# Patient Record
Sex: FEMALE | Race: WHITE | Employment: OTHER | URBAN - METROPOLITAN AREA
[De-identification: names, ages, dates, MRNs, and addresses within clinical notes are randomized per-mention and may not be internally consistent; named-entity substitution may affect disease eponyms.]

---

## 2017-12-04 ENCOUNTER — HOSPITAL ENCOUNTER (OUTPATIENT)
Dept: LAB | Facility: CLINIC | Age: 82
Discharge: HOME | End: 2017-12-04
Attending: ORTHOPAEDIC SURGERY | Admitting: ORTHOPAEDIC SURGERY
Payer: MEDICARE

## 2017-12-04 DIAGNOSIS — Z01.812 PRE-OPERATIVE LABORATORY EXAMINATION: ICD-10-CM

## 2017-12-04 LAB
MRSA DNA SPEC QL NAA+PROBE: NEGATIVE
SPECIMEN SOURCE: NORMAL

## 2017-12-04 PROCEDURE — 87641 MR-STAPH DNA AMP PROBE: CPT | Performed by: ORTHOPAEDIC SURGERY

## 2017-12-04 PROCEDURE — 87640 STAPH A DNA AMP PROBE: CPT | Performed by: ORTHOPAEDIC SURGERY

## 2017-12-04 PROCEDURE — 40000829 ZZHCL STATISTIC STAPH AUREUS SUSCEPT SCREEN PCR: Performed by: ORTHOPAEDIC SURGERY

## 2017-12-04 PROCEDURE — 40000830 ZZHCL STATISTIC STAPH AUREUS METH RESIST SCREEN PCR: Performed by: ORTHOPAEDIC SURGERY

## 2017-12-07 NOTE — PROGRESS NOTES
"D: Patient identified through [facility] Score of \"No help @ Home\" and \"Predict SNF-Predict Home assuming Help@ Home.\" Call placed to Patient.  I: Patient states she has fallen several times and had to go to a Care Center. She has no one to stay with her if she were to go home after surgery-family works or is out of state.  A/P: Pleasant lady that has been to a Care Facility in [place] before and prefers to go there again after discharging from the Hospital.  "

## 2017-12-11 ENCOUNTER — HOSPITAL ENCOUNTER (INPATIENT)
Facility: CLINIC | Age: 82
LOS: 3 days | Discharge: NURSING, REHAB OR LONG-TERM CARE | DRG: 470 | End: 2017-12-14
Attending: ORTHOPAEDIC SURGERY | Admitting: ORTHOPAEDIC SURGERY
Payer: MEDICARE

## 2017-12-11 ENCOUNTER — APPOINTMENT (OUTPATIENT)
Dept: PHYSICAL THERAPY | Facility: CLINIC | Age: 82
DRG: 470 | End: 2017-12-11
Attending: ORTHOPAEDIC SURGERY
Payer: MEDICARE

## 2017-12-11 ENCOUNTER — ANESTHESIA (OUTPATIENT)
Dept: SURGERY | Facility: CLINIC | Age: 82
DRG: 470 | End: 2017-12-11
Payer: MEDICARE

## 2017-12-11 ENCOUNTER — APPOINTMENT (OUTPATIENT)
Dept: GENERAL RADIOLOGY | Facility: CLINIC | Age: 82
DRG: 470 | End: 2017-12-11
Attending: ORTHOPAEDIC SURGERY
Payer: MEDICARE

## 2017-12-11 ENCOUNTER — ANESTHESIA EVENT (OUTPATIENT)
Dept: SURGERY | Facility: CLINIC | Age: 82
DRG: 470 | End: 2017-12-11
Payer: MEDICARE

## 2017-12-11 DIAGNOSIS — Z96.642 STATUS POST LEFT HIP REPLACEMENT: Primary | ICD-10-CM

## 2017-12-11 PROBLEM — Z96.649 HIP JOINT REPLACEMENT STATUS: Status: ACTIVE | Noted: 2017-12-11

## 2017-12-11 LAB
ANION GAP SERPL CALCULATED.3IONS-SCNC: 6 MMOL/L (ref 3–14)
BUN SERPL-MCNC: 20 MG/DL (ref 7–30)
CALCIUM SERPL-MCNC: 8.8 MG/DL (ref 8.5–10.1)
CHLORIDE SERPL-SCNC: 106 MMOL/L (ref 94–109)
CO2 SERPL-SCNC: 27 MMOL/L (ref 20–32)
CREAT SERPL-MCNC: 0.93 MG/DL (ref 0.52–1.04)
GFR SERPL CREATININE-BSD FRML MDRD: 57 ML/MIN/1.7M2
GLUCOSE SERPL-MCNC: 92 MG/DL (ref 70–99)
HGB BLD-MCNC: 9.5 G/DL (ref 11.7–15.7)
PLATELET # BLD AUTO: 218 10E9/L (ref 150–450)
POTASSIUM SERPL-SCNC: 3.8 MMOL/L (ref 3.4–5.3)
SODIUM SERPL-SCNC: 139 MMOL/L (ref 133–144)

## 2017-12-11 PROCEDURE — C1713 ANCHOR/SCREW BN/BN,TIS/BN: HCPCS | Performed by: ORTHOPAEDIC SURGERY

## 2017-12-11 PROCEDURE — 25000128 H RX IP 250 OP 636: Performed by: ANESTHESIOLOGY

## 2017-12-11 PROCEDURE — 40000986 XR PELVIS AND HIP PORTABLE LEFT 1 VIEW

## 2017-12-11 PROCEDURE — 25000128 H RX IP 250 OP 636: Performed by: ORTHOPAEDIC SURGERY

## 2017-12-11 PROCEDURE — 86900 BLOOD TYPING SEROLOGIC ABO: CPT | Performed by: ANESTHESIOLOGY

## 2017-12-11 PROCEDURE — 86901 BLOOD TYPING SEROLOGIC RH(D): CPT | Performed by: ANESTHESIOLOGY

## 2017-12-11 PROCEDURE — 40000170 ZZH STATISTIC PRE-PROCEDURE ASSESSMENT II: Performed by: ORTHOPAEDIC SURGERY

## 2017-12-11 PROCEDURE — 71000012 ZZH RECOVERY PHASE 1 LEVEL 1 FIRST HR: Performed by: ORTHOPAEDIC SURGERY

## 2017-12-11 PROCEDURE — 86923 COMPATIBILITY TEST ELECTRIC: CPT | Performed by: ANESTHESIOLOGY

## 2017-12-11 PROCEDURE — 97161 PT EVAL LOW COMPLEX 20 MIN: CPT | Mod: GP

## 2017-12-11 PROCEDURE — 86850 RBC ANTIBODY SCREEN: CPT | Performed by: ANESTHESIOLOGY

## 2017-12-11 PROCEDURE — 36415 COLL VENOUS BLD VENIPUNCTURE: CPT | Performed by: ANESTHESIOLOGY

## 2017-12-11 PROCEDURE — 97110 THERAPEUTIC EXERCISES: CPT | Mod: GP

## 2017-12-11 PROCEDURE — 25000128 H RX IP 250 OP 636: Performed by: NURSE ANESTHETIST, CERTIFIED REGISTERED

## 2017-12-11 PROCEDURE — 12000007 ZZH R&B INTERMEDIATE

## 2017-12-11 PROCEDURE — 25000132 ZZH RX MED GY IP 250 OP 250 PS 637: Mod: GY | Performed by: ORTHOPAEDIC SURGERY

## 2017-12-11 PROCEDURE — 37000008 ZZH ANESTHESIA TECHNICAL FEE, 1ST 30 MIN: Performed by: ORTHOPAEDIC SURGERY

## 2017-12-11 PROCEDURE — C1776 JOINT DEVICE (IMPLANTABLE): HCPCS | Performed by: ORTHOPAEDIC SURGERY

## 2017-12-11 PROCEDURE — 40000193 ZZH STATISTIC PT WARD VISIT

## 2017-12-11 PROCEDURE — 25000566 ZZH SEVOFLURANE, EA 15 MIN: Performed by: ORTHOPAEDIC SURGERY

## 2017-12-11 PROCEDURE — 25000125 ZZHC RX 250: Performed by: ORTHOPAEDIC SURGERY

## 2017-12-11 PROCEDURE — 0SRB0JA REPLACEMENT OF LEFT HIP JOINT WITH SYNTHETIC SUBSTITUTE, UNCEMENTED, OPEN APPROACH: ICD-10-PCS | Performed by: ORTHOPAEDIC SURGERY

## 2017-12-11 PROCEDURE — 27210995 ZZH RX 272: Performed by: ORTHOPAEDIC SURGERY

## 2017-12-11 PROCEDURE — 27210794 ZZH OR GENERAL SUPPLY STERILE: Performed by: ORTHOPAEDIC SURGERY

## 2017-12-11 PROCEDURE — A9270 NON-COVERED ITEM OR SERVICE: HCPCS | Mod: GY | Performed by: ORTHOPAEDIC SURGERY

## 2017-12-11 PROCEDURE — 25800025 ZZH RX 258: Performed by: ORTHOPAEDIC SURGERY

## 2017-12-11 PROCEDURE — P9041 ALBUMIN (HUMAN),5%, 50ML: HCPCS | Performed by: NURSE ANESTHETIST, CERTIFIED REGISTERED

## 2017-12-11 PROCEDURE — 71000013 ZZH RECOVERY PHASE 1 LEVEL 1 EA ADDTL HR: Performed by: ORTHOPAEDIC SURGERY

## 2017-12-11 PROCEDURE — 36415 COLL VENOUS BLD VENIPUNCTURE: CPT | Performed by: ORTHOPAEDIC SURGERY

## 2017-12-11 PROCEDURE — 97530 THERAPEUTIC ACTIVITIES: CPT | Mod: GP

## 2017-12-11 PROCEDURE — 36000093 ZZH SURGERY LEVEL 4 1ST 30 MIN: Performed by: ORTHOPAEDIC SURGERY

## 2017-12-11 PROCEDURE — 80048 BASIC METABOLIC PNL TOTAL CA: CPT | Performed by: ANESTHESIOLOGY

## 2017-12-11 PROCEDURE — 36000063 ZZH SURGERY LEVEL 4 EA 15 ADDTL MIN: Performed by: ORTHOPAEDIC SURGERY

## 2017-12-11 PROCEDURE — 25000125 ZZHC RX 250: Performed by: NURSE ANESTHETIST, CERTIFIED REGISTERED

## 2017-12-11 PROCEDURE — 85018 HEMOGLOBIN: CPT | Performed by: ORTHOPAEDIC SURGERY

## 2017-12-11 PROCEDURE — 37000009 ZZH ANESTHESIA TECHNICAL FEE, EACH ADDTL 15 MIN: Performed by: ORTHOPAEDIC SURGERY

## 2017-12-11 PROCEDURE — 0QP704Z REMOVAL OF INTERNAL FIXATION DEVICE FROM LEFT UPPER FEMUR, OPEN APPROACH: ICD-10-PCS | Performed by: ORTHOPAEDIC SURGERY

## 2017-12-11 PROCEDURE — 85049 AUTOMATED PLATELET COUNT: CPT | Performed by: ORTHOPAEDIC SURGERY

## 2017-12-11 DEVICE — IMP HEAD FEMORAL BIOM MOD 36MM -6MM 11-363660: Type: IMPLANTABLE DEVICE | Site: HIP | Status: FUNCTIONAL

## 2017-12-11 DEVICE — IMPLANTABLE DEVICE: Type: IMPLANTABLE DEVICE | Site: HIP | Status: FUNCTIONAL

## 2017-12-11 DEVICE — IMP SCR BIOM 6.5X30MM LOW PROF TI 103533: Type: IMPLANTABLE DEVICE | Site: HIP | Status: FUNCTIONAL

## 2017-12-11 RX ORDER — ATORVASTATIN CALCIUM 40 MG/1
40 TABLET, FILM COATED ORAL
Status: DISCONTINUED | OUTPATIENT
Start: 2017-12-11 | End: 2017-12-14 | Stop reason: HOSPADM

## 2017-12-11 RX ORDER — GLYCOPYRROLATE 0.2 MG/ML
INJECTION, SOLUTION INTRAMUSCULAR; INTRAVENOUS PRN
Status: DISCONTINUED | OUTPATIENT
Start: 2017-12-11 | End: 2017-12-11

## 2017-12-11 RX ORDER — ALENDRONATE SODIUM 70 MG/1
70 TABLET ORAL
Status: DISCONTINUED | OUTPATIENT
Start: 2017-12-11 | End: 2017-12-11

## 2017-12-11 RX ORDER — EPHEDRINE SULFATE 50 MG/ML
INJECTION, SOLUTION INTRAMUSCULAR; INTRAVENOUS; SUBCUTANEOUS PRN
Status: DISCONTINUED | OUTPATIENT
Start: 2017-12-11 | End: 2017-12-11

## 2017-12-11 RX ORDER — OXYCODONE HYDROCHLORIDE 5 MG/1
5 TABLET ORAL
Status: DISCONTINUED | OUTPATIENT
Start: 2017-12-11 | End: 2017-12-12

## 2017-12-11 RX ORDER — AMOXICILLIN 250 MG
1-2 CAPSULE ORAL 2 TIMES DAILY
Status: DISCONTINUED | OUTPATIENT
Start: 2017-12-11 | End: 2017-12-14 | Stop reason: HOSPADM

## 2017-12-11 RX ORDER — DIPHENHYDRAMINE HCL 12.5MG/5ML
12.5 LIQUID (ML) ORAL EVERY 6 HOURS PRN
Status: DISCONTINUED | OUTPATIENT
Start: 2017-12-11 | End: 2017-12-14 | Stop reason: HOSPADM

## 2017-12-11 RX ORDER — ASPIRIN 81 MG/1
81 TABLET ORAL EVERY MORNING
Status: DISCONTINUED | OUTPATIENT
Start: 2017-12-12 | End: 2017-12-14 | Stop reason: HOSPADM

## 2017-12-11 RX ORDER — LIDOCAINE 40 MG/G
CREAM TOPICAL
Status: DISCONTINUED | OUTPATIENT
Start: 2017-12-11 | End: 2017-12-14 | Stop reason: HOSPADM

## 2017-12-11 RX ORDER — NALOXONE HYDROCHLORIDE 0.4 MG/ML
.1-.4 INJECTION, SOLUTION INTRAMUSCULAR; INTRAVENOUS; SUBCUTANEOUS
Status: ACTIVE | OUTPATIENT
Start: 2017-12-11 | End: 2017-12-12

## 2017-12-11 RX ORDER — FENTANYL CITRATE 50 UG/ML
INJECTION, SOLUTION INTRAMUSCULAR; INTRAVENOUS PRN
Status: DISCONTINUED | OUTPATIENT
Start: 2017-12-11 | End: 2017-12-11

## 2017-12-11 RX ORDER — HYDROMORPHONE HYDROCHLORIDE 1 MG/ML
.3-.5 INJECTION, SOLUTION INTRAMUSCULAR; INTRAVENOUS; SUBCUTANEOUS EVERY 5 MIN PRN
Status: DISCONTINUED | OUTPATIENT
Start: 2017-12-11 | End: 2017-12-11 | Stop reason: HOSPADM

## 2017-12-11 RX ORDER — ONDANSETRON 2 MG/ML
INJECTION INTRAMUSCULAR; INTRAVENOUS PRN
Status: DISCONTINUED | OUTPATIENT
Start: 2017-12-11 | End: 2017-12-11

## 2017-12-11 RX ORDER — FENTANYL CITRATE 50 UG/ML
25-50 INJECTION, SOLUTION INTRAMUSCULAR; INTRAVENOUS EVERY 5 MIN PRN
Status: DISCONTINUED | OUTPATIENT
Start: 2017-12-11 | End: 2017-12-11 | Stop reason: HOSPADM

## 2017-12-11 RX ORDER — FENTANYL CITRATE 50 UG/ML
25-50 INJECTION, SOLUTION INTRAMUSCULAR; INTRAVENOUS
Status: DISCONTINUED | OUTPATIENT
Start: 2017-12-11 | End: 2017-12-11 | Stop reason: HOSPADM

## 2017-12-11 RX ORDER — AMOXICILLIN 250 MG
1-2 CAPSULE ORAL DAILY PRN
Status: ON HOLD | COMMUNITY
End: 2017-12-13

## 2017-12-11 RX ORDER — ONDANSETRON 4 MG/1
4 TABLET, ORALLY DISINTEGRATING ORAL EVERY 30 MIN PRN
Status: DISCONTINUED | OUTPATIENT
Start: 2017-12-11 | End: 2017-12-11 | Stop reason: HOSPADM

## 2017-12-11 RX ORDER — BUPROPION HYDROCHLORIDE 150 MG/1
150 TABLET, EXTENDED RELEASE ORAL EVERY MORNING
Status: DISCONTINUED | OUTPATIENT
Start: 2017-12-12 | End: 2017-12-14 | Stop reason: HOSPADM

## 2017-12-11 RX ORDER — ACETAMINOPHEN 500 MG
1000 TABLET ORAL ONCE
Status: COMPLETED | OUTPATIENT
Start: 2017-12-11 | End: 2017-12-11

## 2017-12-11 RX ORDER — AMLODIPINE BESYLATE 5 MG/1
5 TABLET ORAL EVERY MORNING
Status: DISCONTINUED | OUTPATIENT
Start: 2017-12-12 | End: 2017-12-14 | Stop reason: HOSPADM

## 2017-12-11 RX ORDER — DEXTROSE MONOHYDRATE, SODIUM CHLORIDE, AND POTASSIUM CHLORIDE 50; 1.49; 4.5 G/1000ML; G/1000ML; G/1000ML
INJECTION, SOLUTION INTRAVENOUS CONTINUOUS
Status: DISCONTINUED | OUTPATIENT
Start: 2017-12-11 | End: 2017-12-14 | Stop reason: HOSPADM

## 2017-12-11 RX ORDER — CITALOPRAM HYDROBROMIDE 20 MG/1
40 TABLET ORAL EVERY MORNING
Status: DISCONTINUED | OUTPATIENT
Start: 2017-12-12 | End: 2017-12-14 | Stop reason: HOSPADM

## 2017-12-11 RX ORDER — LIDOCAINE HYDROCHLORIDE 20 MG/ML
INJECTION, SOLUTION INFILTRATION; PERINEURAL PRN
Status: DISCONTINUED | OUTPATIENT
Start: 2017-12-11 | End: 2017-12-11

## 2017-12-11 RX ORDER — ALBUMIN, HUMAN INJ 5% 5 %
SOLUTION INTRAVENOUS CONTINUOUS PRN
Status: DISCONTINUED | OUTPATIENT
Start: 2017-12-11 | End: 2017-12-11

## 2017-12-11 RX ORDER — NALOXONE HYDROCHLORIDE 0.4 MG/ML
.1-.4 INJECTION, SOLUTION INTRAMUSCULAR; INTRAVENOUS; SUBCUTANEOUS
Status: DISCONTINUED | OUTPATIENT
Start: 2017-12-11 | End: 2017-12-14 | Stop reason: HOSPADM

## 2017-12-11 RX ORDER — DIPHENHYDRAMINE HYDROCHLORIDE 50 MG/ML
12.5 INJECTION INTRAMUSCULAR; INTRAVENOUS EVERY 6 HOURS PRN
Status: DISCONTINUED | OUTPATIENT
Start: 2017-12-11 | End: 2017-12-14 | Stop reason: HOSPADM

## 2017-12-11 RX ORDER — SODIUM CHLORIDE, SODIUM LACTATE, POTASSIUM CHLORIDE, CALCIUM CHLORIDE 600; 310; 30; 20 MG/100ML; MG/100ML; MG/100ML; MG/100ML
INJECTION, SOLUTION INTRAVENOUS CONTINUOUS
Status: DISCONTINUED | OUTPATIENT
Start: 2017-12-11 | End: 2017-12-11 | Stop reason: HOSPADM

## 2017-12-11 RX ORDER — HYDROMORPHONE HYDROCHLORIDE 1 MG/ML
0.2 INJECTION, SOLUTION INTRAMUSCULAR; INTRAVENOUS; SUBCUTANEOUS
Status: DISCONTINUED | OUTPATIENT
Start: 2017-12-11 | End: 2017-12-12

## 2017-12-11 RX ORDER — NEOSTIGMINE METHYLSULFATE 1 MG/ML
VIAL (ML) INJECTION PRN
Status: DISCONTINUED | OUTPATIENT
Start: 2017-12-11 | End: 2017-12-11

## 2017-12-11 RX ORDER — DEXAMETHASONE SODIUM PHOSPHATE 4 MG/ML
INJECTION, SOLUTION INTRA-ARTICULAR; INTRALESIONAL; INTRAMUSCULAR; INTRAVENOUS; SOFT TISSUE PRN
Status: DISCONTINUED | OUTPATIENT
Start: 2017-12-11 | End: 2017-12-11

## 2017-12-11 RX ORDER — ONDANSETRON 2 MG/ML
4 INJECTION INTRAMUSCULAR; INTRAVENOUS EVERY 6 HOURS PRN
Status: DISCONTINUED | OUTPATIENT
Start: 2017-12-11 | End: 2017-12-14 | Stop reason: HOSPADM

## 2017-12-11 RX ORDER — ALBUTEROL SULFATE 0.83 MG/ML
2.5 SOLUTION RESPIRATORY (INHALATION) EVERY 4 HOURS PRN
Status: DISCONTINUED | OUTPATIENT
Start: 2017-12-11 | End: 2017-12-11 | Stop reason: HOSPADM

## 2017-12-11 RX ORDER — ETOMIDATE 2 MG/ML
INJECTION INTRAVENOUS PRN
Status: DISCONTINUED | OUTPATIENT
Start: 2017-12-11 | End: 2017-12-11

## 2017-12-11 RX ORDER — ONDANSETRON 4 MG/1
4 TABLET, ORALLY DISINTEGRATING ORAL EVERY 6 HOURS PRN
Status: DISCONTINUED | OUTPATIENT
Start: 2017-12-11 | End: 2017-12-14 | Stop reason: HOSPADM

## 2017-12-11 RX ORDER — OXYBUTYNIN CHLORIDE 5 MG/1
5 TABLET ORAL 2 TIMES DAILY
Status: DISCONTINUED | OUTPATIENT
Start: 2017-12-11 | End: 2017-12-14 | Stop reason: HOSPADM

## 2017-12-11 RX ORDER — ACETAMINOPHEN 325 MG/1
975 TABLET ORAL EVERY 8 HOURS
Status: COMPLETED | OUTPATIENT
Start: 2017-12-11 | End: 2017-12-14

## 2017-12-11 RX ORDER — ACETAMINOPHEN 325 MG/1
650 TABLET ORAL EVERY 4 HOURS PRN
Status: DISCONTINUED | OUTPATIENT
Start: 2017-12-14 | End: 2017-12-14 | Stop reason: HOSPADM

## 2017-12-11 RX ORDER — CEFAZOLIN SODIUM 2 G/100ML
2 INJECTION, SOLUTION INTRAVENOUS
Status: COMPLETED | OUTPATIENT
Start: 2017-12-11 | End: 2017-12-11

## 2017-12-11 RX ORDER — PROCHLORPERAZINE MALEATE 5 MG
5 TABLET ORAL EVERY 6 HOURS PRN
Status: DISCONTINUED | OUTPATIENT
Start: 2017-12-11 | End: 2017-12-14 | Stop reason: HOSPADM

## 2017-12-11 RX ORDER — HYDRALAZINE HYDROCHLORIDE 20 MG/ML
2.5-5 INJECTION INTRAMUSCULAR; INTRAVENOUS EVERY 10 MIN PRN
Status: DISCONTINUED | OUTPATIENT
Start: 2017-12-11 | End: 2017-12-11 | Stop reason: HOSPADM

## 2017-12-11 RX ORDER — ONDANSETRON 2 MG/ML
4 INJECTION INTRAMUSCULAR; INTRAVENOUS EVERY 30 MIN PRN
Status: DISCONTINUED | OUTPATIENT
Start: 2017-12-11 | End: 2017-12-11 | Stop reason: HOSPADM

## 2017-12-11 RX ADMIN — ROCURONIUM BROMIDE 5 MG: 10 INJECTION INTRAVENOUS at 11:56

## 2017-12-11 RX ADMIN — ROCURONIUM BROMIDE 35 MG: 10 INJECTION INTRAVENOUS at 10:06

## 2017-12-11 RX ADMIN — SENNOSIDES AND DOCUSATE SODIUM 1 TABLET: 8.6; 5 TABLET ORAL at 20:20

## 2017-12-11 RX ADMIN — OXYCODONE HYDROCHLORIDE 5 MG: 5 TABLET ORAL at 20:20

## 2017-12-11 RX ADMIN — ETOMIDATE 10 MG: 2 INJECTION, SOLUTION INTRAVENOUS at 10:05

## 2017-12-11 RX ADMIN — ALBUMIN (HUMAN): 12.5 SOLUTION INTRAVENOUS at 11:38

## 2017-12-11 RX ADMIN — FENTANYL CITRATE 50 MCG: 50 INJECTION INTRAMUSCULAR; INTRAVENOUS at 13:58

## 2017-12-11 RX ADMIN — DEXMEDETOMIDINE HYDROCHLORIDE 4 MCG: 100 INJECTION, SOLUTION INTRAVENOUS at 12:40

## 2017-12-11 RX ADMIN — ONDANSETRON 4 MG: 2 INJECTION INTRAMUSCULAR; INTRAVENOUS at 12:07

## 2017-12-11 RX ADMIN — CEFAZOLIN SODIUM 1 G: 1 SOLUTION INTRAVENOUS at 20:17

## 2017-12-11 RX ADMIN — Medication 5 MG: at 11:38

## 2017-12-11 RX ADMIN — Medication 5 MG: at 11:24

## 2017-12-11 RX ADMIN — POTASSIUM CHLORIDE, DEXTROSE MONOHYDRATE AND SODIUM CHLORIDE: 150; 5; 450 INJECTION, SOLUTION INTRAVENOUS at 14:48

## 2017-12-11 RX ADMIN — NEOSTIGMINE METHYLSULFATE 3 MG: 1 INJECTION INTRAMUSCULAR; INTRAVENOUS; SUBCUTANEOUS at 12:18

## 2017-12-11 RX ADMIN — CEFAZOLIN SODIUM 2 G: 2 INJECTION, SOLUTION INTRAVENOUS at 10:12

## 2017-12-11 RX ADMIN — POTASSIUM CHLORIDE, DEXTROSE MONOHYDRATE AND SODIUM CHLORIDE: 150; 5; 450 INJECTION, SOLUTION INTRAVENOUS at 22:58

## 2017-12-11 RX ADMIN — PHENYLEPHRINE HYDROCHLORIDE 50 MCG: 10 INJECTION INTRAVENOUS at 12:26

## 2017-12-11 RX ADMIN — Medication 0.2 MG: at 16:40

## 2017-12-11 RX ADMIN — GLYCOPYRROLATE 0.4 MG: 0.2 INJECTION, SOLUTION INTRAMUSCULAR; INTRAVENOUS at 12:18

## 2017-12-11 RX ADMIN — Medication 0.5 MG: at 13:55

## 2017-12-11 RX ADMIN — ACETAMINOPHEN 975 MG: 325 TABLET, FILM COATED ORAL at 17:46

## 2017-12-11 RX ADMIN — FENTANYL CITRATE 50 MCG: 50 INJECTION, SOLUTION INTRAMUSCULAR; INTRAVENOUS at 10:07

## 2017-12-11 RX ADMIN — Medication 5 MG: at 11:36

## 2017-12-11 RX ADMIN — DEXMEDETOMIDINE HYDROCHLORIDE 8 MCG: 100 INJECTION, SOLUTION INTRAVENOUS at 10:04

## 2017-12-11 RX ADMIN — CEFAZOLIN SODIUM 1 G: 2 INJECTION, SOLUTION INTRAVENOUS at 12:13

## 2017-12-11 RX ADMIN — FENTANYL CITRATE 50 MCG: 50 INJECTION, SOLUTION INTRAMUSCULAR; INTRAVENOUS at 10:02

## 2017-12-11 RX ADMIN — FENTANYL CITRATE 25 MCG: 50 INJECTION, SOLUTION INTRAMUSCULAR; INTRAVENOUS at 11:45

## 2017-12-11 RX ADMIN — PHENYLEPHRINE HYDROCHLORIDE 50 MCG: 10 INJECTION INTRAVENOUS at 12:08

## 2017-12-11 RX ADMIN — FENTANYL CITRATE 50 MCG: 50 INJECTION, SOLUTION INTRAMUSCULAR; INTRAVENOUS at 10:44

## 2017-12-11 RX ADMIN — TRANEXAMIC ACID 1 G: 100 INJECTION, SOLUTION INTRAVENOUS at 12:14

## 2017-12-11 RX ADMIN — ATORVASTATIN CALCIUM 40 MG: 40 TABLET, FILM COATED ORAL at 17:49

## 2017-12-11 RX ADMIN — SODIUM CHLORIDE, POTASSIUM CHLORIDE, SODIUM LACTATE AND CALCIUM CHLORIDE: 600; 310; 30; 20 INJECTION, SOLUTION INTRAVENOUS at 09:39

## 2017-12-11 RX ADMIN — HYDROMORPHONE HYDROCHLORIDE 0.25 MG: 1 INJECTION, SOLUTION INTRAMUSCULAR; INTRAVENOUS; SUBCUTANEOUS at 12:28

## 2017-12-11 RX ADMIN — ACETAMINOPHEN 1000 MG: 500 TABLET, FILM COATED ORAL at 09:39

## 2017-12-11 RX ADMIN — TRANEXAMIC ACID 1 G: 100 INJECTION, SOLUTION INTRAVENOUS at 10:12

## 2017-12-11 RX ADMIN — SODIUM CHLORIDE, POTASSIUM CHLORIDE, SODIUM LACTATE AND CALCIUM CHLORIDE: 600; 310; 30; 20 INJECTION, SOLUTION INTRAVENOUS at 12:09

## 2017-12-11 RX ADMIN — Medication 10 MG: at 10:29

## 2017-12-11 RX ADMIN — HYDROMORPHONE HYDROCHLORIDE 0.25 MG: 1 INJECTION, SOLUTION INTRAMUSCULAR; INTRAVENOUS; SUBCUTANEOUS at 12:14

## 2017-12-11 RX ADMIN — OXYBUTYNIN CHLORIDE 5 MG: 5 TABLET ORAL at 20:20

## 2017-12-11 RX ADMIN — DEXAMETHASONE SODIUM PHOSPHATE 4 MG: 4 INJECTION, SOLUTION INTRA-ARTICULAR; INTRALESIONAL; INTRAMUSCULAR; INTRAVENOUS; SOFT TISSUE at 10:59

## 2017-12-11 RX ADMIN — GLYCOPYRROLATE 0.2 MG: 0.2 INJECTION, SOLUTION INTRAMUSCULAR; INTRAVENOUS at 10:26

## 2017-12-11 RX ADMIN — LIDOCAINE HYDROCHLORIDE 60 MG: 20 INJECTION, SOLUTION INFILTRATION; PERINEURAL at 10:05

## 2017-12-11 RX ADMIN — DEXMEDETOMIDINE HYDROCHLORIDE 8 MCG: 100 INJECTION, SOLUTION INTRAVENOUS at 10:02

## 2017-12-11 RX ADMIN — FENTANYL CITRATE 25 MCG: 50 INJECTION, SOLUTION INTRAMUSCULAR; INTRAVENOUS at 11:10

## 2017-12-11 RX ADMIN — PHENYLEPHRINE HYDROCHLORIDE 100 MCG: 10 INJECTION INTRAVENOUS at 11:41

## 2017-12-11 ASSESSMENT — LIFESTYLE VARIABLES: TOBACCO_USE: 0

## 2017-12-11 ASSESSMENT — ENCOUNTER SYMPTOMS
SEIZURES: 0
DYSRHYTHMIAS: 0

## 2017-12-11 ASSESSMENT — COPD QUESTIONNAIRES: COPD: 0

## 2017-12-11 NOTE — ANESTHESIA PREPROCEDURE EVALUATION
"Procedure: Procedure(s):  REMOVE HARDWARE HIP NAILING  ARTHROPLASTY HIP  Preop diagnosis: LEFT HIP DJD     Allergies   Allergen Reactions     Propofol      \"I got stiff as a board\"     Past Medical History:   Diagnosis Date     Arthritis      Compression fracture of spine (H)      Depression      Dysphagia      Gastro-oesophageal reflux disease      Hiatal hernia      Hyperlipidemia      Hypertension      Osteoporosis      Past Surgical History:   Procedure Laterality Date     ARTHROPLASTY HIP  11/10/2011    Procedure:ARTHROPLASTY HIP; RIGHT TOTAL HIP ARTHROPLASTY (BIOMET)^; Surgeon:CHIRAG IBRAHIM; Location: OR     GI SURGERY      EGD     GYN SURGERY      hyst     OPEN REDUCTION INTERNAL FIXATION HIP NAILING Left 11/21/2016    Procedure: OPEN REDUCTION INTERNAL FIXATION HIP NAILING;  Surgeon: Maldonado Koo MD;  Location:  OR     ORTHOPEDIC SURGERY      TKA     Prior to Admission medications    Medication Sig Start Date End Date Taking? Authorizing Provider   Acetaminophen (TYLENOL PO) Take 500-1,000 mg by mouth every 8 hours as needed for mild pain or fever   Yes Reported, Patient   ASPIRIN EC PO Take 81 mg by mouth every morning   Yes Reported, Patient   senna-docusate (SENOKOT-S;PERICOLACE) 8.6-50 MG per tablet Take 1-2 tablets by mouth daily as needed for constipation   Yes Reported, Patient   VITAMIN D, CHOLECALCIFEROL, PO Take 2,000 Units by mouth every morning   Yes Reported, Patient   mupirocin (BACTROBAN) 2 % nasal ointment Apply 1 each into each nare 2 times daily 12/5/17 12/11/17 Yes Reported, Patient   BuPROPion HCl (WELLBUTRIN SR PO) Take 150 mg by mouth every morning    Yes Unknown, Entered By History   AMLODIPINE BESYLATE PO Take 5 mg by mouth every morning    Yes Unknown, Entered By History   ATORVASTATIN CALCIUM PO Take 40 mg by mouth daily (with dinner)    Yes Unknown, Entered By History   calcium-vitamin D (CALTRATE) 600-400 MG-UNIT per tablet Take 1 tablet by mouth daily " (with dinner)    Yes Unknown, Entered By History   Multiple Vitamin (MULTI-VITAMIN PO) Take 1 tablet by mouth daily (with dinner)    Yes Reported, Patient   citalopram (CELEXA) 40 MG tablet Take 40 mg by mouth every morning    Yes Reported, Patient   oxybutynin (DITROPAN) 5 MG tablet Take 5 mg by mouth 2 times daily.   Yes Reported, Patient   omeprazole (PRILOSEC) 40 MG capsule Take 40 mg by mouth every morning    Yes Reported, Patient   ALENdronate (FOSAMAX) 70 MG tablet Take 70 mg by mouth every 7 days (Sunday)   Yes Reported, Patient     No current Epic-ordered facility-administered medications on file.      No current [facility]-ordered outpatient prescriptions on file.     Wt Readings from Last 1 Encounters:   11/20/16 54 kg (119 lb)     Temp Readings from Last 1 Encounters:   11/24/16 36.7  C (98  F)     BP Readings from Last 6 Encounters:   11/24/16 121/63   05/31/12 145/80   11/13/11 114/78     Pulse Readings from Last 4 Encounters:   11/23/16 77   11/11/11 67     Resp Readings from Last 1 Encounters:   11/24/16 16     SpO2 Readings from Last 1 Encounters:   11/24/16 95%     RECENT LABS: plt 232, hgb 11.0  ECG: NSR, no st or twave abnormalities.       Anesthesia Evaluation     .             ROS/MED HX    ENT/Pulmonary:      (-) tobacco use, asthma, COPD and sleep apnea   Neurologic:      (-) seizures, CVA and migraines   Cardiovascular:     (+) Dyslipidemia, hypertension----. : . . . :. .      (-) MEYERS, arrhythmias and valvular problems/murmurs   METS/Exercise Tolerance:     Hematologic:        (-) history of blood clots, anemia and other hematologic disorder   Musculoskeletal:   (+) , , other musculoskeletal- osteoporosis      GI/Hepatic:     (+) GERD hiatal hernia, Other GI/Hepatic dysphagia       Renal/Genitourinary:      (-) renal disease and nephrolithiasis   Endo:      (-) Type I DM, Type II DM, thyroid disease and obesity   Psychiatric:     (+) psychiatric history depression      Infectious Disease:         (-) Recent Fever   Malignancy:         Other:                     Physical Exam  Normal systems: cardiovascular and pulmonary    Airway   Mallampati: II  TM distance: >3 FB  Neck ROM: full    Dental   (+) lower dentures and upper dentures    Cardiovascular   Rhythm and rate: regular and normal  (-) no murmur    Pulmonary                     Anesthesia Plan      History & Physical Review      ASA Status:  3 .    NPO Status:  > 8 hours    Plan for General and ETT with Etomidate induction. Maintenance will be Balanced.    PONV prophylaxis:  Ondansetron (or other 5HT-3) and Dexamethasone or Solumedrol  Precedex infusion      Postoperative Care  Postoperative pain management:  Multi-modal analgesia.      Consents  Anesthetic plan, risks, benefits and alternatives discussed with:  Patient..                          .

## 2017-12-11 NOTE — ANESTHESIA CARE TRANSFER NOTE
Patient: Parvin Wen    Procedure(s):  LEFT REMOVAL OF HIP HARDWARE  AND LEFT TOTAL HIP ARTHROPLASTY  - Wound Class: I-Clean   - Wound Class: I-Clean    Diagnosis: LEFT HIP DJD   Diagnosis Additional Information: No value filed.    Anesthesia Type:   General, ETT     Note:  Airway :Face Mask  Patient transferred to:PACU  Comments: Transferred to PACU, spontaneous respirations, 10L oxygen via facemask.  All monitors and alarms on and functioning, VSS.  Patient awake, comfortable.  Report to PACU RN.Handoff Report: Identifed the Patient, Identified the Reponsible Provider, Reviewed the pertinent medical history, Discussed the surgical course, Reviewed Intra-OP anesthesia mangement and issues during anesthesia, Set expectations for post-procedure period and Allowed opportunity for questions and acknowledgement of understanding      Vitals: (Last set prior to Anesthesia Care Transfer)    CRNA VITALS  12/11/2017 1212 - 12/11/2017 1251      12/11/2017             Resp Rate (set): 10                Electronically Signed By: RONALD Long CRNA  December 11, 2017  12:51 PM

## 2017-12-11 NOTE — PLAN OF CARE
Problem: Patient Care Overview  Goal: Plan of Care/Patient Progress Review  PT: Orders received, evaluation completed and treatment initiated. 87 year old female s/p removal of L hip hardware (from previous L hip fx repair) and L MONA. Patient reports mod I for mobility using a 4WW. Pt lives in a [facility] with 2 stairs (2 grabbars) to enter. Lives alone and manages her own medications. Does have cleaning lady assist 1x/week.     Discharge Planner PT   Patient plan for discharge: TCU  Current status: Trialed supine to sit with min A x 2. Limited by pain (8/10). Returned to supine with mod A x 2. Dependent to boost to HOB. Tolerates L MONA exercises with assist.   Barriers to return to prior living situation: Stairs, level of A  Recommendations for discharge: TBD POD 2  Rationale for recommendations: TBD       Entered by: Danita Feng 12/11/2017 4:59 PM

## 2017-12-11 NOTE — IP AVS SNAPSHOT
MRN:[id number]                      After Visit Summary   12/11/2017    Parvin Wen    MRN: [id number]           Thank you!     Thank you for choosing [facility] for your care. Our goal is always to provide you with excellent care. Hearing back from our patients is one way we can continue to improve our services. Please take a few minutes to complete the written survey that you may receive in the mail after you visit with us. Thank you!        Patient Information     Date Of Birth          9/26/1930        Designated Caregiver       Most Recent Value    Caregiver    Will someone help with your care after discharge? no      About your hospital stay     You were admitted on:  December 11, 2017 You last received care in the:  [facility] [address] [facility]    You were discharged on:  December 14, 2017        Reason for your hospital stay       S/p left total hip arthroplasty  Antibiotics  Therapy  Pain control                  Who to Call     For medical emergencies, please call 911.  For non-urgent questions about your medical care, please call your primary care provider or clinic, [phone]  For questions related to your surgery, please call your surgery clinic        Attending Provider     Provider Specialty    Rj Murphy MD Orthopedics       Primary Care Provider Office Phone # Fax #    Jac Roldan [phone]      After Care Instructions     Activity - Up with nursing assistance           Advance Diet as Tolerated       Follow this diet upon discharge: Orders Placed This Encounter      Room Service      Advance Diet as Tolerated: Regular Diet Adult            Bladder scan       X 2 for post void residual as needed            Fall precautions           General info for SNF       Length of Stay Estimate: Short Term Care: Estimated # of Days <30  Condition at Discharge: Improving  Level of care:skilled   Rehabilitation Potential: Good  Admission  "H&P remains valid and up-to-date: Yes  Recent Chemotherapy: N/A  Use Nursing Home Standing Orders: Yes            Mantoux instructions       Give two-step Mantoux (PPD) Per Facility Policy Yes            Weight bearing status       WBAT            Wound care (specify)       Site:   Left hip  Instructions:  Daily dressing changes/as needed. Keep incision clean, dry, intact                  Follow-up Appointments     Follow Up and recommended labs and tests       Follow up with Dr. Murphy or Soraida Chaudhary PA-C in 2 weeks.  No follow up labs or test are needed.                  Additional Services     Occupational Therapy Adult Consult       Evaluate and treat as clinically indicated.    Reason:  S/p left total hip arthroplasty            Physical Therapy Adult Consult       Evaluate and treat as clinically indicated. Gait training and strengthening    Reason:  S/p left total hip arthroplasty                  Future tests that were ordered for you     AntiEmbolism Stockings       Bilateral below knee length.On in the morning, off at night                  Pending Results     No orders found from 12/9/2017 to 12/12/2017.            Statement of Approval     Ordered          12/13/17 0840  I have reviewed and agree with all the recommendations and orders detailed in this document.  EFFECTIVE NOW     Approved and electronically signed by:  Soraida Chaudhary PA-C             Admission Information     Date & Time Provider Department Dept. Phone    12/11/2017 Rj Murphy MD [place] [facility] [address] [facility] [phone]      Your Vitals Were     Blood Pressure Pulse Temperature Respirations Height Weight    112/56 (BP Location: Left arm) 71 98.1  F (36.7  C) (Oral) 18 1.499 m (4' 11\") 54.4 kg (120 lb)    Pulse Oximetry BMI (Body Mass Index)                92% 24.24 kg/m2          MyChart Information     [facility] lets you send messages to your doctor, view your test results, renew your " "prescriptions, schedule appointments and more. To sign up, go to [email or web address] . Click on \"Log in\" on the left side of the screen, which will take you to the Welcome page. Then click on \"Sign up Now\" on the right side of the page.     You will be asked to enter the access code listed below, as well as some personal information. Please follow the directions to create your username and password.     Your access code is: [email or web address]  Expires: 3/14/2018 11:08 AM     Your access code will  in 90 days. If you need help or a new code, please call your [facility] clinic or [phone].        Care EveryWhere ID     This is your Care EveryWhere ID. This could be used by other organizations to access your [facility] medical records  [id number]        Equal Access to Services     ROCCO COX : Lorri Madison, neelima de la cruz, lizeth ren, amaya echols . [facility] [phone].    ATENCIÓN: Si habla español, tiene a you disposición servicios gratuitos de asistencia lingüística. Llliseth al [phone].    We comply with applicable federal civil rights laws and [place] laws. We do not discriminate on the basis of race, color, national origin, age, disability, sex, sexual orientation, or gender identity.               Review of your medicines      UNREVIEWED medicines. Ask your doctor about these medicines        Dose / Directions    mupirocin 2 % nasal ointment   Commonly known as:  BACTROBAN   Ask about: Should I take this medication?        Dose:  1 each   Apply 1 each into each nare 2 times daily   Refills:  0         START taking        Dose / Directions    enoxaparin 40 MG/0.4ML injection   Commonly known as:  LOVENOX        Dose:  40 mg   Inject 0.4 mLs (40 mg) Subcutaneous every 24 hours for 10 days   Refills:  0       ondansetron 4 MG ODT tab   Commonly known as:  ZOFRAN-ODT        Dose:  4 mg   Take 1 tablet (4 mg) by mouth every 6 hours as needed " for nausea or vomiting   Quantity:  120 tablet   Refills:  0       oxyCODONE IR 5 MG tablet   Commonly known as:  ROXICODONE        Dose:  5-10 mg   Take 1-2 tablets (5-10 mg) by mouth every 3 hours as needed for moderate to severe pain   Quantity:  60 tablet   Refills:  0         CONTINUE these medicines which may have CHANGED, or have new prescriptions. If we are uncertain of the size of tablets/capsules you have at home, strength may be listed as something that might have changed.        Dose / Directions    senna-docusate 8.6-50 MG per tablet   Commonly known as:  SENOKOT-S;PERICOLACE   This may have changed:    - when to take this  - reasons to take this        Dose:  1-2 tablet   Take 1-2 tablets by mouth 2 times daily   Quantity:  100 tablet   Refills:  0         CONTINUE these medicines which have NOT CHANGED        Dose / Directions    alendronate 70 MG tablet   Commonly known as:  FOSAMAX        Dose:  70 mg   Take 70 mg by mouth every 7 days (Sunday)   Refills:  0       AMLODIPINE BESYLATE PO        Dose:  5 mg   Take 5 mg by mouth every morning   Refills:  0       ASPIRIN EC PO        Dose:  81 mg   Take 81 mg by mouth every morning   Refills:  0       ATORVASTATIN CALCIUM PO        Dose:  40 mg   Take 40 mg by mouth daily (with dinner)   Refills:  0       calcium-vitamin D 600-400 MG-UNIT per tablet   Commonly known as:  CALTRATE        Dose:  1 tablet   Take 1 tablet by mouth daily (with dinner)   Refills:  0       citalopram 40 MG tablet   Commonly known as:  celeXA        Dose:  40 mg   Take 40 mg by mouth every morning   Refills:  0       MULTI-VITAMIN PO        Dose:  1 tablet   Take 1 tablet by mouth daily (with dinner)   Refills:  0       omeprazole 40 MG capsule   Commonly known as:  priLOSEC        Dose:  40 mg   Take 40 mg by mouth every morning   Refills:  0       oxybutynin 5 MG tablet   Commonly known as:  DITROPAN        Dose:  5 mg   Take 5 mg by mouth 2 times daily.   Refills:  0        TYLENOL PO        Dose:  500-1000 mg   Take 500-1,000 mg by mouth every 8 hours as needed for mild pain or fever   Refills:  0       VITAMIN D (CHOLECALCIFEROL) PO        Dose:  2000 Units   Take 2,000 Units by mouth every morning   Refills:  0       WELLBUTRIN SR PO        Dose:  150 mg   Take 150 mg by mouth every morning   Refills:  0            Where to get your medicines      Some of these will need a paper prescription and others can be bought over the counter. Ask your nurse if you have questions.     You don't need a prescription for these medications     enoxaparin 40 MG/0.4ML injection    ondansetron 4 MG ODT tab    senna-docusate 8.6-50 MG per tablet         Information about where to get these medications is not yet available     ! Ask your nurse or doctor about these medications     oxyCODONE IR 5 MG tablet                Protect others around you: Learn how to safely use, store and throw away your medicines at www.disposemymeds.org.             Medication List: This is a list of all your medications and when to take them. Check marks below indicate your daily home schedule. Keep this list as a reference.      Medications           Morning Afternoon Evening Bedtime As Needed    alendronate 70 MG tablet   Commonly known as:  FOSAMAX   Take 70 mg by mouth every 7 days (Sunday)                                AMLODIPINE BESYLATE PO   Take 5 mg by mouth every morning   Last time this was given:  5 mg on 12/14/2017  8:05 AM                                ASPIRIN EC PO   Take 81 mg by mouth every morning   Last time this was given:  81 mg on 12/14/2017  8:07 AM                                ATORVASTATIN CALCIUM PO   Take 40 mg by mouth daily (with dinner)   Last time this was given:  40 mg on 12/14/2017  4:04 PM                                calcium-vitamin D 600-400 MG-UNIT per tablet   Commonly known as:  CALTRATE   Take 1 tablet by mouth daily (with dinner)   Last time this was given:  1 tablet on  12/14/2017  4:04 PM                                citalopram 40 MG tablet   Commonly known as:  celeXA   Take 40 mg by mouth every morning   Last time this was given:  40 mg on 12/14/2017  8:06 AM                                enoxaparin 40 MG/0.4ML injection   Commonly known as:  LOVENOX   Inject 0.4 mLs (40 mg) Subcutaneous every 24 hours for 10 days   Last time this was given:  40 mg on 12/14/2017 10:25 AM                                MULTI-VITAMIN PO   Take 1 tablet by mouth daily (with dinner)                                omeprazole 40 MG capsule   Commonly known as:  priLOSEC   Take 40 mg by mouth every morning   Last time this was given:  40 mg on 12/14/2017  8:06 AM                                ondansetron 4 MG ODT tab   Commonly known as:  ZOFRAN-ODT   Take 1 tablet (4 mg) by mouth every 6 hours as needed for nausea or vomiting   Last time this was given:  4 mg on 12/13/2017  5:10 AM                                oxybutynin 5 MG tablet   Commonly known as:  DITROPAN   Take 5 mg by mouth 2 times daily.   Last time this was given:  5 mg on 12/14/2017  8:06 AM                                oxyCODONE IR 5 MG tablet   Commonly known as:  ROXICODONE   Take 1-2 tablets (5-10 mg) by mouth every 3 hours as needed for moderate to severe pain   Last time this was given:  5 mg on 12/14/2017  2:54 PM                                senna-docusate 8.6-50 MG per tablet   Commonly known as:  SENOKOT-S;PERICOLACE   Take 1-2 tablets by mouth 2 times daily   Last time this was given:  2 tablets on 12/14/2017  8:06 AM                                TYLENOL PO   Take 500-1,000 mg by mouth every 8 hours as needed for mild pain or fever   Last time this was given:  650 mg on 12/14/2017  4:04 PM                                VITAMIN D (CHOLECALCIFEROL) PO   Take 2,000 Units by mouth every morning   Last time this was given:  2,000 Units on 12/14/2017  8:06 AM                                WELLBUTRIN SR PO   Take 150 mg  by mouth every morning   Last time this was given:  150 mg on 12/14/2017  8:06 AM                                  ASK your doctor about these medications           Morning Afternoon Evening Bedtime As Needed    mupirocin 2 % nasal ointment   Commonly known as:  BACTROBAN   Apply 1 each into each nare 2 times daily   Ask about: Should I take this medication?

## 2017-12-11 NOTE — OR NURSING
Telephone report given to [facility] RN.  Patient will be transported to Room. 19 via cart accompanied by RN and NA.  Belongings: 1 hospital bag and dentures.

## 2017-12-11 NOTE — IP AVS SNAPSHOT
[place] [address] [facility]: [phone]            Medication Administration Report for Parvin Wen as of 12/14/17 1618   Legend:    Given Hold Not Given Due Canceled Entry Other Actions    Time Time (Time) Time  Time-Action       Inactive    Active    Linked        Medications 12/08/17 12/09/17 12/10/17 12/11/17 12/12/17 12/13/17 12/14/17    acetaminophen (TYLENOL) tablet 650 mg  Dose: 650 mg Freq: EVERY 4 HOURS PRN Route: PO  PRN Reason: other  PRN Comment: surgical pain  Start: 12/14/17 0000   Admin Instructions: May give first dose 4 hours after last scheduled dose of acetaminophen.  Maximum acetaminophen dose from all sources = 75 mg/kg/day not to exceed 4 grams/day.           1604 (650 mg)-Given           amLODIPine (NORVASC) tablet 5 mg  Dose: 5 mg Freq: EVERY MORNING Route: PO  Start: 12/12/17 0900        0905 (5 mg)-Given        0801 (5 mg)-Given        0805 (5 mg)-Given           aspirin EC EC tablet 81 mg  Dose: 81 mg Freq: EVERY MORNING Route: PO  Start: 12/12/17 0900        0903 (81 mg)-Given        0802 (81 mg)-Given        0807 (81 mg)-Given           atorvastatin (LIPITOR) tablet 40 mg  Dose: 40 mg Freq: DAILY WITH SUPPER Route: PO  Start: 12/11/17 1700       1749 (40 mg)-Given        1855 (40 mg)-Given        1809 (40 mg)-Given        1604 (40 mg)-Given           benzocaine-menthol (CHLORASEPTIC) 6-10 MG lozenge 1-2 lozenge  Dose: 1-2 lozenge Freq: EVERY 1 HOUR PRN Route: BU  PRN Reason: sore throat  PRN Comment: sore throat without fever  Start: 12/11/17 1428              buPROPion (WELLBUTRIN SR) 12 hr tablet 150 mg  Dose: 150 mg Freq: EVERY MORNING Route: PO  Start: 12/12/17 0900        0905 (150 mg)-Given        0802 (150 mg)-Given        0806 (150 mg)-Given           calcium carbonate (TUMS) chewable tablet 500-1,000 mg  Dose: 500-1,000 mg Freq: EVERY 2 HOURS PRN Route: PO  PRN Reason: heartburn  Start: 12/12/17 0005   Admin Instructions: Do not give if  calcium level greater than 10 mg/dL.         0031 (1,000 mg)-Given       1440 (1,000 mg)-Given       1855 (1,000 mg)-Given             calcium-vitamin D (CALTRATE) 600-400 MG-UNIT per tablet 1 tablet  Dose: 1 tablet Freq: DAILY WITH SUPPER Route: PO  Start: 12/11/17 1700       (1750)-Not Given        1855 (1 tablet)-Given        1810 (1 tablet)-Given        1604 (1 tablet)-Given           cholecalciferol (vitamin D3) tablet 2,000 Units  Dose: 2,000 Units Freq: EVERY MORNING Route: PO  Start: 12/12/17 0900        0904 (2,000 Units)-Given        0802 (2,000 Units)-Given        0806 (2,000 Units)-Given           citalopram (celeXA) tablet 40 mg  Dose: 40 mg Freq: EVERY MORNING Route: PO  Start: 12/12/17 0900        0904 (40 mg)-Given        0801 (40 mg)-Given        0806 (40 mg)-Given           dextrose 5% and 0.45% NaCl + KCl 20 mEq/L infusion  Rate: 125 mL/hr Freq: CONTINUOUS Route: IV  Start: 12/11/17 1430   Admin Instructions: Change to saline lock when PO well tolerated.        1448 ( )-New Bag       2258 ( )-New Bag         0201 ( )-New Bag            diphenhydrAMINE (BENADRYL) injection 50 mg  Dose: 50 mg Freq: ONCE PRN Route: IV  PRN Reason: other  PRN Comment: hives, itching, rash, flushing, swollen lips/tongue, or respiratory distress associated with IV iron hypersensitivity.  Start: 12/13/17 1005   Admin Instructions: Can be given with ranitidine. Discontinue when parenteral iron therapy complete.  For ordered doses up to 50 mg, give IV Push undiluted. Give each 25mg over a minimum of 1 minute. Extend in non-emergency               diphenhydrAMINE (BENADRYL) solution 12.5 mg  Dose: 12.5 mg Freq: EVERY 6 HOURS PRN Route: PO  PRN Reason: itching  Start: 12/11/17 1428   Admin Instructions: Caution to be used when administering multiple CNS depressing meds within a short time frame.              Or  diphenhydrAMINE (BENADRYL) injection 12.5 mg  Dose: 12.5 mg Freq: EVERY 6 HOURS PRN Route: IV  PRN Reason:  itching  PRN Comment: Only give if patient unable to take PO.  Start: 12/11/17 1428   Admin Instructions: Caution to be used when administering multiple CNS depressing meds within a short time frame.  For ordered doses up to 50 mg, give IV Push undiluted. Give each 25mg over a minimum of 1 minute. Extend in non-emergency               enoxaparin (LOVENOX) injection 40 mg  Dose: 40 mg Freq: EVERY 24 HOURS Route: SC  Start: 12/12/17 1000   Admin Instructions: Check to make sure start date/time is 12-24 hours post op unless documented complication, AND no sooner than 22 hours post op if spinal anesthesia used.   Continue until discharge to home. HOLD if platelet count falls below 50% of baseline or less than 100,000/ L and notify provider.         0905 (40 mg)-Given        1021 (40 mg)-Given        1025 (40 mg)-Given           EPINEPHrine (ADRENALIN) kit 0.3 mg  Dose: 0.3 mg Freq: EVERY 3 MIN PRN Route: IM  PRN Comment: hypotension or airway obstruction associated with IV iron hypersensitivity.  Start: 12/13/17 1005   Admin Instructions: Up to a maximum of 2 doses.  Administer in the anterior or lateral thigh.  Discontinue when IV iron therapy complete.  See kit labeling for dosing instructions.  Not for direct undiluted intravenous injection (1mg/mL = 1:1000).   Protect from light.               famotidine (PEPCID) injection 20 mg  Dose: 20 mg Freq: ONCE PRN Route: IV  PRN Comment: hives, itching, rash associated with IV iron hypersensitivity.    Start: 12/13/17 1005   Admin Instructions: Can be given with diphenhydramine. Discontinue when parenteral iron therapy complete.  For ordered doses up to 20 mg, give IV Push diluted with 5-10ml NS over a minimum of 2 minutes.               HYDROmorphone (PF) (DILAUDID) injection 0.3-0.5 mg  Dose: 0.3-0.5 mg Freq: EVERY 2 HOURS PRN Route: IV  PRN Reason: severe pain  PRN Comment: or if patient unable to take PO  Start: 12/12/17 0712   Admin Instructions: Hold while on  "PCA.  For ordered doses up to 4 mg give IV Push undiluted. Administer each 2mg over 2-5 minutes.               lidocaine (LMX4) cream  Freq: EVERY 1 HOUR PRN Route: Top  PRN Reason: pain  PRN Comment: with VAD insertion or accessing implanted port.  Start: 12/11/17 1428   Admin Instructions: Do NOT give if patient has a history of allergy to any local anesthetic or any \"parag\" product.   Apply 30 minutes prior to VAD insertion or port access.  MAX Dose:  2.5 g (  of 5 g tube)               lidocaine 1 % 1 mL  Dose: 1 mL Freq: EVERY 1 HOUR PRN Route: OTHER  PRN Comment: mild pain with VAD insertion or accessing implanted port  Start: 12/11/17 1428   Admin Instructions: Do NOT give if patient has a history of allergy to any local anesthetic or any \"parag\" product. MAX dose 1 mL subcutaneous OR intradermal in divided doses.               methylPREDNISolone sodium succinate (solu-MEDROL) injection 125 mg  Dose: 125 mg Freq: ONCE PRN Route: IV  PRN Comment: respiratory distress associated with hypersensitivity reaction to iron.  Start: 12/13/17 1005   Admin Instructions: Discontinue when parenteral iron therapy complete  Give Doses 125mg and less IV Push over 2-3 minutes - reconstitute with 2 mL of bacteriostatic water if no diluent is provided. Doses greater than 125 mg need to be in at least 50 mL IVPB.               naloxone (NARCAN) injection 0.1-0.4 mg  Dose: 0.1-0.4 mg Freq: EVERY 2 MIN PRN Route: IV  PRN Reason: opioid reversal  Start: 12/11/17 1428   Admin Instructions: For respiratory rate LESS than or EQUAL to 8.  Partial reversal dose:  0.1 mg titrated q 2 minutes for Analgesia Side Effects Monitoring Sedation Level of 3 (frequently drowsy, arousable, drifts to sleep during conversation).Full reversal dose:  0.4 mg bolus for Analgesia Side Effects Monitoring Sedation Level of 4 (somnolent, minimal or no response to stimulation).  For ordered doses up to 2mg give IVP. Give each 0.4mg over 15 seconds in " emergency situations. For non-emergent situations further dilute in 9mL of NS to facilitate titration of response.               omeprazole (priLOSEC) CR capsule 40 mg  Dose: 40 mg Freq: EVERY MORNING Route: PO  Start: 12/12/17 0900        0904 (40 mg)-Given        0801 (40 mg)-Given        0806 (40 mg)-Given           ondansetron (ZOFRAN-ODT) ODT tab 4 mg  Dose: 4 mg Freq: EVERY 6 HOURS PRN Route: PO  PRN Reasons: nausea,vomiting  Start: 12/11/17 1428   Admin Instructions: This is Step 1 of nausea and vomiting management.  If nausea not resolved in 15 minutes, go to Step 2 prochlorperazine (COMPAZINE). Do not push through foil backing. Peel back foil and gently remove. Place on tongue immediately. Administration with liquid unnecessary         2203 (4 mg)-Given        0510 (4 mg)-Given           Or  ondansetron (ZOFRAN) injection 4 mg  Dose: 4 mg Freq: EVERY 6 HOURS PRN Route: IV  PRN Reasons: nausea,vomiting  Start: 12/11/17 1428   Admin Instructions: This is Step 1 of nausea and vomiting management.  If nausea not resolved in 15 minutes, go to Step 2 prochlorperazine (COMPAZINE).  Irritant. For ordered doses up to 4 mg, give IV Push undiluted over 2-5 minutes.                             oxybutynin (DITROPAN) tablet 5 mg  Dose: 5 mg Freq: 2 TIMES DAILY Route: PO  Start: 12/11/17 2100       2020 (5 mg)-Given        0904 (5 mg)-Given       2036 (5 mg)-Given        0802 (5 mg)-Given       2049 (5 mg)-Given        0806 (5 mg)-Given       [ ] 2100           oxyCODONE IR (ROXICODONE) tablet 5-10 mg  Dose: 5-10 mg Freq: EVERY 3 HOURS PRN Route: PO  PRN Reason: moderate to severe pain  Start: 12/12/17 0712   Admin Instructions: IF CrCl is UNKNOWN start at lowest end of dosing range.  Hold while on PCA or with regular IV opioid dosing.         0747 (5 mg)-Given       1056 (10 mg)-Given       1451 (10 mg)-Given        0509 (5 mg)-Given       0802 (5 mg)-Given       1505 (5 mg)-Given       1809 (5 mg)-Given        0807 (5  mg)-Given       1454 (5 mg)-Given           prochlorperazine (COMPAZINE) injection 5 mg  Dose: 5 mg Freq: EVERY 6 HOURS PRN Route: IV  PRN Reasons: nausea,vomiting  Start: 12/11/17 1428   Admin Instructions: This is Step 2 of nausea and vomiting management.   If nausea not resolved in 15 minutes, give metoclopramide (REGLAN) if ordered (step 3 of nausea and vomiting management)  For ordered doses up to 10 mg, give IV Push undiluted. Each 5mg over 1 minute.              Or  prochlorperazine (COMPAZINE) tablet 5 mg  Dose: 5 mg Freq: EVERY 6 HOURS PRN Route: PO  PRN Reasons: nausea,vomiting  Start: 12/11/17 1428   Admin Instructions: This is Step 2 of nausea and vomiting management.   If nausea not resolved in 15 minutes, give metoclopramide (REGLAN) if ordered (step 3 of nausea and vomiting management)               senna-docusate (SENOKOT-S;PERICOLACE) 8.6-50 MG per tablet 1-2 tablet  Dose: 1-2 tablet Freq: 2 TIMES DAILY Route: PO  Start: 12/11/17 2100   Admin Instructions: Start with 1 tablet PO BID, If no bowel movement in 24 hours, increase to 2 tablets PO BID.  Hold for loose stools.        2020 (1 tablet)-Given        0905 (1 tablet)-Given       2036 (2 tablet)-Given        0801 (2 tablet)-Given       2049 (2 tablet)-Given        0806 (2 tablet)-Given       [ ] 2100           sodium chloride (PF) 0.9% PF flush 3 mL  Dose: 3 mL Freq: EVERY 8 HOURS Route: IK  Start: 12/11/17 1430   Admin Instructions: And Q1H PRN, to lock peripheral IV dormant line.        (1554)-Not Given       (2259)-Not Given               (1452)-Not Given       (2204)-Not Given        (0610)-Not Given       1812 (3 mL)-Given        0207 (3 mL)-Given       0807 (3 mL)-Given       (1436)-Not Given       [ ] 2230           sodium chloride (PF) 0.9% PF flush 3 mL  Dose: 3 mL Freq: EVERY 1 HOUR PRN Route: IK  PRN Reason: line flush  PRN Comment: for peripheral IV flush post IV meds  Start: 12/11/17 1428             Completed  Medications  Medications 12/08/17 12/09/17 12/10/17 12/11/17 12/12/17 12/13/17 12/14/17         Dose: 1,000 mL Freq: ONCE Route: IV  Last Dose: 1,000 mL (12/12/17 0750)  Start: 12/12/17 0730   End: 12/12/17 1050        0750 (1,000 mL)-New Bag               Dose: 975 mg Freq: EVERY 8 HOURS Route: PO  Start: 12/11/17 1430   End: 12/14/17 1025   Admin Instructions: Do not use if patient has an active opioid/acetaminophen analgesic order for pain  Maximum acetaminophen dose from all sources = 75 mg/kg/day not to exceed 4 grams/day.        1746 (975 mg)-Given        0256 (975 mg)-Given       0904 (975 mg)-Given       1855 (975 mg)-Given        0201 (975 mg)-Given       1021 (975 mg)-Given       1809 (975 mg)-Given        0207 (975 mg)-Given       1025 (975 mg)-Given             Dose: 1 g Freq: EVERY 8 HOURS Route: IV  Indications of Use: PERIOPERATIVE PHARMACOPROPHYLAXIS  Last Dose: 1 g (12/12/17 0449)  Start: 12/11/17 2000   End: 12/12/17 0519   Admin Instructions: First post-op dose due 8 hours after intra-op dose, see eMAR.        2017 (1 g)-New Bag        0449 (1 g)-New Bag               Dose: 200 mg Freq: EVERY 24 HOURS Route: IV  Start: 12/13/17 1100   End: 12/14/17 1126         1154 (200 mg)-Given        1026 (200 mg)-Given          Discontinued Medications  Medications 12/08/17 12/09/17 12/10/17 12/11/17 12/12/17 12/13/17 12/14/17         Dose: 0.2 mg Freq: EVERY 2 HOURS PRN Route: IV  PRN Reason: severe pain  PRN Comment: or if patient unable to take PO  Start: 12/11/17 1428   End: 12/12/17 0712   Admin Instructions: Hold while on PCA.  For ordered doses up to 4 mg give IV Push undiluted. Administer each 2mg over 2-5 minutes.        1640 (0.2 mg)-Given [C]        0712-Med Discontinued           Dose: 0.1-0.4 mg Freq: EVERY 2 MIN PRN Route: IV  PRN Reason: opioid reversal  Start: 12/11/17 1428   End: 12/12/17 1427   Admin Instructions: For apnea or imminent respiratory arrest: give 0.4 mg IV undiluted Q 2 minutes  PRN until desired degree of reversal is obtained, stop opioid and notify provider. Continue monitoring until discharge criteria are met for a minimum of 2 hours.  For severe sedation, decrease in respiratory depth, quality or respiratory rate less than 8: give 0.1 mg IV Q 2 minutes x 3 doses, stop opioid and notify provider.  Try to minimize reversal of analgesia especially in end-of-life patients  For ordered doses up to 2mg give IVP. Give each 0.4mg over 15 seconds in emergency situations. For non-emergent situations further dilute in 9mL of NS to facilitate titration of response.         1427-Med Discontinued           Dose: 5 mg Freq: EVERY 3 HOURS PRN Route: PO  PRN Reason: moderate to severe pain  Start: 12/11/17 1428   End: 12/12/17 0712   Admin Instructions: IF CrCl is UNKNOWN start at lowest end of dosing range.  Hold while on PCA or with regular IV opioid dosing.        2020 (5 mg)-Given        0031 (5 mg)-Given       0417 (5 mg)-Given       0712-Med Discontinued

## 2017-12-11 NOTE — IP AVS SNAPSHOT
"[place] [address] [facility]: [phone]                                              INTERAGENCY TRANSFER FORM - PHYSICIAN ORDERS   2017                    Hospital Admission Date: 2017  SLOANE VALDEZ   : 1930  Sex: Female        Attending Provider: Rj Murphy MD     Allergies:  Propofol    Infection:  None   Service:  SURGERY    Ht:  1.499 m (4' 11\")   Wt:  54.4 kg (120 lb)   Admission Wt:  54.4 kg (120 lb)    BMI:  24.24 kg/m 2   BSA:  1.5 m 2            Patient PCP Information     Provider PCP Type    [facility]      ED Clinical Impression     Diagnosis Description Comment Added By Time Added    Status post left hip replacement [Z96.642] Status post left hip replacement [Z96.642]  Soraida Chaudhary PA-C 2017  8:34 AM      Hospital Problems as of 2017              Priority Class Noted POA    Hip joint replacement status Medium  2017 Yes      Non-Hospital Problems as of 2017              Priority Class Noted    Femur fracture, left (H) Medium  2016    Closed right hip fracture (H) Medium  2016      Code Status History     Date Active Date Inactive Code Status Order ID Comments User Context    2017  8:40 AM  Full Code [id number]  Soraida Chaudhary PA-C Outpatient    2017  2:28 PM 2017  8:40 AM Full Code [id number]  Soraida Chaudhary PA-C Inpatient    2016  2:05 PM 2016  4:29 PM DNR/DNI [id number]  Maldonado Koo MD Inpatient    2016  7:30 PM 2016  2:05 PM DNR/DNI [id number]  Kolby Jones MD Inpatient    2011 11:07 AM 2016  7:30 PM Full Code [id number]  Rj Murphy MD Outpatient    11/10/2011 11:24 AM 2011 11:07 AM Full Code [id number]  Erin Rendon RN Inpatient         Medication Review      UNREVIEWED medications. Ask your doctor about these medications        Dose / Directions Comments    mupirocin 2 % " nasal ointment   Commonly known as:  BACTROBAN   Ask about: Should I take this medication?        Dose:  1 each   Apply 1 each into each nare 2 times daily   Refills:  0          START taking        Dose / Directions Comments    enoxaparin 40 MG/0.4ML injection   Commonly known as:  LOVENOX        Dose:  40 mg   Inject 0.4 mLs (40 mg) Subcutaneous every 24 hours for 10 days   Refills:  0        ondansetron 4 MG ODT tab   Commonly known as:  ZOFRAN-ODT        Dose:  4 mg   Take 1 tablet (4 mg) by mouth every 6 hours as needed for nausea or vomiting   Quantity:  120 tablet   Refills:  0        oxyCODONE IR 5 MG tablet   Commonly known as:  ROXICODONE        Dose:  5-10 mg   Take 1-2 tablets (5-10 mg) by mouth every 3 hours as needed for moderate to severe pain   Quantity:  60 tablet   Refills:  0          CONTINUE these medications which may have CHANGED, or have new prescriptions. If we are uncertain of the size of tablets/capsules you have at home, strength may be listed as something that might have changed.        Dose / Directions Comments    senna-docusate 8.6-50 MG per tablet   Commonly known as:  SENOKOT-S;PERICOLACE   This may have changed:    - when to take this  - reasons to take this        Dose:  1-2 tablet   Take 1-2 tablets by mouth 2 times daily   Quantity:  100 tablet   Refills:  0          CONTINUE these medications which have NOT CHANGED        Dose / Directions Comments    alendronate 70 MG tablet   Commonly known as:  FOSAMAX        Dose:  70 mg   Take 70 mg by mouth every 7 days (Sunday)   Refills:  0        AMLODIPINE BESYLATE PO        Dose:  5 mg   Take 5 mg by mouth every morning   Refills:  0        ASPIRIN EC PO        Dose:  81 mg   Take 81 mg by mouth every morning   Refills:  0        ATORVASTATIN CALCIUM PO        Dose:  40 mg   Take 40 mg by mouth daily (with dinner)   Refills:  0        calcium-vitamin D 600-400 MG-UNIT per tablet   Commonly known as:  CALTRATE        Dose:  1 tablet    Take 1 tablet by mouth daily (with dinner)   Refills:  0        citalopram 40 MG tablet   Commonly known as:  celeXA        Dose:  40 mg   Take 40 mg by mouth every morning   Refills:  0        MULTI-VITAMIN PO        Dose:  1 tablet   Take 1 tablet by mouth daily (with dinner)   Refills:  0        omeprazole 40 MG capsule   Commonly known as:  priLOSEC        Dose:  40 mg   Take 40 mg by mouth every morning   Refills:  0        oxybutynin 5 MG tablet   Commonly known as:  DITROPAN        Dose:  5 mg   Take 5 mg by mouth 2 times daily.   Refills:  0        TYLENOL PO        Dose:  500-1000 mg   Take 500-1,000 mg by mouth every 8 hours as needed for mild pain or fever   Refills:  0        VITAMIN D (CHOLECALCIFEROL) PO        Dose:  2000 Units   Take 2,000 Units by mouth every morning   Refills:  0        WELLBUTRIN SR PO        Dose:  150 mg   Take 150 mg by mouth every morning   Refills:  0                Summary of Visit     Reason for your hospital stay       S/p left total hip arthroplasty  Antibiotics  Therapy  Pain control             After Care     Activity - Up with nursing assistance           Advance Diet as Tolerated       Follow this diet upon discharge: Orders Placed This Encounter      Room Service      Advance Diet as Tolerated: Regular Diet Adult       Bladder scan       X 2 for post void residual as needed       Fall precautions           General info for SNF       Length of Stay Estimate: Short Term Care: Estimated # of Days <30  Condition at Discharge: Improving  Level of care:skilled   Rehabilitation Potential: Good  Admission H&P remains valid and up-to-date: Yes  Recent Chemotherapy: N/A  Use Nursing Home Standing Orders: Yes       Mantoux instructions       Give two-step Mantoux (PPD) Per Facility Policy Yes       Weight bearing status       WBAT       Wound care (specify)       Site:   Left hip  Instructions:  Daily dressing changes/as needed. Keep incision clean, dry, intact              Referrals     Occupational Therapy Adult Consult       Evaluate and treat as clinically indicated.    Reason:  S/p left total hip arthroplasty       Physical Therapy Adult Consult       Evaluate and treat as clinically indicated. Gait training and strengthening    Reason:  S/p left total hip arthroplasty             Supplies     AntiEmbolism Stockings       Bilateral below knee length.On in the morning, off at night             Follow-Up Appointment Instructions     Future Labs/Procedures    Follow Up and recommended labs and tests     Comments:    Follow up with Dr. Murphy or Soraida Chaudhary PA-C in 2 weeks.  No follow up labs or test are needed.      Follow-Up Appointment Instructions     Follow Up and recommended labs and tests       Follow up with Dr. Murphy or Soraida Chaudhary PA-C in 2 weeks.  No follow up labs or test are needed.             Statement of Approval     Ordered          12/13/17 0840  I have reviewed and agree with all the recommendations and orders detailed in this document.  EFFECTIVE NOW     Approved and electronically signed by:  Soraida Chaudhary PA-C

## 2017-12-11 NOTE — IP AVS SNAPSHOT
[address] [facility]    [address] [place] [address]    Phone:  [phone]                                       After Visit Summary   12/11/2017    Parvin Wen    MRN: [id number]           After Visit Summary Signature Page     I have received my discharge instructions, and my questions have been answered. I have discussed any challenges I see with this plan with the nurse or doctor.    ..........................................................................................................................................  Patient/Patient Representative Signature      ..........................................................................................................................................  Patient Representative Print Name and Relationship to Patient    ..................................................               ................................................  Date                                            Time    ..........................................................................................................................................  Reviewed by Signature/Title    ...................................................              ..............................................  Date                                                            Time

## 2017-12-11 NOTE — IP AVS SNAPSHOT
"` `     [place] [address] [facility]: [phone]                                              INTERAGENCY TRANSFER FORM - NURSING   2017                    Hospital Admission Date: 2017  SLOANE VALDEZ   : 1930  Sex: Female        Attending Provider: Rj Murphy MD     Allergies:  Propofol    Infection:  None   Service:  SURGERY    Ht:  1.499 m (4' 11\")   Wt:  54.4 kg (120 lb)   Admission Wt:  54.4 kg (120 lb)    BMI:  24.24 kg/m 2   BSA:  1.5 m 2            Patient PCP Information     Provider PCP Type    Jac Canada      Current Code Status     Date Active Code Status Order ID Comments User Context       Prior      Code Status History     Date Active Date Inactive Code Status Order ID Comments User Context    2017  8:40 AM  Full Code [id number]  Soraida Chaudhary PA-C Outpatient    2017  2:28 PM 2017  8:40 AM Full Code [id number]  Soraida Chaudhary PA-C Inpatient    2016  2:05 PM 2016  4:29 PM DNR/DNI [id number]  Maldonado Koo MD Inpatient    2016  7:30 PM 2016  2:05 PM DNR/DNI [id number]  Kolby Jones MD Inpatient    2011 11:07 AM 2016  7:30 PM Full Code [id number]  Rj Murphy MD Outpatient    11/10/2011 11:24 AM 2011 11:07 AM Full Code [id number]  Erin Rendon RN Inpatient      Advance Directives        Does patient have a scanned Advance Directive/ACP document in EPIC?           No        Hospital Problems as of 2017              Priority Class Noted POA    Hip joint replacement status Medium  2017 Yes      Non-Hospital Problems as of 2017              Priority Class Noted    Femur fracture, left (H) Medium  2016    Closed right hip fracture (H) Medium  2016      Immunizations     Name Date      Influenza (High Dose) 3 valent vaccine 16          END      ASSESSMENT     Discharge Profile Flowsheet     EXPECTED " DISCHARGE     Resources List  Transitional Care 11/22/16 1420    Expected Discharge Date  12/14/17 12/14/17 1455   PAS Number  [id number] 12/14/17 1455    DISCHARGE NEEDS ASSESSMENT     Senior Linkage Line Referral Placed  12/14/17 12/14/17 1455    Concerns To Be Addressed  discharge planning concerns 11/22/16 0945   Referrals Placed  Post Acute Facilities;Senior Linkage Line;Transportation 11/22/16 1420    Patient/family verbalizes understanding of discharge plan recommendations?  Yes 12/14/17 1455   SKIN      Medical Team notified of plan?  yes 12/14/17 1455   Inspection of bony prominences  Full 12/14/17 0943    Equipment Currently Used at Home  tub bench;walker, rolling 12/11/17 1607   Procedural focused assessment (identify areas inspected)   Occiput;Nose;Cheek, left;Cheek, right;Ear, left;Ear, right;Scapula, left;Scapula, right;Elbow, left;Elbow, right;Abdomen;Spine;Hip, left;Hip, right;Buttock, left;Buttock, right;Sacrum;Coccyx;Knee, left;Knee, right;Ankle, left;Ankle, right 12/11/17 1243    Transportation Available  family or friend will provide 12/11/17 1607   Inspection under devices  Full 12/13/17 1245    # of Referrals Placed by CTS  Senior Linkage Line;Post Acute Facilities;Transportation 12/14/17 1455   Skin WDL  ex 12/14/17 0943    GASTROINTESTINAL (ADULT,PEDIATRIC,OB)     Skin Temperature  warm 12/14/17 0112    GI WDL  WDL 12/14/17 0943   Skin Moisture  dry 12/14/17 0943    All Quadrants Bowel Sounds  audible and active in all quadrants 12/14/17 0943   Skin Integrity  incision(s) 12/14/17 0943    Last Bowel Movement  12/11/17 12/14/17 0943   Full except areas not inspected   Hip, left (under dressing) 12/14/17 0943    Passing flatus  yes 12/14/17 0943   Not Inspected under devices.  Other (L hip dressing) 12/13/17 0532    COMMUNICATION ASSESSMENT     SAFETY      Patient's communication style  spoken language (English or Bilingual) 12/11/17 0915   Safety WDL  WDL 12/14/17 0943    FINAL RESOURCES      "All Alarms  alarm(s) activated and audible 12/14/17 0943                 Assessment WDL (Within Defined Limits) Definitions           Safety WDL     Effective: 09/28/15    Row Information: <b>WDL Definition:</b> Bed in low position, wheels locked; call light in reach; upper side rails up x 2; ID band on<br> <font color=\"gray\"><i>Item=AS safety wdl>>List=AS safety wdl>>Version=F14</i></font>      Skin WDL     Effective: 09/28/15    Row Information: <b>WDL Definition:</b> Warm; dry; intact; elastic; without discoloration; pressure points without redness<br> <font color=\"gray\"><i>Item=AS skin wdl>>List=AS skin wdl>>Version=F14</i></font>      Vitals     Vital Signs Flowsheet     VITAL SIGNS     Pain Orientation  Left 12/12/17 0509    Temp  98.1  F (36.7  C) 12/14/17 1548   Pain Descriptors  Constant;Aching 12/12/17 0508    Temp src  Oral 12/14/17 1548   Pain Management Interventions  medication offered but refused 12/11/17 1412    Resp  18 12/14/17 1548   Pain Intervention(s)  Medication (See eMAR) 12/14/17 1410    Pulse  71 12/14/17 1548   Response to Interventions  Decrease in pain 12/14/17 1410    Heart Rate  65 12/14/17 1548   ANALGESIA SIDE EFFECTS MONITORING      Pulse/Heart Rate Source  Monitor 12/14/17 1548   Side Effects Monitoring: Respiratory Quality  R 12/14/17 1455    BP  112/56 12/14/17 1548   Side Effects Monitoring: Respiratory Depth  N 12/14/17 1455    BP Location  Left arm 12/14/17 1548   Side Effects Monitoring: Sedation Level  1 12/14/17 1455    Patient Position  Lying 12/11/17 1328   HEIGHT AND WEIGHT      OXYGEN THERAPY     Height  1.499 m (4' 11\") 12/11/17 0913    SpO2  92 % 12/14/17 1548   Weight  54.4 kg (120 lb) 12/11/17 0913    O2 Device  None (Room air) 12/14/17 1548   BSA (Calculated - sq m)  1.51 12/11/17 0913    Oxygen Delivery  1 LPM 12/13/17 2300   BMI (Calculated)  24.29 12/11/17 0913    RESPIRATORY MONITORING     POSITIONING      Respiratory Monitoring (EtCO2)  38 mmHg 12/12/17 0595   " Body Position  supine, head elevated 12/14/17 0943    Integrated Pulmonary Index (IPI)  8-9 12/12/17 1515   Head of Bed (HOB)  HOB at 30-45 degrees 12/14/17 0943    PAIN/COMFORT     Positioning/Transfer Devices  pillows;in use 12/14/17 0943    Patient Currently in Pain  yes 12/14/17 1455   ECG      Preferred Pain Scale  number (Numeric Rating Pain Scale) 12/14/17 0933   ECG Rhythm  Sinus rhythm 12/11/17 1412    Patient's Stated Pain Goal  1 12/14/17 0933   DAILY CARE      0-10 Pain Scale  5 12/14/17 1454   Activity Management  up to bedside commode 12/14/17 0943    Word Pain Scale  8 12/11/17 1818   Activity Assistance Provided  assistance, 1 person 12/14/17 0943    Pain Location  Hip 12/12/17 0509                 Patient Lines/Drains/Airways Status    Active LINES/DRAINS/AIRWAYS     Name: Placement date: Placement time: Site: Days: Last dressing change:    Incision/Surgical Site 12/11/17 Left Hip 12/11/17   1217    3             Patient Lines/Drains/Airways Status    Active PICC/CVC     None            Intake/Output Detail Report     Date Intake         Output       Net    Shift P.O. I.V. IV Piggyback Colloid Blood Components Total Urine Emesis/NG output Drains Blood Total       Day 12/13/17 0700 - 12/13/17 1459 480 -- -- -- -- 480 400 -- -- -- 400 80    Lisa 12/13/17 1500 - 12/13/17 2259 300 -- -- -- -- 300 -- -- -- -- -- 300    Noc 12/13/17 2300 - 12/14/17 0659 -- -- -- -- -- -- 450 -- -- -- 450 -450    Day 12/14/17 0700 - 12/14/17 1459 -- -- -- -- -- -- 900 -- -- -- 900 -900    Lisa 12/14/17 1500 - 12/14/17 2259 120 -- -- -- -- 120 -- -- -- -- -- 120      Last Void/BM       Most Recent Value    Urine Occurrence 1 at 12/14/2017 1400    Stool Occurrence       Case Management/Discharge Planning     Case Management/Discharge Planning Flowsheet     REFERRAL INFORMATION     ASSESSMENT/CONCERNS TO BE ADDRESSED      Did the Initial Social Work Assessment result in a Social Work Case?  Yes 12/13/17 9566   Concerns To Be  Addressed  discharge planning concerns 11/22/16 0945    Admission Type  inpatient 12/13/17 1047   DISCHARGE PLANNING      Arrived From  home or self-care 12/13/17 1047   Patient/family verbalizes understanding of discharge plan recommendations?  Yes 12/14/17 1455    Referral Source  physician 12/13/17 1047   Medical Team notified of plan?  yes 12/14/17 1455    # of Referrals Placed by CTS  Senior Linkage Line;Post Acute Facilities;Transportation 12/14/17 1455   Transportation Available  family or friend will provide 12/11/17 1607    Reason For Consult  discharge planning 12/13/17 1047   Skilled Nursing Facility  -- ([facility]) 11/13/11 0937    Record Reviewed  medical record 12/13/17 1047   Skilled Nursing Facility Phone Number  -- [phone]) 11/13/11 0937    CTS Assigned to Case  Darcie Negron 12/13/17 1047   FINAL RESOURCES      Primary Care MD Name  Jac Roldan MD 11/22/16 0945   Equipment Currently Used at Home  tub bench;walker, rolling 12/11/17 1607    LIVING ENVIRONMENT     Resources List  Transitional Care 11/22/16 1420    Lives With  alone 12/13/17 1047   PAS Number  [id number] 12/14/17 1455    Living Arrangements  house 12/13/17 1047   Senior Linkage Line Referral Placed  12/14/17 12/14/17 1455    Provides Primary Care For  no one 12/13/17 1047   Referrals Placed  Post Acute Facilities;Senior Linkage Line;Transportation 11/22/16 1420    Able to Return to Prior Living Arrangements  no 12/13/17 1047   ABUSE RISK SCREEN      COPING/STRESS     QUESTION TO PATIENT:  Has a member of your family or a partner(now or in the past) intimidated, hurt, manipulated, or controlled you in any way?  no 12/11/17 0918    Major Change/Loss/Stressor  surgery/procedure 12/11/17 0915   QUESTION TO PATIENT: Do you feel safe going back to the place where you are living?  yes 12/11/17 0918    Patient Personal Strengths  expressive of needs;motivated;positive attitude;strong support system 11/22/16 0945   OBSERVATION: Is  there reason to believe there has been maltreatment of a vulnerable adult (ie. Physical/Sexual/Emotional abuse, self neglect, lack of adequate food, shelter, medical care, or financial exploitation)?  no 12/11/17 0918    EXPECTED DISCHARGE     (R) MENTAL HEALTH SUICIDE RISK      Expected Discharge Date  12/14/17 12/14/17 1455   Are you depressed or being treated for depression?  No 12/11/17 0951

## 2017-12-11 NOTE — OP NOTE
DATE OF PROCEDURE:  12/11/2017      PREOPERATIVE DIAGNOSIS:  Osteoarthritis, left hip/status post healed intertrochanteric hip fracture.      POSTOPERATIVE DIAGNOSIS:  Osteoarthritis, left hip/status post healed intertrochanteric hip fracture.      PROCEDURE:  Removal of hardware, left total hip replacement.      SURGEON:  Rj Murphy Jr, MD      ASSISTANT:   MARIOLA MCCRAY PA-C      ANESTHESIA:  General.       DESCRIPTION OF PROCEDURE:  Pravin Wen was brought to the operating room, general anesthesia was induced.  Prophylactic IV antibiotics and tranexamic acid were administered.  A Wilson catheter was placed.  She was then carefully rolled to the right lateral decubitus position.  An axillary roll and pelvic positioner are utilized.  The down extremities were very carefully padded.  The left lower extremity, buttock, groin, and flank were all prepped and draped in customary fashion.      A brief timeout was held to verify procedure and laterality.      A posterior approach to the hip was chosen.  This incision was made incorporating the scar from her previous internal fixation with a sliding Wheeler hip screw.  Although this incision was a little bit anterior to be ideal placement, it was satisfactory, we do the entire length of the incision in order to remove the hardware.  Dissection was carried down to the fascia which is divided in line with the skin incision.  The Charnley retractor was placed.  Proximally, the bursa is divided.  The short external rotators were sequentially divided off bone.  The hip capsule is excised, and the hip was dislocated.      The hip was then relocated, pending removal of the hardware.      The lateralis fascia was incised longitudinally, the muscle was dissected up off the lateral intermuscular septum.  The sliding hip screw was identified.  Four bicortical screws were removed.  The set screw was also removed for a total of 5 screws.  The plate is then disimpacted and  removed and the very large lag screw was finally removed.  The bone is in good condition.      The hip was then dislocated once again.  The femoral head was excised at an appropriate level and inclination.  Anterior capsulotomy is performed.  An anterior cobra retractor was placed.  The acetabulum is reamed up to 53 mm, and a Biomet RingLoc Plus size 54 acetabular shell was impacted into place.  Three 6.5 x 30 mm screws are placed up into the ilium.  A trial liner is placed.  Attention was then turned back to the femur.  The anatomy is significantly distorted by the previous fracture and hardware placement.  In order to get proper version of the femoral component, we elect to use a modular system.  This will also allow us to have our stem go distal to the most distal screw hole.  The superolateral neck is removed with a box chisel.  Serial reaming is performed by hand up to size for Rubén 50 mm x 190 stem.  Based off the reamer, we ream for the proximal body portion.  A real stem was then impacted into place.  The biggest problem we have in this case is getting the trial stem out.  Apparently the threads were in poor condition, and after multiple efforts with Rubén instruments, we ended up using the universal intramedullary nail removal system, which are removed with ease.  The real stem was then impacted.  A size A x 50 mm body is then placed over the stem, the locking screw is placed.  Based on trial reductions, we choose the -6 femoral head.  This gives us apparently equal leg lengths.  At 90 degrees of flexion, we rotated 70 degrees, she is stable in extension and external rotation.      The real size 24 liner and the real 36 mm head were then impacted into place.  Final reduction was performed the above measurements were repeated.      A liter of dilute Betadine solution was then placed in the wound and allowed to sit for 3 minutes.  This was then irrigated away with antibiotic solution.  Two medium Hemovacs  were brought out via separate stab incisions.  The lateralis fascia was closed with running 0 Vicryl.  The deep fascia was closed with interrupted 0 Ethibond figure-of-eight sutures.  2-0 Vicryl was used in the subcu, and the skin is reapproximated with skin clips.  A bulky sterile dressing and abduction pillow were applied.  The patient is taken to the recovery room in satisfactory condition.  Final counts are correct.         CHIRAG IBRAHIM JR, MD             D: 2017 12:25   T: 2017 15:15   MT: BROWN#[id number]      Name:     SLOANE VALDEZ   MRN:      [id number]        Account:        [id number]   :      1930           Procedure Date: 2017      Document: [id number]

## 2017-12-11 NOTE — PROGRESS NOTES
12/11/17 1600   Quick Adds   Type of Visit Initial PT Evaluation   Living Environment   Lives With alone   Living Arrangements house   Home Accessibility tub/shower is not walk in   Number of Stairs to Enter Home 2  (2 grab bars)   Number of Stairs Within Home 13  (one rail)   Transportation Available family or friend will provide   Living Environment Comment Has a cleaning lady 1x/week. Manages her own medications, uses a pillbox. Per daughter, patient has been going downstairs to do her own laundry recently.    Self-Care   Usual Activity Tolerance good   Current Activity Tolerance poor   Equipment Currently Used at Home tub bench;walker, rolling   Functional Level Prior   Ambulation 1-->assistive equipment  (4WW)   Transferring 1-->assistive equipment   Toileting 1-->assistive equipment  (Tub bench)   Bathing 0-->independent   Dressing 1-->assistive equipment  (Sock aide)   Fall history within last six months no   General Information   Onset of Illness/Injury or Date of Surgery - Date 12/11/17   Referring Physician MD Katherine   Patient/Family Goals Statement To go to rehab   Pertinent History of Current Problem (include personal factors and/or comorbidities that impact the POC) 87 year old female s/p removal of L hip hardware (from previous L hip fx repair) and L MONA.    Precautions/Limitations fall precautions;left hip precautions   Weight-Bearing Status - LLE weight-bearing as tolerated   General Info Comments Posterior or posterior-lateral hip precautions   Cognitive Status Examination   Orientation orientation to person, place and time   Level of Consciousness alert;confused   Follows Commands and Answers Questions able to follow single-step instructions   Pain Assessment   Patient Currently in Pain No  (Denies at rest, 8/10 with moving)   Integumentary/Edema   Integumentary/Edema Comments Dressing clean, dry and intact. +hemovac.    Range of Motion (ROM)   ROM Comment Decreased L LE AROM secondary to  "pain, weakness   Strength   Strength Comments Needs AAROM with L LE exercises and physical assist with mobility   Bed Mobility   Bed Mobility Comments Trialed supine to sit with min A x 2, not able to complete.    Sensory Examination   Sensory Perception Comments Denies burning, numbness and tingling   Modality Interventions   Planned Modality Interventions Cryotherapy   Planned Modality Interventions Comments PRN   General Therapy Interventions   Planned Therapy Interventions bed mobility training;gait training;ROM;strengthening;transfer training;home program guidelines;progressive activity/exercise   Clinical Impression   Criteria for Skilled Therapeutic Intervention yes, treatment indicated   PT Diagnosis Impaired gait   Influenced by the following impairments Pain, decreased L LE AROM/strength, impaired balance   Functional limitations due to impairments Decreased IND with functional mobility   Clinical Presentation Evolving/Changing   Clinical Presentation Rationale High fluctuating levels of pain   Clinical Decision Making (Complexity) Low complexity   Therapy Frequency` 2 times/day   Predicted Duration of Therapy Intervention (days/wks) 3 days   Anticipated Discharge Disposition Transitional Care Facility   Risk & Benefits of therapy have been explained Yes   Patient, Family & other staff in agreement with plan of care Yes   [facility] [employer] \"6 Clicks\"   2016, Trustees of [facility], under license to [employer].  All rights reserved.   6 Clicks Short Forms Basic Mobility Inpatient Short Form   [facility] [id number]  \"6 Clicks\" V.2 Basic Mobility Inpatient Short Form   1. Turning from your back to your side while in a flat bed without using bedrails? 2 - A Lot   2. Moving from lying on your back to sitting on the side of a flat bed without using bedrails? 2 - A Lot   3. Moving to and from a bed to a chair (including a wheelchair)? 1 - Total   4. Standing up from a chair using your arms " (e.g., wheelchair, or bedside chair)? 1 - Total   5. To walk in hospital room? 1 - Total   6. Climbing 3-5 steps with a railing? 1 - Total   Basic Mobility Raw Score (Score out of 24.Lower scores equate to lower levels of function) 8   Total Evaluation Time   Total Evaluation Time (Minutes) 10

## 2017-12-11 NOTE — PROGRESS NOTES
Admission medication history interview status for the 12/11/2017  admission is complete. See EPIC admission navigator for prior to admission medications     Medication history source reliability:Moderate    Medication history interview source(s):Patient    Medication history resources (including written lists, pill bottles, clinic record):Patient brought in a med list on day of procedure.    Primary pharmacy.[facility], [facility], [place].    Additional medication history information not noted on PTA med list :None    Time spent in this activity: 30 minutes    Prior to Admission medications    Medication Sig Last Dose Taking? Auth Provider   Acetaminophen (TYLENOL PO) Take 500-1,000 mg by mouth every 8 hours as needed for mild pain or fever 500 mg on 12/10/2017 at 2030 Yes Reported, Patient   ASPIRIN EC PO Take 81 mg by mouth every morning 12/6/2017 at am Yes Reported, Patient   senna-docusate (SENOKOT-S;PERICOLACE) 8.6-50 MG per tablet Take 1-2 tablets by mouth daily as needed for constipation 12/9/2017 at prn Yes Reported, Patient   VITAMIN D, CHOLECALCIFEROL, PO Take 2,000 Units by mouth every morning 12/10/2017 at am Yes Reported, Patient   mupirocin (BACTROBAN) 2 % nasal ointment Apply 1 each into each nare 2 times daily Not started yet. Yes Reported, Patient   BuPROPion HCl (WELLBUTRIN SR PO) Take 150 mg by mouth every morning  12/11/2017 at 0630 Yes Unknown, Entered By History   AMLODIPINE BESYLATE PO Take 5 mg by mouth every morning  12/11/2017 at 0630 Yes Unknown, Entered By History   ATORVASTATIN CALCIUM PO Take 40 mg by mouth daily (with dinner)  12/10/2017 at 1930 Yes Unknown, Entered By History   calcium-vitamin D (CALTRATE) 600-400 MG-UNIT per tablet Take 1 tablet by mouth daily (with dinner)  12/10/2017 at 1930 Yes Unknown, Entered By History   Multiple Vitamin (MULTI-VITAMIN PO) Take 1 tablet by mouth daily (with dinner)  12/10/2017 at 1930 Yes Reported, Patient   citalopram (CELEXA) 40 MG  tablet Take 40 mg by mouth every morning  12/11/2017 at 0630 Yes Reported, Patient   oxybutynin (DITROPAN) 5 MG tablet Take 5 mg by mouth 2 times daily. 12/10/2017 at 1930 Yes Reported, Patient   omeprazole (PRILOSEC) 40 MG capsule Take 40 mg by mouth every morning  12/10/2017 at am Yes Reported, Patient   ALENdronate (FOSAMAX) 70 MG tablet Take 70 mg by mouth every 7 days (Sunday) 12/10/2017 at am Yes Reported, Patient

## 2017-12-11 NOTE — ANESTHESIA POSTPROCEDURE EVALUATION
Patient: Parvin Wen    Procedure(s):  LEFT REMOVAL OF HIP HARDWARE  AND LEFT TOTAL HIP ARTHROPLASTY  - Wound Class: I-Clean   - Wound Class: I-Clean    Diagnosis:LEFT HIP DJD   Diagnosis Additional Information: No value filed.    Anesthesia Type:  General, ETT    Note:  Anesthesia Post Evaluation    Patient location during evaluation: PACU  Patient participation: Able to fully participate in evaluation  Level of consciousness: awake and alert  Pain management: adequate  Airway patency: patent  Cardiovascular status: acceptable, hemodynamically stable and blood pressure returned to baseline  Respiratory status: acceptable  Hydration status: acceptable  PONV: none     Anesthetic complications: None          Last vitals:  Vitals:    12/11/17 1410 12/11/17 1420 12/11/17 1445   BP: 119/58 109/53 111/55   Resp: 18 15 22   Temp:   36.9  C (98.4  F)   SpO2: 90% 95% 93%         Electronically Signed By: Blaire Vidales MD  December 11, 2017  3:12 PM

## 2017-12-11 NOTE — PLAN OF CARE
Problem: Patient Care Overview  Goal: Plan of Care/Patient Progress Review  Outcome: No Change  Pt arrived on the floor at 1445. VSS, A&Ox4 but lethargic. C/o mild numbness/tingling in lower extremities, but otherwise CMS intact. Dressing CDI. Wilson and hemovac patent. Denies any pain interventions at this time. Will continue to monitor.

## 2017-12-11 NOTE — IP AVS SNAPSHOT
"          [place] [address] [facility]: [phone]                                              INTERAGENCY TRANSFER FORM - LAB / IMAGING / EKG / EMG RESULTS   2017                    Hospital Admission Date: 2017  SLOANE VALDEZ   : 1930  Sex: Female        Attending Provider: Rj Murphy MD     Allergies:  Propofol    Infection:  None   Service:  SURGERY    Ht:  1.499 m (4' 11\")   Wt:  54.4 kg (120 lb)   Admission Wt:  54.4 kg (120 lb)    BMI:  24.24 kg/m 2   BSA:  1.5 m 2            Patient PCP Information     Provider PCP Type    [facility]         Lab Results - 3 Days      Creatinine [[id number]] (Abnormal)  Resulted: 17 07, Result status: Final result    Ordering provider: Rj Murphy MD  17 1800 Resulting lab: [facility]    Specimen Information    Type Source Collected On   Blood  17 0640          Components       Value Reference Range Flag Lab   Creatinine 0.90 0.52 - 1.04 mg/dL  FrStHsLb   GFR Estimate 59 >60 mL/min/1.7m2 L FrStHsLb   Comment:  Non  GFR Calc   GFR Estimate If Black 71 >60 mL/min/1.7m2  FrStHsLb   Comment:   GFR Calc            Platelet count [[id number]]  Resulted: 17 0700, Result status: Final result    Ordering provider: Rj Murphy MD  17 0000 Resulting lab: [facility]    Specimen Information    Type Source Collected On   Blood  17 0640          Components       Value Reference Range Flag Lab   Platelet Count 156 150 - 450 10e9/L  FrStHsLb            Hemoglobin [[id number]] (Abnormal)  Resulted: 17 0700, Result status: Final result    Ordering provider: Rj Murphy MD  17 0000 Resulting lab: [facility]    Specimen Information    Type Source Collected On   Blood  17 0640          Components       Value Reference Range Flag Lab   Hemoglobin 8.5 " 11.7 - 15.7 g/dL L FrStHsLb            Hemoglobin [[id number]] (Abnormal)  Resulted: 12/13/17 1758, Result status: Final result    Ordering provider: Rj Murphy MD  12/13/17 1100 Resulting lab: [facility]    Specimen Information    Type Source Collected On   Blood  12/13/17 1737          Components       Value Reference Range Flag Lab   Hemoglobin 7.1 11.7 - 15.7 g/dL L FrStHsLb            Hemoglobin [[id number]] (Abnormal)  Resulted: 12/13/17 0734, Result status: Final result    Ordering provider: Rj Murphy MD  12/13/17 0000 Resulting lab: [facility]    Specimen Information    Type Source Collected On   Blood  12/13/17 0711          Components       Value Reference Range Flag Lab   Hemoglobin 7.8 11.7 - 15.7 g/dL L FrStHsLb            Hemoglobin [[id number]] (Abnormal)  Resulted: 12/12/17 1646, Result status: Final result    Ordering provider: Soraida Chaudhary PA-C  12/12/17 1000 Resulting lab: [facility]    Specimen Information    Type Source Collected On   Blood  12/12/17 1620          Components       Value Reference Range Flag Lab   Hemoglobin 8.2 11.7 - 15.7 g/dL L FrStHsLb            Hemoglobin [[id number]] (Abnormal)  Resulted: 12/12/17 0645, Result status: Final result    Ordering provider: Rj Murphy MD  12/12/17 0001 Resulting lab: [facility]    Specimen Information    Type Source Collected On   Blood  12/12/17 0633          Components       Value Reference Range Flag Lab   Hemoglobin 7.9 11.7 - 15.7 g/dL L FrStHsLb            Platelet count [[id number]]  Resulted: 12/11/17 1601, Result status: Final result    Ordering provider: Rj Murphy MD  12/11/17 1200 Resulting lab: [facility]    Specimen Information    Type Source Collected On     12/11/17 1200          Components       Value Reference Range Flag Lab   Platelet Count 218 150 - 450 10e9/L  FrStHsLb             Hemoglobin [[id number]] (Abnormal)  Resulted: 12/11/17 1322, Result status: Final result    Ordering provider: Rj Murphy MD  12/11/17 1251 Resulting lab: [facility]    Specimen Information    Type Source Collected On   Blood  12/11/17 1200          Components       Value Reference Range Flag Lab   Hemoglobin 9.5 11.7 - 15.7 g/dL L FrStHsLb            Basic metabolic panel [[id number]] (Abnormal)  Resulted: 12/11/17 0958, Result status: Final result    Ordering provider: Blaire Vidales MD  12/11/17 0852 Resulting lab: [facility]    Specimen Information    Type Source Collected On   Blood  12/11/17 0918          Components       Value Reference Range Flag Lab   Sodium 139 133 - 144 mmol/L  FrStHsLb   Potassium 3.8 3.4 - 5.3 mmol/L  FrStHsLb   Chloride 106 94 - 109 mmol/L  FrStHsLb   Carbon Dioxide 27 20 - 32 mmol/L  FrStHsLb   Anion Gap 6 3 - 14 mmol/L  FrStHsLb   Glucose 92 70 - 99 mg/dL  FrStHsLb   Urea Nitrogen 20 7 - 30 mg/dL  FrStHsLb   Creatinine 0.93 0.52 - 1.04 mg/dL  FrStHsLb   GFR Estimate 57 >60 mL/min/1.7m2 L FrStHsLb   Comment:  Non  GFR Calc   GFR Estimate If Black 69 >60 mL/min/1.7m2  FrStHsLb   Comment:  African American GFR Calc   Calcium 8.8 8.5 - 10.1 mg/dL  FrStHsLb            Testing Performed By     Lab - Abbreviation Name Director Address Valid Date Range    14 - FrStHsLb [facility] Unknown [address] [place] [address] 05/08/15 1057 - Present            Unresulted Labs (24h ago through future)    Start       Ordered    12/14/17 0600  Platelet count  (enoxaparin (LOVENOX) (Weight  kg with CrCl greater than 30 mL/min is prechecked))  EVERY THREE DAYS,   Routine     Comments:  Repeat every 3 days while on VTE prophylaxis. If no result is listed, this lab has not been done the past 365 days. LATEST LAB RESULT: Platelet Count (10e9/L)       Date                     Value                  11/24/2016               114 (L)          ----------      12/11/17 1428    Unscheduled  Hemoglobin  CONDITIONAL X 2,   Routine     Comments:  IF morning Hemoglobin result is LESS than 8.0 on POD 1 and/or POD 2, release order and recheck Hemoglobin in the evening at 1700.  Notify Provider if second Hemoglobin result is LESS than 7.5.    12/11/17 1428         Imaging Results - 3 Days      XR Pelvis w Hip Port Left 1 View [[id number]]  Resulted: 12/11/17 1409, Result status: Final result    Ordering provider: Rj Murphy MD  12/11/17 1251 Resulted by: Milan Love MD    Performed: 12/11/17 1305 - 12/11/17 1325 Resulting lab: RADIOLOGY RESULTS    Narrative:       XR PELVIS AND HIP PORTABLE LEFT 1 VIEW 12/11/2017 1:25 PM    HISTORY: Post Op Hip Arthroplasty     COMPARISON: 11/20/2016    FINDINGS: Long stem left hip arthroplasty without radiographic  evidence of complication.      Impression:       IMPRESSION: Intact arthroplasty.    MILAN LOVE MD      Testing Performed By     Lab - Abbreviation Name Director Address Valid Date Range    104 - Rad Rslts RADIOLOGY RESULTS Unknown Unknown 02/16/05 1553 - Present                Encounter-Level Documents:     There are no encounter-level documents.      Order-Level Documents - 12/11/2017:     Scan on 12/8/2017  6:39 AM by Outside, Provider : EKGBESSY (below)

## 2017-12-11 NOTE — BRIEF OP NOTE
[facility] Brief Operative Note    Pre-operative diagnosis: LEFT HIP DJD    Post-operative diagnosis osteoarthritis/healed intertrochanteric fracture left hip      Procedure: Procedure(s):  LEFT REMOVAL OF HIP HARDWARE  AND LEFT TOTAL HIP ARTHROPLASTY  - Wound Class: I-Clean   - Wound Class: I-Clean   Surgeon(s): Surgeon(s) and Role:     * Rj Murphy MD - Primary     * Soraida Chaudhary PA-C - Assisting   Estimated blood loss: 250 mL    Specimens: * No specimens in log *   Findings: Please see the full operative report.

## 2017-12-11 NOTE — IP AVS SNAPSHOT
` ` Patient Information     Patient Name Sex Parvin Clifford ([id number]) Female 1930       Room Bed    5519 5519-02      Patient Demographics     Address Phone    [address] [address] [place] [address] [phone] (Home)  none (Work)  [phone] (Mobile)      Patient Ethnicity & Race     Ethnic Group Patient Race    American White      Emergency Contact(s)     Name Relation Home Work Mobile    JANNETTE CHASE Daughter [phone]      SHAN BAUTISTA Daughter [phone]        Documents on File        Status Date Received Description       Documents for the Patient    Privacy Notice - [place] Received 11/10/11     Insurance Card Received 11/10/11     External Medication Information Consent       Patient ID       Consent for Services - Hospital/Clinic Received () 11     Insurance Card Received 11/10/11     Consent for EHR Access  13 Copied from existing Consent for services - IP/ED collected on 2012    [facility] Specified Other       Consent for Services/Privacy Notice - Hospital/Clinic Received () 16     Insurance Card Received 16     Consent for EHR Access Received 16     HIM ARJUN Authorization  16     Care Everywhere Prospective Auth Received 17     Consent for Services/Privacy Notice - Hospital/Clinic Received 17        Documents for the Encounter    H/P - History and Physical  17 [facility], HISTORY AND PHY 2017    CMS IM for Patient Signature Received 17 1MM    EKG Cardiac - HIM Scan  17 EKG, [facility]    CE Point of Care Auth Received        Admission Information     Attending Provider Admitting Provider Admission Type Admission Date/Time    Rj Murphy MD Szalapski, Edward William, MD Elective 17  0753    Discharge Date Hospital Service Auth/Cert Status Service Area     Surgery Incomplete [facility] SERVICES    Unit Room/Bed Admission Status        55 ORTHO SPEC UNIT 5519/5519-02  Admission (Confirmed)       Admission     Complaint    LEFT HIP DJD , Hip joint replacement status      Hospital Account     Name Acct ID Class Status Primary Coverage    Parvin Wen [id number] Inpatient Open MEDICARE - MEDICARE            Guarantor Account (for Hospital Account #[id number])     Name Relation to Pt Service Area Active? Acct Type    Parvin Wen Self FCS Yes Personal/Family    Address Phone          [address] [place] [address] [phone](H)              Coverage Information (for Hospital Account #[id number])     1. MEDICARE/MEDICARE     F/O Payor/Plan Precert #    MEDICARE/MEDICARE     Subscriber Subscriber #    Parvin Wen [id number]    Address Phone    [employer]  [address]  [place] [address] [phone]          2. COMMERCIAL/B[employer] LIFE     F/O Payor/Plan Precert #    COMMERCIAL/BANK[employer] LIFE     Subscriber Subscriber #    Parvin Wen [id number]    Address Phone    [address]  [place] [address] [phone]

## 2017-12-11 NOTE — IP AVS SNAPSHOT
` `     [place] [address] [facility]: [phone]                 INTERAGENCY TRANSFER FORM - NOTES (H&P, Discharge Summary, Consults, Procedures, Therapies)   2017                    Hospital Admission Date: 2017  PARVIN VALDEZ   : 1930  Sex: Female        Patient PCP Information     Provider PCP Type    Jac [facility]         History & Physicals      H&P signed by Laura Edmondson at 2017  6:39 AM      Author:  Laura Edmondson Service:  (none) Author Type:  Physician    Filed:  2017  6:39 AM Date of Service:  2017  6:19 AM Creation Time:  2017  6:39 AM    Status:  Signed :  Laura Edmondson (Physician)     Delvis on 2017  6:39 AM by Delvis Provider : WENDIE DOHERTY AND JACQUIY 2017 1          Revision History        User Key Date/Time User Provider Type Action    > [N/A] 2017  6:39 AM Laura Edmondson Physician Sign                     Discharge Summaries      Discharge Summaries by Soraida Chaudhary PA-C at 2017  8:42 AM     Author:  Soraida Chaudhary PA-C Service:  Orthopedics Author Type:  Physician Assistant - C    Filed:  2017  8:42 AM Date of Service:  2017  8:42 AM Creation Time:  2017  8:40 AM    Status:  Attested :  Soraida Chaudhary PA-C (Physician Assistant - C)    Cosigner:  Rj Murphy MD at 2017 11:11 AM        Attestation signed by Rj Murphy MD at 2017 11:11 AM        Physician Attestation   I, Rj Murphy, personally saw and evaluated Parvin Valdez as part of a shared visit.  I have reviewed and discussed with the advanced practice provider their discharge plan.    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  The following problems were addressed during her hospitalization:    Summary: s/p removal of hardware and left total hip arthroplasty    Post-operative pain control: included Hydromorphone (dilaudid) IV and will be Oxycodone on discharge.     Medications discontinued or adjusted during this hospitalization: New narcotics initiated: Oxycodone     Antibiotics prescribed at discharge: None prescribed     Imaging study follow up needs:   -None performed    Soraida Chaudhary PA-C    Discharge Disposition   Discharged to TCU   Condition at discharge: Stable    Pending Results   Final pathology results: No pathology submitted    Unresulted Labs Ordered in the Past 30 Days of this Admission     No orders found from 10/12/2017 to 12/12/2017.          Primary Care Physician   Jac Roldan    Consultations This Hospital Stay   OCCUPATIONAL THERAPY ADULT IP CONSULT  PHYSICAL THERAPY ADULT IP CONSULT  SOCIAL WORK IP CONSULT  PHYSICAL THERAPY ADULT IP  CONSULT  OCCUPATIONAL THERAPY ADULT IP CONSULT    Discharge Orders     AntiEmbolism Stockings   Bilateral below knee length.On in the morning, off at night     General info for SNF   Length of Stay Estimate: Short Term Care: Estimated # of Days <30  Condition at Discharge: Improving  Level of care:skilled   Rehabilitation Potential: Good  Admission H&P remains valid and up-to-date: Yes  Recent Chemotherapy: N/A  Use Nursing Home Standing Orders: Yes     Mantoux instructions   Give two-step Mantoux (PPD) Per Facility Policy Yes     Reason for your hospital stay   S/p left total hip arthroplasty  Antibiotics  Therapy  Pain control     Bladder scan   X 2 for post void residual as needed     Wound care (specify)   Site:   Left hip  Instructions:  Daily dressing changes/as needed. Keep incision clean, dry, intact     Follow Up and recommended labs and tests   Follow up with Dr. Murphy or Soraida Chaudhary PA-C in 2 weeks.  No follow up labs or test are needed.     Activity - Up with nursing assistance     Weight bearing status   WBAT     Full Code     Physical Therapy Adult Consult   Evaluate and treat as clinically indicated. Gait training and strengthening    Reason:  S/p left total hip arthroplasty     Occupational Therapy Adult Consult   Evaluate and treat as clinically indicated.    Reason:  S/p left total hip arthroplasty     Fall precautions     Advance Diet as Tolerated   Follow this diet upon discharge: Orders Placed This Encounter     Room Service     Advance Diet as Tolerated: Regular Diet Adult       Discharge Medications   Current Discharge Medication List      START taking these medications    Details   oxyCODONE IR (ROXICODONE) 5 MG tablet Take 1-2 tablets (5-10 mg) by mouth every 3 hours as needed for moderate to severe pain  Qty: 60 tablet, Refills: 0    Associated Diagnoses: Status post left hip replacement      enoxaparin (LOVENOX) 40 MG/0.4ML injection Inject 0.4 mLs (40 mg) Subcutaneous every 24  "hours for 10 days    Associated Diagnoses: Status post left hip replacement      ondansetron (ZOFRAN-ODT) 4 MG ODT tab Take 1 tablet (4 mg) by mouth every 6 hours as needed for nausea or vomiting  Qty: 120 tablet, Refills: 0    Associated Diagnoses: Status post left hip replacement         CONTINUE these medications which have CHANGED    Details   senna-docusate (SENOKOT-S;PERICOLACE) 8.6-50 MG per tablet Take 1-2 tablets by mouth 2 times daily  Qty: 100 tablet, Refills: 0    Associated Diagnoses: Status post left hip replacement         CONTINUE these medications which have NOT CHANGED    Details   Acetaminophen (TYLENOL PO) Take 500-1,000 mg by mouth every 8 hours as needed for mild pain or fever      ASPIRIN EC PO Take 81 mg by mouth every morning      VITAMIN D, CHOLECALCIFEROL, PO Take 2,000 Units by mouth every morning      BuPROPion HCl (WELLBUTRIN SR PO) Take 150 mg by mouth every morning       AMLODIPINE BESYLATE PO Take 5 mg by mouth every morning       ATORVASTATIN CALCIUM PO Take 40 mg by mouth daily (with dinner)       calcium-vitamin D (CALTRATE) 600-400 MG-UNIT per tablet Take 1 tablet by mouth daily (with dinner)       Multiple Vitamin (MULTI-VITAMIN PO) Take 1 tablet by mouth daily (with dinner)       citalopram (CELEXA) 40 MG tablet Take 40 mg by mouth every morning       oxybutynin (DITROPAN) 5 MG tablet Take 5 mg by mouth 2 times daily.      omeprazole (PRILOSEC) 40 MG capsule Take 40 mg by mouth every morning       ALENdronate (FOSAMAX) 70 MG tablet Take 70 mg by mouth every 7 days (Sunday)         STOP taking these medications       mupirocin (BACTROBAN) 2 % nasal ointment Comments:   Reason for Stopping:             Allergies   Allergies   Allergen Reactions     Propofol      \"I got stiff as a board\"     Data[KW1.1]   Results for orders placed or performed during the hospital encounter of 12/11/17   XR Pelvis w Hip Port Left 1 View    Narrative    XR PELVIS AND HIP PORTABLE LEFT 1 VIEW " 12/11/2017 1:25 PM    HISTORY: Post Op Hip Arthroplasty     COMPARISON: 11/20/2016    FINDINGS: Long stem left hip arthroplasty without radiographic  evidence of complication.      Impression    IMPRESSION: Intact arthroplasty.    MILAN LOVE MD[KW1.3]        Revision History        User Key Date/Time User Provider Type Action    > KW1.3 12/13/2017  8:42 AM Soraida Chaudhary PA-C Physician Assistant - PRETTY Sign     KW1.2 12/13/2017  8:41 AM Soraida Chaudhary PA-C Physician Assistant - C      KW1.1 12/13/2017  8:40 AM Soraida Chaudhary PA-C Physician Assistant - PRETTY                   Consult Notes     No notes of this type exist for this encounter.         Progress Notes - Physician (Notes from 12/11/17 through 12/14/17)      Progress Notes by Darcie Negron MSW at 12/13/2017 10:48 AM     Author:  Darcie Negron MSW Service:  (none) Author Type:      Filed:  12/13/2017 11:07 AM Date of Service:  12/13/2017 10:48 AM Creation Time:  12/13/2017 10:48 AM    Status:  Signed :  Darcie Negron MSW ()         Care Transition Initial Assessment - AFTAB  Reason For Consult: discharge planning  Met with: Patient    Active Problems:    Hip joint replacement status         DATA  Lives With: alone  Living Arrangements: house    Identified issues/concerns regarding health management: SW was consulted for discharge planning. Patient is Parvin, an 87 year-old female who was admitted on 12/11 for a hip joint replacement. Her tentative discharge date is 12/14. AFTAB met with patient, and reviewed medical record. Prior to this admission, patient lived alone in a house with 2 stairs to enter and 13 stairs within. She has cleaning assistance once per week. She used a tub bench and a rolling walker. She was otherwise independent with ADLs. Per MD, the recommendation is TCU. Patient requested a room at [facility]. She did not have a preference on private or semi-private. SW placed  this referral via DOD. She would like w/c ride set up at discharge.        Transportation Available: family or friend will provide      ASSESSMENT  Cognitive Status:  awake and alert  Concerns to be addressed: Discharge planning.     PLAN  Financial costs for the patient includes Transportation costs.  Patient given options and choices for discharge TCU choices.  Patient/family is agreeable to the plan?  Yes  Patient Goals and Preferences: Discharge to [facility].  Patient anticipates discharging to:  TCU.[NO1.1]    Darcie Negron[NO1.2], CHUNG MA[NO1.1]             Revision History        User Key Date/Time User Provider Type Action    > NO1.2 12/13/2017 11:07 AM Darcie Negron MSW  Sign     NO1.1 12/13/2017 10:48 AM Darcie Negron MSW              Progress Notes by Soraida Chaudhary PA-C at 12/12/2017  7:12 AM     Author:  Soraida Chaudhary PA-C Service:  Orthopedics Author Type:  Physician Assistant - C    Filed:  12/12/2017  7:17 AM Date of Service:  12/12/2017  7:12 AM Creation Time:  12/12/2017  7:12 AM    Status:  Attested :  Soraida Chaudhary PA-C (Physician Assistant - C)    Cosigner:  Rj Murphy MD at 12/13/2017  8:22 AM        Attestation signed by Rj Murphy MD at 12/13/2017  8:22 AM        Physician Attestation   I agree with the information in this note.    Rj Murphy                               [facility]    Orthopedics  Daily Post-Op Note[KW1.1]    Assessment & Plan[KW1.2]   Procedure(s):  REMOVE HARDWARE HIP NAILING  ARTHROPLASTY HIP   -[KW1.1]1 Day Post-Op[KW1.2]     Doing well.  No immediate surgical complications identified.    Plan:    -Advance activity as tolerated, ambulate  -Continue supportive and symptomatic treatment  -Start or continue physical therapy  -Pain control measures- increased both Oxycodone and Dilaudid PRN  -Advance diet as tolerated  -Social work consult for  discharge planning, 12/14/17  -Low hemoglobin- start with fluid bolus and will recheck this afternoon, per Dr. Katherine LANIER[KW1.1]harpreet Chaudhary[KW1.2], JOSE[KW1.1]    Interval History[KW1.2]   Stable.  Doing well.  Improving slowly, reports occasional tingling in lower extremities.  Pain is not well tolerated, will increase dosing.  No fevers, chills, numbness, chest pain, sob, calf pain, n/v. -flatus, -bm[KW1.1]    Physical Exam   Temp: 98.3  F (36.8  C) Temp src: Axillary BP: 99/49 Pulse: 68 Heart Rate: 82 Resp: 21 SpO2: 95 % O2 Device: Nasal cannula Oxygen Delivery: 3 LPM  Vitals:    12/11/17 0911   Weight: 54.4 kg (120 lb)[KW1.2]     Vital Signs with Ranges[KW1.1]  Temp:  [96.3  F (35.7  C)-98.4  F (36.9  C)] 98.3  F (36.8  C)  Pulse:  [68-82] 68  Heart Rate:  [67-96] 82  Resp:  [12-22] 21  BP: ()/(48-97) 99/49  SpO2:  [90 %-100 %] 95 %  I/O last 3 completed shifts:  In: 1700 [P.O.:250; I.V.:1200]  Out: 1600 [Urine:975; Drains:375; Blood:250][KW1.2]    Constitutional: NAD, A&O, appears somewhat anxious, laying in bed with abduction pillow in place  ENT: NCAT  Respiratory: unlabored  Musculoskeletal: Dressing is C/D/I. Drain in place. Good motion bilateral lower extremities. Bilateral dorsalis pedis pulses are palpable. Bilateral calves are soft and nontedner  Neurologic: Distal sensation intact to light touch[KW1.1]      Medications     dextrose 5% and 0.45% NaCl + KCl 20 mEq/L 125 mL/hr at 12/11/17 0968        amLODIPine (NORVASC) tablet 5 mg  5 mg Oral QAM     aspirin EC EC tablet 81 mg  81 mg Oral QAM     atorvastatin (LIPITOR) tablet 40 mg  40 mg Oral Daily with supper     buPROPion (WELLBUTRIN SR) 12 hr tablet 150 mg  150 mg Oral QAM     calcium-vitamin D  1 tablet Oral Daily with supper     citalopram  40 mg Oral QAM     omeprazole  40 mg Oral QAM     oxybutynin  5 mg Oral BID     cholecalciferol  2,000 Units Oral QAM     sodium chloride (PF)  3 mL Intracatheter Q8H     enoxaparin  40 mg  Subcutaneous Q24H     acetaminophen  975 mg Oral Q8H     senna-docusate  1-2 tablet Oral BID       Data[KW1.2]   Results for orders placed or performed during the hospital encounter of 12/11/17 (from the past 24 hour(s))   ABO/Rh type and screen   Result Value Ref Range    ABO A     RH(D) Pos     Antibody Screen Neg     Test Valid Only At [facility]        Specimen Expires 12/14/2017    Basic metabolic panel   Result Value Ref Range    Sodium 139 133 - 144 mmol/L    Potassium 3.8 3.4 - 5.3 mmol/L    Chloride 106 94 - 109 mmol/L    Carbon Dioxide 27 20 - 32 mmol/L    Anion Gap 6 3 - 14 mmol/L    Glucose 92 70 - 99 mg/dL    Urea Nitrogen 20 7 - 30 mg/dL    Creatinine 0.93 0.52 - 1.04 mg/dL    GFR Estimate 57 (L) >60 mL/min/1.7m2    GFR Estimate If Black 69 >60 mL/min/1.7m2    Calcium 8.8 8.5 - 10.1 mg/dL   Hemoglobin   Result Value Ref Range    Hemoglobin 9.5 (L) 11.7 - 15.7 g/dL   Platelet count   Result Value Ref Range    Platelet Count 218 150 - 450 10e9/L   XR Pelvis w Hip Port Left 1 View    Narrative    XR PELVIS AND HIP PORTABLE LEFT 1 VIEW 12/11/2017 1:25 PM    HISTORY: Post Op Hip Arthroplasty     COMPARISON: 11/20/2016    FINDINGS: Long stem left hip arthroplasty without radiographic  evidence of complication.      Impression    IMPRESSION: Intact arthroplasty.    MILAN LOVE MD   Hemoglobin   Result Value Ref Range    Hemoglobin 7.9 (L) 11.7 - 15.7 g/dL[KW1.3]        Revision History        User Key Date/Time User Provider Type Action    > KW1.3 12/12/2017  7:17 AM Soraida Chaudhary PA-C Physician Assistant - PRETTY Sign     KW1.2 12/12/2017  7:13 AM Soraida Chaudhary PA-C Physician Assistant - C      KW1.1 12/12/2017  7:12 AM Soraida Chaudhary PA-C Physician Assistant - C             Progress Notes by Rj Murphy MD at 12/13/2017  8:21 AM     Author:  Rj Murphy MD Service:  Orthopedics Author Type:  Physician    Filed:  12/13/2017  8:22 AM  "Date of Service:  2017  8:21 AM Creation Time:  2017  8:21 AM    Status:  Signed :  Rj Murphy MD (Physician)         Parvin Wen  2017  POD #2    Doing well.  Pain well-controlled.    She is feeling much better today.    Temperatures:  Current - Temp: 98.8  F (37.1  C); Max - Temp  Av.1  F (37.3  C)  Min: 98.2  F (36.8  C)  Max: 100.2  F (37.9  C)  Pulse range: Pulse  Av  Min: 83  Max: 83  Blood pressure range: Systolic (24hrs), Av , Min:104 , Max:136   ; Diastolic (24hrs), Av, Min:50, Max:57    CMS: intact  Labs:[ES1.1]  Hemoglobin   Date Value Ref Range Status   2017 7.8 (L) 11.7 - 15.7 g/dL Final[ES1.2]   ]      PLAN:  Continue physical therapy  Discharge plan: Skilled Nursing Facility tomorrow  IV iron[ES1.1]     Revision History        User Key Date/Time User Provider Type Action    > ES1.2 2017  8:22 AM Rj Murphy MD Physician Sign     ES1.1 2017  8:21 AM Rj Murphy MD Physician             Progress Notes by Nel Perez, RN at 2017  8:21 AM     Author:  Nel Perez RN Service:  (none) Author Type:      Filed:  2017  8:22 AM Date of Service:  2017  8:21 AM Creation Time:  2017  8:21 AM    Status:  Signed :  Nel Perez, RN ()         Care Coordination:    Dr. Alejandra stopped at desk, recommending TCU for this patient.  \"Not doing well enough for home,\" and family unwilling/unable to provide the assistance pt needs.  SW consult already in place.      Nel Perez RN, BSN  [facility] Care Coordinator   Mobile Phone: [phone][LH1.1]       Revision History        User Key Date/Time User Provider Type Action    > LH1.1 2017  8:22 AM Nel Perez RN Case Manager Sign            Progress Notes by Danita Feng PT at 2017  4:54 PM     Author:  Danita Feng PT Service:  (none) Author Type:  Physical Therapist    " Filed:  12/11/2017  4:55 PM Date of Service:  12/11/2017  4:54 PM Creation Time:  12/11/2017  4:54 PM    Status:  Signed :  Danita Feng PT (Physical Therapist)            12/11/17 1600   Quick Adds   Type of Visit Initial PT Evaluation   Living Environment   Lives With alone   Living Arrangements house   Home Accessibility tub/shower is not walk in   Number of Stairs to Enter Home 2  (2 grab bars)   Number of Stairs Within Home 13  (one rail)   Transportation Available family or friend will provide   Living Environment Comment Has a cleaning lady 1x/week. Manages her own medications, uses a pillbox. Per daughter, patient has been going downstairs to do her own laundry recently.    Self-Care   Usual Activity Tolerance good   Current Activity Tolerance poor   Equipment Currently Used at Home tub bench;walker, rolling   Functional Level Prior   Ambulation 1-->assistive equipment  (4WW)   Transferring 1-->assistive equipment   Toileting 1-->assistive equipment  (Tub bench)   Bathing 0-->independent   Dressing 1-->assistive equipment  (Sock aide)   Fall history within last six months no   General Information   Onset of Illness/Injury or Date of Surgery - Date 12/11/17   Referring Physician MD Katherine   Patient/Family Goals Statement To go to rehab   Pertinent History of Current Problem (include personal factors and/or comorbidities that impact the POC) 87 year old female s/p removal of L hip hardware (from previous L hip fx repair) and L MONA.    Precautions/Limitations fall precautions;left hip precautions   Weight-Bearing Status - LLE weight-bearing as tolerated   General Info Comments Posterior or posterior-lateral hip precautions   Cognitive Status Examination   Orientation orientation to person, place and time   Level of Consciousness alert;confused   Follows Commands and Answers Questions able to follow single-step instructions   Pain Assessment   Patient Currently in Pain No  (Denies at rest, 8/10 with  "moving)   Integumentary/Edema   Integumentary/Edema Comments Dressing clean, dry and intact. +hemovac.    Range of Motion (ROM)   ROM Comment Decreased L LE AROM secondary to pain, weakness   Strength   Strength Comments Needs AAROM with L LE exercises and physical assist with mobility   Bed Mobility   Bed Mobility Comments Trialed supine to sit with min A x 2, not able to complete.    Sensory Examination   Sensory Perception Comments Denies burning, numbness and tingling   Modality Interventions   Planned Modality Interventions Cryotherapy   Planned Modality Interventions Comments PRN   General Therapy Interventions   Planned Therapy Interventions bed mobility training;gait training;ROM;strengthening;transfer training;home program guidelines;progressive activity/exercise   Clinical Impression   Criteria for Skilled Therapeutic Intervention yes, treatment indicated   PT Diagnosis Impaired gait   Influenced by the following impairments Pain, decreased L LE AROM/strength, impaired balance   Functional limitations due to impairments Decreased IND with functional mobility   Clinical Presentation Evolving/Changing   Clinical Presentation Rationale High fluctuating levels of pain   Clinical Decision Making (Complexity) Low complexity   Therapy Frequency` 2 times/day   Predicted Duration of Therapy Intervention (days/wks) 3 days   Anticipated Discharge Disposition Transitional Care Facility   Risk & Benefits of therapy have been explained Yes   Patient, Family & other staff in agreement with plan of care Yes   [facility] [employer] TM \"6 Clicks\"   2016, Trustees of [facility], under license to [employer].  All rights reserved.   6 Clicks Short Forms Basic Mobility Inpatient Short Form   [facility] AM-PAC  \"6 Clicks\" V.2 Basic Mobility Inpatient Short Form   1. Turning from your back to your side while in a flat bed without using bedrails? 2 - A Lot   2. Moving from lying on your back to sitting on the " side of a flat bed without using bedrails? 2 - A Lot   3. Moving to and from a bed to a chair (including a wheelchair)? 1 - Total   4. Standing up from a chair using your arms (e.g., wheelchair, or bedside chair)? 1 - Total   5. To walk in hospital room? 1 - Total   6. Climbing 3-5 steps with a railing? 1 - Total   Basic Mobility Raw Score (Score out of 24.Lower scores equate to lower levels of function) 8   Total Evaluation Time   Total Evaluation Time (Minutes) 10[BB1.1]        Revision History        User Key Date/Time User Provider Type Action    > BB1.1 12/11/2017  4:55 PM Danita Feng PT Physical Therapist Sign            Progress Notes by Thu Zhang at 12/11/2017  8:26 AM     Author:  Thu Zhang Service:  (none) Author Type:  (none)    Filed:  12/11/2017  8:27 AM Date of Service:  12/11/2017  8:26 AM Creation Time:  12/11/2017  8:26 AM    Status:  Signed :  Thu Zhang         Admission medication history interview status for the 12/11/2017  admission is complete. See EPIC admission navigator for prior to admission medications     Medication history source reliability:Moderate    Medication history interview source(s):Patient    Medication history resources (including written lists, pill bottles, clinic record):Patient brought in a med list on day of procedure.    Primary pharmacy.[facility], [facility], [place].    Additional medication history information not noted on PTA med list :None    Time spent in this activity: 30 minutes    Prior to Admission medications    Medication Sig Last Dose Taking? Auth Provider   Acetaminophen (TYLENOL PO) Take 500-1,000 mg by mouth every 8 hours as needed for mild pain or fever 500 mg on 12/10/2017 at 2030 Yes Reported, Patient   ASPIRIN EC PO Take 81 mg by mouth every morning 12/6/2017 at am Yes Reported, Patient   senna-docusate (SENOKOT-S;PERICOLACE) 8.6-50 MG per tablet Take 1-2 tablets by mouth daily as needed for constipation  12/9/2017 at prn Yes Reported, Patient   VITAMIN D, CHOLECALCIFEROL, PO Take 2,000 Units by mouth every morning 12/10/2017 at am Yes Reported, Patient   mupirocin (BACTROBAN) 2 % nasal ointment Apply 1 each into each nare 2 times daily Not started yet. Yes Reported, Patient   BuPROPion HCl (WELLBUTRIN SR PO) Take 150 mg by mouth every morning  12/11/2017 at 0630 Yes Unknown, Entered By History   AMLODIPINE BESYLATE PO Take 5 mg by mouth every morning  12/11/2017 at 0630 Yes Unknown, Entered By History   ATORVASTATIN CALCIUM PO Take 40 mg by mouth daily (with dinner)  12/10/2017 at 1930 Yes Unknown, Entered By History   calcium-vitamin D (CALTRATE) 600-400 MG-UNIT per tablet Take 1 tablet by mouth daily (with dinner)  12/10/2017 at 1930 Yes Unknown, Entered By History   Multiple Vitamin (MULTI-VITAMIN PO) Take 1 tablet by mouth daily (with dinner)  12/10/2017 at 1930 Yes Reported, Patient   citalopram (CELEXA) 40 MG tablet Take 40 mg by mouth every morning  12/11/2017 at 0630 Yes Reported, Patient   oxybutynin (DITROPAN) 5 MG tablet Take 5 mg by mouth 2 times daily. 12/10/2017 at 1930 Yes Reported, Patient   omeprazole (PRILOSEC) 40 MG capsule Take 40 mg by mouth every morning  12/10/2017 at am Yes Reported, Patient   ALENdronate (FOSAMAX) 70 MG tablet Take 70 mg by mouth every 7 days (Sunday) 12/10/2017 at am Yes Reported, Patient[CN1.1]            Revision History        User Key Date/Time User Provider Type Action    > CN1.1 12/11/2017  8:27 AM Thu Zhang (none) Sign                  Procedure Notes     No notes of this type exist for this encounter.         Progress Notes - Therapies (Notes from 12/11/17 through 12/14/17)      Progress Notes by Danita Feng PT at 12/11/2017  4:54 PM     Author:  Danita Feng PT Service:  (none) Author Type:  Physical Therapist    Filed:  12/11/2017  4:55 PM Date of Service:  12/11/2017  4:54 PM Creation Time:  12/11/2017  4:54 PM    Status:  Signed :  Jung  Danita, PT (Physical Therapist)            12/11/17 1600   Quick Adds   Type of Visit Initial PT Evaluation   Living Environment   Lives With alone   Living Arrangements house   Home Accessibility tub/shower is not walk in   Number of Stairs to Enter Home 2  (2 grab bars)   Number of Stairs Within Home 13  (one rail)   Transportation Available family or friend will provide   Living Environment Comment Has a cleaning lady 1x/week. Manages her own medications, uses a pillbox. Per daughter, patient has been going downstairs to do her own laundry recently.    Self-Care   Usual Activity Tolerance good   Current Activity Tolerance poor   Equipment Currently Used at Home tub bench;walker, rolling   Functional Level Prior   Ambulation 1-->assistive equipment  (4WW)   Transferring 1-->assistive equipment   Toileting 1-->assistive equipment  (Tub bench)   Bathing 0-->independent   Dressing 1-->assistive equipment  (Sock aide)   Fall history within last six months no   General Information   Onset of Illness/Injury or Date of Surgery - Date 12/11/17   Referring Physician MD Katherine   Patient/Family Goals Statement To go to rehab   Pertinent History of Current Problem (include personal factors and/or comorbidities that impact the POC) 87 year old female s/p removal of L hip hardware (from previous L hip fx repair) and L MONA.    Precautions/Limitations fall precautions;left hip precautions   Weight-Bearing Status - LLE weight-bearing as tolerated   General Info Comments Posterior or posterior-lateral hip precautions   Cognitive Status Examination   Orientation orientation to person, place and time   Level of Consciousness alert;confused   Follows Commands and Answers Questions able to follow single-step instructions   Pain Assessment   Patient Currently in Pain No  (Denies at rest, 8/10 with moving)   Integumentary/Edema   Integumentary/Edema Comments Dressing clean, dry and intact. +hemovac.    Range of Motion (ROM)   ROM  "Comment Decreased L LE AROM secondary to pain, weakness   Strength   Strength Comments Needs AAROM with L LE exercises and physical assist with mobility   Bed Mobility   Bed Mobility Comments Trialed supine to sit with min A x 2, not able to complete.    Sensory Examination   Sensory Perception Comments Denies burning, numbness and tingling   Modality Interventions   Planned Modality Interventions Cryotherapy   Planned Modality Interventions Comments PRN   General Therapy Interventions   Planned Therapy Interventions bed mobility training;gait training;ROM;strengthening;transfer training;home program guidelines;progressive activity/exercise   Clinical Impression   Criteria for Skilled Therapeutic Intervention yes, treatment indicated   PT Diagnosis Impaired gait   Influenced by the following impairments Pain, decreased L LE AROM/strength, impaired balance   Functional limitations due to impairments Decreased IND with functional mobility   Clinical Presentation Evolving/Changing   Clinical Presentation Rationale High fluctuating levels of pain   Clinical Decision Making (Complexity) Low complexity   Therapy Frequency` 2 times/day   Predicted Duration of Therapy Intervention (days/wks) 3 days   Anticipated Discharge Disposition Transitional Care Facility   Risk & Benefits of therapy have been explained Yes   Patient, Family & other staff in agreement with plan of care Yes   [facility] [employer] TM \"6 Clicks\"   2016, Trustees of [facility], under license to [employer].  All rights reserved.   6 Clicks Short Forms Basic Mobility Inpatient Short Form   [facility] AM-PAC  \"6 Clicks\" V.2 Basic Mobility Inpatient Short Form   1. Turning from your back to your side while in a flat bed without using bedrails? 2 - A Lot   2. Moving from lying on your back to sitting on the side of a flat bed without using bedrails? 2 - A Lot   3. Moving to and from a bed to a chair (including a wheelchair)? 1 - Total   4. " Standing up from a chair using your arms (e.g., wheelchair, or bedside chair)? 1 - Total   5. To walk in hospital room? 1 - Total   6. Climbing 3-5 steps with a railing? 1 - Total   Basic Mobility Raw Score (Score out of 24.Lower scores equate to lower levels of function) 8   Total Evaluation Time   Total Evaluation Time (Minutes) 10[BB1.1]        Revision History        User Key Date/Time User Provider Type Action    > BB1.1 12/11/2017  4:55 PM Danita Feng PT Physical Therapist Sign            Progress Notes by Thu Zhang at 12/11/2017  8:26 AM     Author:  Thu Zhang Service:  (none) Author Type:  (none)    Filed:  12/11/2017  8:27 AM Date of Service:  12/11/2017  8:26 AM Creation Time:  12/11/2017  8:26 AM    Status:  Signed :  Thu Zhang         Admission medication history interview status for the 12/11/2017  admission is complete. See EPIC admission navigator for prior to admission medications     Medication history source reliability:Moderate    Medication history interview source(s):Patient    Medication history resources (including written lists, pill bottles, clinic record):Patient brought in a med list on day of procedure.    Primary pharmacy.[facility], [facility], [place].    Additional medication history information not noted on PTA med list :None    Time spent in this activity: 30 minutes    Prior to Admission medications    Medication Sig Last Dose Taking? Auth Provider   Acetaminophen (TYLENOL PO) Take 500-1,000 mg by mouth every 8 hours as needed for mild pain or fever 500 mg on 12/10/2017 at 2030 Yes Reported, Patient   ASPIRIN EC PO Take 81 mg by mouth every morning 12/6/2017 at am Yes Reported, Patient   senna-docusate (SENOKOT-S;PERICOLACE) 8.6-50 MG per tablet Take 1-2 tablets by mouth daily as needed for constipation 12/9/2017 at prn Yes Reported, Patient   VITAMIN D, CHOLECALCIFEROL, PO Take 2,000 Units by mouth every morning 12/10/2017 at am Yes  Reported, Patient   mupirocin (BACTROBAN) 2 % nasal ointment Apply 1 each into each nare 2 times daily Not started yet. Yes Reported, Patient   BuPROPion HCl (WELLBUTRIN SR PO) Take 150 mg by mouth every morning  12/11/2017 at 0630 Yes Unknown, Entered By History   AMLODIPINE BESYLATE PO Take 5 mg by mouth every morning  12/11/2017 at 0630 Yes Unknown, Entered By History   ATORVASTATIN CALCIUM PO Take 40 mg by mouth daily (with dinner)  12/10/2017 at 1930 Yes Unknown, Entered By History   calcium-vitamin D (CALTRATE) 600-400 MG-UNIT per tablet Take 1 tablet by mouth daily (with dinner)  12/10/2017 at 1930 Yes Unknown, Entered By History   Multiple Vitamin (MULTI-VITAMIN PO) Take 1 tablet by mouth daily (with dinner)  12/10/2017 at 1930 Yes Reported, Patient   citalopram (CELEXA) 40 MG tablet Take 40 mg by mouth every morning  12/11/2017 at 0630 Yes Reported, Patient   oxybutynin (DITROPAN) 5 MG tablet Take 5 mg by mouth 2 times daily. 12/10/2017 at 1930 Yes Reported, Patient   omeprazole (PRILOSEC) 40 MG capsule Take 40 mg by mouth every morning  12/10/2017 at am Yes Reported, Patient   ALENdronate (FOSAMAX) 70 MG tablet Take 70 mg by mouth every 7 days (Sunday) 12/10/2017 at am Yes Reported, Patient[CN1.1]            Revision History        User Key Date/Time User Provider Type Action    > CN1.1 12/11/2017  8:27 AM Thu Zhang (none) Sign

## 2017-12-12 ENCOUNTER — APPOINTMENT (OUTPATIENT)
Dept: PHYSICAL THERAPY | Facility: CLINIC | Age: 82
DRG: 470 | End: 2017-12-12
Attending: ORTHOPAEDIC SURGERY
Payer: MEDICARE

## 2017-12-12 LAB
HGB BLD-MCNC: 7.9 G/DL (ref 11.7–15.7)
HGB BLD-MCNC: 8.2 G/DL (ref 11.7–15.7)

## 2017-12-12 PROCEDURE — A9270 NON-COVERED ITEM OR SERVICE: HCPCS | Mod: GY | Performed by: ORTHOPAEDIC SURGERY

## 2017-12-12 PROCEDURE — 85018 HEMOGLOBIN: CPT | Performed by: PHYSICIAN ASSISTANT

## 2017-12-12 PROCEDURE — 25000125 ZZHC RX 250: Performed by: ORTHOPAEDIC SURGERY

## 2017-12-12 PROCEDURE — 85018 HEMOGLOBIN: CPT | Performed by: ORTHOPAEDIC SURGERY

## 2017-12-12 PROCEDURE — 25000128 H RX IP 250 OP 636: Performed by: ORTHOPAEDIC SURGERY

## 2017-12-12 PROCEDURE — 36415 COLL VENOUS BLD VENIPUNCTURE: CPT | Performed by: PHYSICIAN ASSISTANT

## 2017-12-12 PROCEDURE — A9270 NON-COVERED ITEM OR SERVICE: HCPCS | Mod: GY | Performed by: PHYSICIAN ASSISTANT

## 2017-12-12 PROCEDURE — 36415 COLL VENOUS BLD VENIPUNCTURE: CPT | Performed by: ORTHOPAEDIC SURGERY

## 2017-12-12 PROCEDURE — 25000132 ZZH RX MED GY IP 250 OP 250 PS 637: Mod: GY | Performed by: PHYSICIAN ASSISTANT

## 2017-12-12 PROCEDURE — 97530 THERAPEUTIC ACTIVITIES: CPT | Mod: GP

## 2017-12-12 PROCEDURE — 12000007 ZZH R&B INTERMEDIATE

## 2017-12-12 PROCEDURE — 40000193 ZZH STATISTIC PT WARD VISIT

## 2017-12-12 PROCEDURE — 25000132 ZZH RX MED GY IP 250 OP 250 PS 637: Mod: GY | Performed by: ORTHOPAEDIC SURGERY

## 2017-12-12 PROCEDURE — 97110 THERAPEUTIC EXERCISES: CPT | Mod: GP

## 2017-12-12 PROCEDURE — 25000128 H RX IP 250 OP 636: Performed by: PHYSICIAN ASSISTANT

## 2017-12-12 RX ORDER — OXYCODONE HYDROCHLORIDE 5 MG/1
5-10 TABLET ORAL
Status: DISCONTINUED | OUTPATIENT
Start: 2017-12-12 | End: 2017-12-14 | Stop reason: HOSPADM

## 2017-12-12 RX ORDER — HYDROMORPHONE HYDROCHLORIDE 1 MG/ML
.3-.5 INJECTION, SOLUTION INTRAMUSCULAR; INTRAVENOUS; SUBCUTANEOUS
Status: DISCONTINUED | OUTPATIENT
Start: 2017-12-12 | End: 2017-12-14 | Stop reason: HOSPADM

## 2017-12-12 RX ORDER — CALCIUM CARBONATE 500 MG/1
500-1000 TABLET, CHEWABLE ORAL
Status: DISCONTINUED | OUTPATIENT
Start: 2017-12-12 | End: 2017-12-14 | Stop reason: HOSPADM

## 2017-12-12 RX ADMIN — OXYCODONE HYDROCHLORIDE 10 MG: 5 TABLET ORAL at 14:51

## 2017-12-12 RX ADMIN — VITAMIN D, TAB 1000IU (100/BT) 2000 UNITS: 25 TAB at 09:04

## 2017-12-12 RX ADMIN — ASPIRIN 81 MG: 81 TABLET, COATED ORAL at 09:03

## 2017-12-12 RX ADMIN — ACETAMINOPHEN 975 MG: 325 TABLET, FILM COATED ORAL at 02:56

## 2017-12-12 RX ADMIN — ATORVASTATIN CALCIUM 40 MG: 40 TABLET, FILM COATED ORAL at 18:55

## 2017-12-12 RX ADMIN — CALCIUM CARBONATE (ANTACID) CHEW TAB 500 MG 1000 MG: 500 CHEW TAB at 00:31

## 2017-12-12 RX ADMIN — CALCIUM CARBONATE (ANTACID) CHEW TAB 500 MG 1000 MG: 500 CHEW TAB at 18:55

## 2017-12-12 RX ADMIN — CALCIUM CARBONATE (ANTACID) CHEW TAB 500 MG 1000 MG: 500 CHEW TAB at 14:40

## 2017-12-12 RX ADMIN — OXYCODONE HYDROCHLORIDE 5 MG: 5 TABLET ORAL at 00:31

## 2017-12-12 RX ADMIN — Medication 1 TABLET: at 18:55

## 2017-12-12 RX ADMIN — ACETAMINOPHEN 975 MG: 325 TABLET, FILM COATED ORAL at 18:55

## 2017-12-12 RX ADMIN — ENOXAPARIN SODIUM 40 MG: 40 INJECTION SUBCUTANEOUS at 09:05

## 2017-12-12 RX ADMIN — CITALOPRAM HYDROBROMIDE 40 MG: 20 TABLET ORAL at 09:04

## 2017-12-12 RX ADMIN — OXYCODONE HYDROCHLORIDE 10 MG: 5 TABLET ORAL at 10:56

## 2017-12-12 RX ADMIN — OMEPRAZOLE 40 MG: 20 CAPSULE, DELAYED RELEASE ORAL at 09:04

## 2017-12-12 RX ADMIN — OXYBUTYNIN CHLORIDE 5 MG: 5 TABLET ORAL at 09:04

## 2017-12-12 RX ADMIN — SENNOSIDES AND DOCUSATE SODIUM 2 TABLET: 8.6; 5 TABLET ORAL at 20:36

## 2017-12-12 RX ADMIN — BUPROPION HYDROCHLORIDE 150 MG: 150 TABLET, FILM COATED, EXTENDED RELEASE ORAL at 09:05

## 2017-12-12 RX ADMIN — SODIUM CHLORIDE 1000 ML: 9 INJECTION, SOLUTION INTRAVENOUS at 07:50

## 2017-12-12 RX ADMIN — ACETAMINOPHEN 975 MG: 325 TABLET, FILM COATED ORAL at 09:04

## 2017-12-12 RX ADMIN — SENNOSIDES AND DOCUSATE SODIUM 1 TABLET: 8.6; 5 TABLET ORAL at 09:05

## 2017-12-12 RX ADMIN — OXYBUTYNIN CHLORIDE 5 MG: 5 TABLET ORAL at 20:36

## 2017-12-12 RX ADMIN — AMLODIPINE BESYLATE 5 MG: 5 TABLET ORAL at 09:05

## 2017-12-12 RX ADMIN — CEFAZOLIN SODIUM 1 G: 1 SOLUTION INTRAVENOUS at 04:49

## 2017-12-12 RX ADMIN — OXYCODONE HYDROCHLORIDE 5 MG: 5 TABLET ORAL at 04:17

## 2017-12-12 RX ADMIN — OXYCODONE HYDROCHLORIDE 5 MG: 5 TABLET ORAL at 07:47

## 2017-12-12 RX ADMIN — ONDANSETRON 4 MG: 4 TABLET, ORALLY DISINTEGRATING ORAL at 22:03

## 2017-12-12 NOTE — PLAN OF CARE
Problem: Patient Care Overview  Goal: Plan of Care/Patient Progress Review  Discharge Planner PT   Patient plan for discharge: TCU  Current status: Pt performed supine MONA exercises. Supine <> sit with modA x2, maintained static sitting for 3 minutes. Sit <> stand with modA x1 and FWW, maintained static standing for 1 minute, took 3 small lateral steps for transfer back to bed. Pt reported increased pain with movement and felt nauseous.    Barriers to return to prior living situation: dec activity tolerance, pain, weakness, level of assist needed  Recommendations for discharge: TBD POD2  Rationale for recommendations: TBD POD2       Entered by: Rose Tadeo 12/12/2017 8:43 AM

## 2017-12-12 NOTE — PROGRESS NOTES
[facility]    Orthopedics  Daily Post-Op Note    Assessment & Plan   Procedure(s):  REMOVE HARDWARE HIP NAILING  ARTHROPLASTY HIP   -1 Day Post-Op     Doing well.  No immediate surgical complications identified.    Plan:    -Advance activity as tolerated, ambulate  -Continue supportive and symptomatic treatment  -Start or continue physical therapy  -Pain control measures- increased both Oxycodone and Dilaudid PRN  -Advance diet as tolerated  -Social work consult for discharge planning, 12/14/17  -Low hemoglobin- start with fluid bolus and will recheck this afternoon, per Dr. Katherine Chaudhary, PA-C    Interval History   Stable.  Doing well.  Improving slowly, reports occasional tingling in lower extremities.  Pain is not well tolerated, will increase dosing.  No fevers, chills, numbness, chest pain, sob, calf pain, n/v. -flatus, -bm    Physical Exam   Temp: 98.3  F (36.8  C) Temp src: Axillary BP: 99/49 Pulse: 68 Heart Rate: 82 Resp: 21 SpO2: 95 % O2 Device: Nasal cannula Oxygen Delivery: 3 LPM  Vitals:    12/11/17 0911   Weight: 54.4 kg (120 lb)     Vital Signs with Ranges  Temp:  [96.3  F (35.7  C)-98.4  F (36.9  C)] 98.3  F (36.8  C)  Pulse:  [68-82] 68  Heart Rate:  [67-96] 82  Resp:  [12-22] 21  BP: ()/(48-97) 99/49  SpO2:  [90 %-100 %] 95 %  I/O last 3 completed shifts:  In: 1700 [P.O.:250; I.V.:1200]  Out: 1600 [Urine:975; Drains:375; Blood:250]    Constitutional: NAD, A&O, appears somewhat anxious, laying in bed with abduction pillow in place  ENT: NCAT  Respiratory: unlabored  Musculoskeletal: Dressing is C/D/I. Drain in place. Good motion bilateral lower extremities. Bilateral dorsalis pedis pulses are palpable. Bilateral calves are soft and nontedner  Neurologic: Distal sensation intact to light touch      Medications     dextrose 5% and 0.45% NaCl + KCl 20 mEq/L 125 mL/hr at 12/11/17 2258        amLODIPine (NORVASC) tablet 5 mg  5 mg Oral QAM     aspirin EC EC tablet  81 mg  81 mg Oral QAM     atorvastatin (LIPITOR) tablet 40 mg  40 mg Oral Daily with supper     buPROPion (WELLBUTRIN SR) 12 hr tablet 150 mg  150 mg Oral QAM     calcium-vitamin D  1 tablet Oral Daily with supper     citalopram  40 mg Oral QAM     omeprazole  40 mg Oral QAM     oxybutynin  5 mg Oral BID     cholecalciferol  2,000 Units Oral QAM     sodium chloride (PF)  3 mL Intracatheter Q8H     enoxaparin  40 mg Subcutaneous Q24H     acetaminophen  975 mg Oral Q8H     senna-docusate  1-2 tablet Oral BID       Data   Results for orders placed or performed during the hospital encounter of 12/11/17 (from the past 24 hour(s))   ABO/Rh type and screen   Result Value Ref Range    ABO A     RH(D) Pos     Antibody Screen Neg     Test Valid Only At [facility]        Specimen Expires 12/14/2017    Basic metabolic panel   Result Value Ref Range    Sodium 139 133 - 144 mmol/L    Potassium 3.8 3.4 - 5.3 mmol/L    Chloride 106 94 - 109 mmol/L    Carbon Dioxide 27 20 - 32 mmol/L    Anion Gap 6 3 - 14 mmol/L    Glucose 92 70 - 99 mg/dL    Urea Nitrogen 20 7 - 30 mg/dL    Creatinine 0.93 0.52 - 1.04 mg/dL    GFR Estimate 57 (L) >60 mL/min/1.7m2    GFR Estimate If Black 69 >60 mL/min/1.7m2    Calcium 8.8 8.5 - 10.1 mg/dL   Hemoglobin   Result Value Ref Range    Hemoglobin 9.5 (L) 11.7 - 15.7 g/dL   Platelet count   Result Value Ref Range    Platelet Count 218 150 - 450 10e9/L   XR Pelvis w Hip Port Left 1 View    Narrative    XR PELVIS AND HIP PORTABLE LEFT 1 VIEW 12/11/2017 1:25 PM    HISTORY: Post Op Hip Arthroplasty     COMPARISON: 11/20/2016    FINDINGS: Long stem left hip arthroplasty without radiographic  evidence of complication.      Impression    IMPRESSION: Intact arthroplasty.    MILAN LOVE MD   Hemoglobin   Result Value Ref Range    Hemoglobin 7.9 (L) 11.7 - 15.7 g/dL

## 2017-12-12 NOTE — PLAN OF CARE
Problem: Patient Care Overview  Goal: Plan of Care/Patient Progress Review  VSS dressing cdi, cms intact. Unable to dangle due to pain. LS clear. Oxycodone for pain. Patient is alert and oriented x 3.

## 2017-12-12 NOTE — PLAN OF CARE
Problem: Patient Care Overview  Goal: Plan of Care/Patient Progress Review  Outcome: No Change  A/O x 4.  Hypotensive (99/49) otherwise VSS on 3 LPM NC; O2 sats > 92%; capnography in place.  C/O L hip pain; oxycodone given x 2 with relief.  CMS intact.  Wilson removed 0600; due to void.  Hemovac--125 mL output. L hip--dressing C/D/I.  Hgb--7.9.  Weight bearing as tolerated.  Assist x 2.  POD 1.  Regular diet tolerated.  Denies nausea, vomiting, or SOB.  D/C  pending

## 2017-12-13 ENCOUNTER — APPOINTMENT (OUTPATIENT)
Dept: PHYSICAL THERAPY | Facility: CLINIC | Age: 82
DRG: 470 | End: 2017-12-13
Attending: ORTHOPAEDIC SURGERY
Payer: MEDICARE

## 2017-12-13 LAB
ABO + RH BLD: NORMAL
ABO + RH BLD: NORMAL
BLD GP AB SCN SERPL QL: NORMAL
BLD PROD TYP BPU: NORMAL
BLD PROD TYP BPU: NORMAL
BLD UNIT ID BPU: 0
BLOOD BANK CMNT PATIENT-IMP: NORMAL
BLOOD PRODUCT CODE: NORMAL
BPU ID: NORMAL
HGB BLD-MCNC: 7.1 G/DL (ref 11.7–15.7)
HGB BLD-MCNC: 7.8 G/DL (ref 11.7–15.7)
NUM BPU REQUESTED: 1
SPECIMEN EXP DATE BLD: NORMAL
TRANSFUSION STATUS PATIENT QL: NORMAL
TRANSFUSION STATUS PATIENT QL: NORMAL

## 2017-12-13 PROCEDURE — A9270 NON-COVERED ITEM OR SERVICE: HCPCS | Mod: GY | Performed by: ORTHOPAEDIC SURGERY

## 2017-12-13 PROCEDURE — 97116 GAIT TRAINING THERAPY: CPT | Mod: GP

## 2017-12-13 PROCEDURE — 25000132 ZZH RX MED GY IP 250 OP 250 PS 637: Mod: GY | Performed by: ORTHOPAEDIC SURGERY

## 2017-12-13 PROCEDURE — P9016 RBC LEUKOCYTES REDUCED: HCPCS | Performed by: ANESTHESIOLOGY

## 2017-12-13 PROCEDURE — 97530 THERAPEUTIC ACTIVITIES: CPT | Mod: GP

## 2017-12-13 PROCEDURE — 12000007 ZZH R&B INTERMEDIATE

## 2017-12-13 PROCEDURE — 85018 HEMOGLOBIN: CPT | Performed by: ORTHOPAEDIC SURGERY

## 2017-12-13 PROCEDURE — 25000125 ZZHC RX 250: Performed by: ORTHOPAEDIC SURGERY

## 2017-12-13 PROCEDURE — 40000193 ZZH STATISTIC PT WARD VISIT

## 2017-12-13 PROCEDURE — 25000132 ZZH RX MED GY IP 250 OP 250 PS 637: Mod: GY | Performed by: PHYSICIAN ASSISTANT

## 2017-12-13 PROCEDURE — 25000128 H RX IP 250 OP 636: Performed by: ORTHOPAEDIC SURGERY

## 2017-12-13 PROCEDURE — 36415 COLL VENOUS BLD VENIPUNCTURE: CPT | Performed by: ORTHOPAEDIC SURGERY

## 2017-12-13 PROCEDURE — A9270 NON-COVERED ITEM OR SERVICE: HCPCS | Mod: GY | Performed by: PHYSICIAN ASSISTANT

## 2017-12-13 PROCEDURE — 97110 THERAPEUTIC EXERCISES: CPT | Mod: GP

## 2017-12-13 PROCEDURE — 25800025 ZZH RX 258: Performed by: ORTHOPAEDIC SURGERY

## 2017-12-13 RX ORDER — DIPHENHYDRAMINE HYDROCHLORIDE 50 MG/ML
50 INJECTION INTRAMUSCULAR; INTRAVENOUS
Status: DISCONTINUED | OUTPATIENT
Start: 2017-12-13 | End: 2017-12-14 | Stop reason: HOSPADM

## 2017-12-13 RX ORDER — AMOXICILLIN 250 MG
1-2 CAPSULE ORAL 2 TIMES DAILY
Qty: 100 TABLET | Refills: 0 | DISCHARGE
Start: 2017-12-13

## 2017-12-13 RX ORDER — ONDANSETRON 4 MG/1
4 TABLET, ORALLY DISINTEGRATING ORAL EVERY 6 HOURS PRN
Qty: 120 TABLET | Refills: 0 | DISCHARGE
Start: 2017-12-13

## 2017-12-13 RX ORDER — METHYLPREDNISOLONE SODIUM SUCCINATE 125 MG/2ML
125 INJECTION, POWDER, LYOPHILIZED, FOR SOLUTION INTRAMUSCULAR; INTRAVENOUS
Status: DISCONTINUED | OUTPATIENT
Start: 2017-12-13 | End: 2017-12-14 | Stop reason: HOSPADM

## 2017-12-13 RX ORDER — OXYCODONE HYDROCHLORIDE 5 MG/1
5-10 TABLET ORAL
Qty: 60 TABLET | Refills: 0 | Status: ON HOLD | DISCHARGE
Start: 2017-12-13 | End: 2019-03-18

## 2017-12-13 RX ADMIN — OXYCODONE HYDROCHLORIDE 5 MG: 5 TABLET ORAL at 15:05

## 2017-12-13 RX ADMIN — POTASSIUM CHLORIDE, DEXTROSE MONOHYDRATE AND SODIUM CHLORIDE: 150; 5; 450 INJECTION, SOLUTION INTRAVENOUS at 02:01

## 2017-12-13 RX ADMIN — BUPROPION HYDROCHLORIDE 150 MG: 150 TABLET, FILM COATED, EXTENDED RELEASE ORAL at 08:02

## 2017-12-13 RX ADMIN — OMEPRAZOLE 40 MG: 20 CAPSULE, DELAYED RELEASE ORAL at 08:01

## 2017-12-13 RX ADMIN — ACETAMINOPHEN 975 MG: 325 TABLET, FILM COATED ORAL at 18:09

## 2017-12-13 RX ADMIN — Medication 1 TABLET: at 18:10

## 2017-12-13 RX ADMIN — OXYBUTYNIN CHLORIDE 5 MG: 5 TABLET ORAL at 08:02

## 2017-12-13 RX ADMIN — ATORVASTATIN CALCIUM 40 MG: 40 TABLET, FILM COATED ORAL at 18:09

## 2017-12-13 RX ADMIN — ACETAMINOPHEN 975 MG: 325 TABLET, FILM COATED ORAL at 02:01

## 2017-12-13 RX ADMIN — CITALOPRAM HYDROBROMIDE 40 MG: 20 TABLET ORAL at 08:01

## 2017-12-13 RX ADMIN — ASPIRIN 81 MG: 81 TABLET, COATED ORAL at 08:02

## 2017-12-13 RX ADMIN — AMLODIPINE BESYLATE 5 MG: 5 TABLET ORAL at 08:01

## 2017-12-13 RX ADMIN — VITAMIN D, TAB 1000IU (100/BT) 2000 UNITS: 25 TAB at 08:02

## 2017-12-13 RX ADMIN — ONDANSETRON 4 MG: 4 TABLET, ORALLY DISINTEGRATING ORAL at 05:10

## 2017-12-13 RX ADMIN — SENNOSIDES AND DOCUSATE SODIUM 2 TABLET: 8.6; 5 TABLET ORAL at 08:01

## 2017-12-13 RX ADMIN — OXYCODONE HYDROCHLORIDE 5 MG: 5 TABLET ORAL at 08:02

## 2017-12-13 RX ADMIN — OXYBUTYNIN CHLORIDE 5 MG: 5 TABLET ORAL at 20:49

## 2017-12-13 RX ADMIN — IRON SUCROSE 200 MG: 20 INJECTION, SOLUTION INTRAVENOUS at 11:54

## 2017-12-13 RX ADMIN — OXYCODONE HYDROCHLORIDE 5 MG: 5 TABLET ORAL at 18:09

## 2017-12-13 RX ADMIN — OXYCODONE HYDROCHLORIDE 5 MG: 5 TABLET ORAL at 05:09

## 2017-12-13 RX ADMIN — SENNOSIDES AND DOCUSATE SODIUM 2 TABLET: 8.6; 5 TABLET ORAL at 20:49

## 2017-12-13 RX ADMIN — ACETAMINOPHEN 975 MG: 325 TABLET, FILM COATED ORAL at 10:21

## 2017-12-13 RX ADMIN — ENOXAPARIN SODIUM 40 MG: 40 INJECTION SUBCUTANEOUS at 10:21

## 2017-12-13 NOTE — DISCHARGE SUMMARY
[facility]    Discharge Summary  Orthopedics    Date of Admission:  12/11/2017  Date of Discharge:  12/14/17  Discharging Provider: Soraida Chaudhary PA-C    Discharge Diagnoses   Patient Active Problem List   Diagnosis     Femur fracture, left (H)     Closed right hip fracture (H)     Hip joint replacement status       Procedure/Surgery Information   Procedure: Procedure(s):  LEFT REMOVAL OF HIP HARDWARE  AND LEFT TOTAL HIP ARTHROPLASTY  - Wound Class: I-Clean   - Wound Class: I-Clean   Surgeon(s): Surgeon(s) and Role:     * Rj Murphy MD - Primary     * Soraida Chaudhary PA-C - Assisting   Specimens: * No specimens in log *   Non-operative procedures None performed     History of Present Illness   Parvin Wen is a 87 year old female who presented with advanced osteoarthritis of the left hip, s/p hip fracture with internal fixation 11/2016    Hospital Course   Parvin Wen was admitted on 12/11/2017.  The following problems were addressed during her hospitalization:    Summary: s/p removal of hardware and left total hip arthroplasty    Post-operative pain control: included Hydromorphone (dilaudid) IV and will be Oxycodone on discharge.     Medications discontinued or adjusted during this hospitalization: New narcotics initiated: Oxycodone     Antibiotics prescribed at discharge: None prescribed     Imaging study follow up needs:   -None performed    Soraida Chaudhary PA-C    Discharge Disposition   Discharged to TCU   Condition at discharge: Stable    Pending Results   Final pathology results: No pathology submitted    Unresulted Labs Ordered in the Past 30 Days of this Admission     No orders found from 10/12/2017 to 12/12/2017.          Primary Care Physician   Jac Roldan    Consultations This Hospital Stay   OCCUPATIONAL THERAPY ADULT IP CONSULT  PHYSICAL THERAPY ADULT IP CONSULT  SOCIAL WORK IP CONSULT  PHYSICAL THERAPY ADULT IP CONSULT  OCCUPATIONAL  THERAPY ADULT IP CONSULT    Discharge Orders     AntiEmbolism Stockings   Bilateral below knee length.On in the morning, off at night     General info for SNF   Length of Stay Estimate: Short Term Care: Estimated # of Days <30  Condition at Discharge: Improving  Level of care:skilled   Rehabilitation Potential: Good  Admission H&P remains valid and up-to-date: Yes  Recent Chemotherapy: N/A  Use Nursing Home Standing Orders: Yes     Mantoux instructions   Give two-step Mantoux (PPD) Per Facility Policy Yes     Reason for your hospital stay   S/p left total hip arthroplasty  Antibiotics  Therapy  Pain control     Bladder scan   X 2 for post void residual as needed     Wound care (specify)   Site:   Left hip  Instructions:  Daily dressing changes/as needed. Keep incision clean, dry, intact     Follow Up and recommended labs and tests   Follow up with Dr. Murphy or Soraida Chaudhary PA-C in 2 weeks.  No follow up labs or test are needed.     Activity - Up with nursing assistance     Weight bearing status   WBAT     Full Code     Physical Therapy Adult Consult   Evaluate and treat as clinically indicated. Gait training and strengthening    Reason:  S/p left total hip arthroplasty     Occupational Therapy Adult Consult   Evaluate and treat as clinically indicated.    Reason:  S/p left total hip arthroplasty     Fall precautions     Advance Diet as Tolerated   Follow this diet upon discharge: Orders Placed This Encounter     Room Service     Advance Diet as Tolerated: Regular Diet Adult       Discharge Medications   Current Discharge Medication List      START taking these medications    Details   oxyCODONE IR (ROXICODONE) 5 MG tablet Take 1-2 tablets (5-10 mg) by mouth every 3 hours as needed for moderate to severe pain  Qty: 60 tablet, Refills: 0    Associated Diagnoses: Status post left hip replacement      enoxaparin (LOVENOX) 40 MG/0.4ML injection Inject 0.4 mLs (40 mg) Subcutaneous every 24 hours for 10 days     "Associated Diagnoses: Status post left hip replacement      ondansetron (ZOFRAN-ODT) 4 MG ODT tab Take 1 tablet (4 mg) by mouth every 6 hours as needed for nausea or vomiting  Qty: 120 tablet, Refills: 0    Associated Diagnoses: Status post left hip replacement         CONTINUE these medications which have CHANGED    Details   senna-docusate (SENOKOT-S;PERICOLACE) 8.6-50 MG per tablet Take 1-2 tablets by mouth 2 times daily  Qty: 100 tablet, Refills: 0    Associated Diagnoses: Status post left hip replacement         CONTINUE these medications which have NOT CHANGED    Details   Acetaminophen (TYLENOL PO) Take 500-1,000 mg by mouth every 8 hours as needed for mild pain or fever      ASPIRIN EC PO Take 81 mg by mouth every morning      VITAMIN D, CHOLECALCIFEROL, PO Take 2,000 Units by mouth every morning      BuPROPion HCl (WELLBUTRIN SR PO) Take 150 mg by mouth every morning       AMLODIPINE BESYLATE PO Take 5 mg by mouth every morning       ATORVASTATIN CALCIUM PO Take 40 mg by mouth daily (with dinner)       calcium-vitamin D (CALTRATE) 600-400 MG-UNIT per tablet Take 1 tablet by mouth daily (with dinner)       Multiple Vitamin (MULTI-VITAMIN PO) Take 1 tablet by mouth daily (with dinner)       citalopram (CELEXA) 40 MG tablet Take 40 mg by mouth every morning       oxybutynin (DITROPAN) 5 MG tablet Take 5 mg by mouth 2 times daily.      omeprazole (PRILOSEC) 40 MG capsule Take 40 mg by mouth every morning       ALENdronate (FOSAMAX) 70 MG tablet Take 70 mg by mouth every 7 days (Sunday)         STOP taking these medications       mupirocin (BACTROBAN) 2 % nasal ointment Comments:   Reason for Stopping:             Allergies   Allergies   Allergen Reactions     Propofol      \"I got stiff as a board\"     Data   Results for orders placed or performed during the hospital encounter of 12/11/17   XR Pelvis w Hip Port Left 1 View    Narrative    XR PELVIS AND HIP PORTABLE LEFT 1 VIEW 12/11/2017 1:25 PM    HISTORY: Post " Op Hip Arthroplasty     COMPARISON: 11/20/2016    FINDINGS: Long stem left hip arthroplasty without radiographic  evidence of complication.      Impression    IMPRESSION: Intact arthroplasty.    MILAN LOVE MD

## 2017-12-13 NOTE — PROGRESS NOTES
Care Transition Initial Assessment - AFTAB  Reason For Consult: discharge planning  Met with: Patient    Active Problems:    Hip joint replacement status         DATA  Lives With: alone  Living Arrangements: house    Identified issues/concerns regarding health management: SW was consulted for discharge planning. Patient is Parvin, an 87 year-old female who was admitted on 12/11 for a hip joint replacement. Her tentative discharge date is 12/14. AFTAB met with patient, and reviewed medical record. Prior to this admission, patient lived alone in a house with 2 stairs to enter and 13 stairs within. She has cleaning assistance once per week. She used a tub bench and a rolling walker. She was otherwise independent with ADLs. Per MD, the recommendation is TCU. Patient requested a room at [facility]. She did not have a preference on private or semi-private. SW placed this referral via DOD. She would like w/c ride set up at discharge.        Transportation Available: family or friend will provide      ASSESSMENT  Cognitive Status:  awake and alert  Concerns to be addressed: Discharge planning.     PLAN  Financial costs for the patient includes Transportation costs.  Patient given options and choices for discharge TCU choices.  Patient/family is agreeable to the plan?  Yes  Patient Goals and Preferences: Discharge to [place].  Patient anticipates discharging to:  TCU.    ANIL Slater, LGSW

## 2017-12-13 NOTE — PLAN OF CARE
Problem: Patient Care Overview  Goal: Plan of Care/Patient Progress Review  Outcome: No Change  A/O x 4.  VSS on 2 LPM NC; O2 sats > 92%.  C/O L hip pain; oxycodone given x 1 with relief; zofran given x 1.  CMS intact.  Due to void; bladder scanned for 500 mL; Straight cath'd for 900 mL at 3 AM.  Patient refused to use bedpan or attempt bedside commode; stated they were scared of the pain; educated pt and encouraged to attempt to get up after pain medication this AM.  Assist x 2; weight bearing as tolerated.   Hemovac--100 mL output. L hip--dressing C/D/I.  POD 2 today.  Regular diet poorly tolerated; refused to eat due to experience of emesis/nausea on previous shift.  Denies nausea, vomiting, or SOB.  D/C  pending clinical progress.

## 2017-12-13 NOTE — PROGRESS NOTES
Parvin Aguilar Behzad  2017  POD #2    Doing well.  Pain well-controlled.    She is feeling much better today.    Temperatures:  Current - Temp: 98.8  F (37.1  C); Max - Temp  Av.1  F (37.3  C)  Min: 98.2  F (36.8  C)  Max: 100.2  F (37.9  C)  Pulse range: Pulse  Av  Min: 83  Max: 83  Blood pressure range: Systolic (24hrs), Av , Min:104 , Max:136   ; Diastolic (24hrs), Av, Min:50, Max:57    CMS: intact  Labs:  Hemoglobin   Date Value Ref Range Status   2017 7.8 (L) 11.7 - 15.7 g/dL Final   ]      PLAN:  Continue physical therapy  Discharge plan: Skilled Nursing Facility tomorrow  IV iron

## 2017-12-13 NOTE — PLAN OF CARE
Problem: Hip Arthroplasty (Total, Partial) (Adult)  Goal: Signs and Symptoms of Listed Potential Problems Will be Absent, Minimized or Managed (Hip Arthroplasty)  Signs and symptoms of listed potential problems will be absent, minimized or managed by discharge/transition of care (reference Hip Arthroplasty (Total, Partial) (Adult) CPG).   Outcome: No Change  A/O x4. Up Ax2 strong assist GB walker. VSS on RA. Tmax 100.6. Dec with IS. CMS intact. Drsg C/D/I. C patent. DTV. 1x emesis. C/o nausea. zofran given. Pain controlled with tylenol and oxycodone. Will cont to monitor.

## 2017-12-13 NOTE — PLAN OF CARE
Problem: Patient Care Overview  Goal: Plan of Care/Patient Progress Review  Discharge Planner PT   Patient plan for discharge: TCU  Current status: Pt performed supine MONA exercises. Pt transferred supine <> sit with Pepper x1, sit <> stand with Pepper x1. Pt ambulated 4 steps with Pepper x2, Difficulty with putting weight through UE, reported shoulder pain and fatigue.   PM session: Pt ambulated 20' with Pepper x2 and FWW, required 1 standing rest break.   Barriers to return to prior living situation: weakness, dec activity tolerance, pain, level of assist needed  Recommendations for discharge: TCU  Rationale for recommendations: Pt would benefit from continued skilled PT services to improve independence and safety with mobility.        Entered by: Rose Tadeo 12/13/2017 9:40 AM

## 2017-12-13 NOTE — PLAN OF CARE
Problem: Patient Care Overview  Goal: Plan of Care/Patient Progress Review  Outcome: Improving  A&O x4 VSS on RA CMS intact Dressing C/D/I Up with A2 Voiding per BSC Taking oxycodone for pain Progressing per plan of care. AM Hgb 7.8, iron infused this shift, recheck scheduled for 1700.

## 2017-12-13 NOTE — PLAN OF CARE
Problem: Hip Arthroplasty (Total, Partial) (Adult)  Goal: Signs and Symptoms of Listed Potential Problems Will be Absent, Minimized or Managed (Hip Arthroplasty)  Signs and symptoms of listed potential problems will be absent, minimized or managed by discharge/transition of care (reference Hip Arthroplasty (Total, Partial) (Adult) CPG).   Outcome: Improving  Pt. A&o, vss, up with assist of 2 and walker, taking oxycodone for pain, unable to void and bladder scanned for 398 cc and straight cat for 500 cc, Pt, claimed that she lost her upper denture with emesis which the aide on duty flushed it in the toilet by accident,  while the writer was on meeting.  charge nurse and manager aware, Will continue following up on the situation.

## 2017-12-13 NOTE — PROGRESS NOTES
"Care Coordination:    Dr. Alejandra stopped at desk, recommending TCU for this patient.  \"Not doing well enough for home,\" and family unwilling/unable to provide the assistance pt needs.  SW consult already in place.      Nel Perez RN, BSN  [facility] Care Coordinator   Mobile Phone: [phone]    "

## 2017-12-14 ENCOUNTER — APPOINTMENT (OUTPATIENT)
Dept: PHYSICAL THERAPY | Facility: CLINIC | Age: 82
DRG: 470 | End: 2017-12-14
Attending: ORTHOPAEDIC SURGERY
Payer: MEDICARE

## 2017-12-14 VITALS
HEART RATE: 71 BPM | HEIGHT: 59 IN | DIASTOLIC BLOOD PRESSURE: 56 MMHG | RESPIRATION RATE: 18 BRPM | WEIGHT: 120 LBS | OXYGEN SATURATION: 92 % | BODY MASS INDEX: 24.19 KG/M2 | TEMPERATURE: 98.1 F | SYSTOLIC BLOOD PRESSURE: 112 MMHG

## 2017-12-14 LAB
CREAT SERPL-MCNC: 0.9 MG/DL (ref 0.52–1.04)
GFR SERPL CREATININE-BSD FRML MDRD: 59 ML/MIN/1.7M2
HGB BLD-MCNC: 8.5 G/DL (ref 11.7–15.7)
PLATELET # BLD AUTO: 156 10E9/L (ref 150–450)

## 2017-12-14 PROCEDURE — 36415 COLL VENOUS BLD VENIPUNCTURE: CPT | Performed by: ORTHOPAEDIC SURGERY

## 2017-12-14 PROCEDURE — A9270 NON-COVERED ITEM OR SERVICE: HCPCS | Mod: GY | Performed by: ORTHOPAEDIC SURGERY

## 2017-12-14 PROCEDURE — 25000132 ZZH RX MED GY IP 250 OP 250 PS 637: Mod: GY | Performed by: ORTHOPAEDIC SURGERY

## 2017-12-14 PROCEDURE — 85018 HEMOGLOBIN: CPT | Performed by: ORTHOPAEDIC SURGERY

## 2017-12-14 PROCEDURE — 25000132 ZZH RX MED GY IP 250 OP 250 PS 637: Mod: GY | Performed by: PHYSICIAN ASSISTANT

## 2017-12-14 PROCEDURE — 97116 GAIT TRAINING THERAPY: CPT | Mod: GP

## 2017-12-14 PROCEDURE — 40000193 ZZH STATISTIC PT WARD VISIT

## 2017-12-14 PROCEDURE — 82565 ASSAY OF CREATININE: CPT | Performed by: ORTHOPAEDIC SURGERY

## 2017-12-14 PROCEDURE — 25000128 H RX IP 250 OP 636: Performed by: ORTHOPAEDIC SURGERY

## 2017-12-14 PROCEDURE — 97110 THERAPEUTIC EXERCISES: CPT | Mod: GP

## 2017-12-14 PROCEDURE — 85049 AUTOMATED PLATELET COUNT: CPT | Performed by: ORTHOPAEDIC SURGERY

## 2017-12-14 PROCEDURE — A9270 NON-COVERED ITEM OR SERVICE: HCPCS | Mod: GY | Performed by: PHYSICIAN ASSISTANT

## 2017-12-14 RX ADMIN — OXYCODONE HYDROCHLORIDE 5 MG: 5 TABLET ORAL at 08:07

## 2017-12-14 RX ADMIN — Medication 1 TABLET: at 16:04

## 2017-12-14 RX ADMIN — OMEPRAZOLE 40 MG: 20 CAPSULE, DELAYED RELEASE ORAL at 08:06

## 2017-12-14 RX ADMIN — ACETAMINOPHEN 975 MG: 325 TABLET, FILM COATED ORAL at 02:07

## 2017-12-14 RX ADMIN — CITALOPRAM HYDROBROMIDE 40 MG: 20 TABLET ORAL at 08:06

## 2017-12-14 RX ADMIN — AMLODIPINE BESYLATE 5 MG: 5 TABLET ORAL at 08:05

## 2017-12-14 RX ADMIN — OXYBUTYNIN CHLORIDE 5 MG: 5 TABLET ORAL at 08:06

## 2017-12-14 RX ADMIN — ACETAMINOPHEN 650 MG: 325 TABLET, FILM COATED ORAL at 16:04

## 2017-12-14 RX ADMIN — SENNOSIDES AND DOCUSATE SODIUM 2 TABLET: 8.6; 5 TABLET ORAL at 08:06

## 2017-12-14 RX ADMIN — IRON SUCROSE 200 MG: 20 INJECTION, SOLUTION INTRAVENOUS at 10:26

## 2017-12-14 RX ADMIN — ENOXAPARIN SODIUM 40 MG: 40 INJECTION SUBCUTANEOUS at 10:25

## 2017-12-14 RX ADMIN — ASPIRIN 81 MG: 81 TABLET, COATED ORAL at 08:07

## 2017-12-14 RX ADMIN — OXYCODONE HYDROCHLORIDE 5 MG: 5 TABLET ORAL at 14:54

## 2017-12-14 RX ADMIN — BUPROPION HYDROCHLORIDE 150 MG: 150 TABLET, FILM COATED, EXTENDED RELEASE ORAL at 08:06

## 2017-12-14 RX ADMIN — ATORVASTATIN CALCIUM 40 MG: 40 TABLET, FILM COATED ORAL at 16:04

## 2017-12-14 RX ADMIN — ACETAMINOPHEN 975 MG: 325 TABLET, FILM COATED ORAL at 10:25

## 2017-12-14 RX ADMIN — VITAMIN D, TAB 1000IU (100/BT) 2000 UNITS: 25 TAB at 08:06

## 2017-12-14 NOTE — PLAN OF CARE
Problem: Patient Care Overview  Goal: Plan of Care/Patient Progress Review  Pain controlled w/ Oxycodone and Tylenol. Up w/ assist of 1, walker and gait belt. Tolerating diet. Voiding per bedside commode. Blood transfusing, tolerating well. Plan to DC to TCU tomorrow. Continue to monitor.

## 2017-12-14 NOTE — PROGRESS NOTES
[facility]    Orthopedics  Daily Post-Op Note    Assessment & Plan   Procedure(s):  REMOVE HARDWARE HIP NAILING  ARTHROPLASTY HIP   -3 Days Post-Op     Doing well.  Clean wound without signs of infection.  Pain well-controlled.    Plan:    -Advance activity as tolerated  -Continue supportive and symptomatic treatment  -Continue physical therapy, ambulate  -Pain control measures  -Advance diet as tolerated  -Recheck hgb this am  -Dressing changed  -Plan for discharge to TCU today pending hgb    Soraida Chaudhary PA-C    Interval History   Stable.  Doing well, dizziness yesterday but states that is resolved this morning.  Improving slowly.  Pain is well tolerated with current regimen.  No fevers, chills, tingling, numbness, chest pain, sob, n/v, calf pain.    Physical Exam   Temp: 95.4  F (35.2  C) Temp src: Oral BP: 117/59 Pulse: 62 Heart Rate: 64 Resp: 16 SpO2: 97 % O2 Device: Nasal cannula Oxygen Delivery: 1 LPM  Vitals:    12/11/17 0911   Weight: 54.4 kg (120 lb)     Vital Signs with Ranges  Temp:  [95.4  F (35.2  C)-98.8  F (37.1  C)] 95.4  F (35.2  C)  Pulse:  [60-69] 62  Heart Rate:  [64-82] 64  Resp:  [16-17] 16  BP: ()/(44-59) 117/59  SpO2:  [92 %-98 %] 97 %  I/O last 3 completed shifts:  In: 780 [P.O.:780]  Out: 850 [Urine:850]    Constitutional: NAD, A&O  ENT: NCAT  Respiratory: unlabored  Skin: incision over the posterior aspect of left hip is well approximated with no erythema. Serosang drainage noted at proximal incision and at drain holes  Musculoskeletal: Dressing changed. Good motion bilateral lower extremities. Bilateral dorsalis pedis pulses are palpable and symmetric, bilateral calves soft and nontender  Neurologic: Distal sensation intact to light touch      Medications     dextrose 5% and 0.45% NaCl + KCl 20 mEq/L 125 mL/hr at 12/13/17 0201        iron sucrose (VENOFER) intermittent infusion  200 mg Intravenous Q24H     amLODIPine (NORVASC) tablet 5 mg  5 mg Oral QAM      aspirin EC EC tablet 81 mg  81 mg Oral QAM     atorvastatin (LIPITOR) tablet 40 mg  40 mg Oral Daily with supper     buPROPion (WELLBUTRIN SR) 12 hr tablet 150 mg  150 mg Oral QAM     calcium-vitamin D  1 tablet Oral Daily with supper     citalopram  40 mg Oral QAM     omeprazole  40 mg Oral QAM     oxybutynin  5 mg Oral BID     cholecalciferol  2,000 Units Oral QAM     sodium chloride (PF)  3 mL Intracatheter Q8H     enoxaparin  40 mg Subcutaneous Q24H     acetaminophen  975 mg Oral Q8H     senna-docusate  1-2 tablet Oral BID       Data   Results for orders placed or performed during the hospital encounter of 12/11/17 (from the past 24 hour(s))   Hemoglobin   Result Value Ref Range    Hemoglobin 7.8 (L) 11.7 - 15.7 g/dL   Hemoglobin   Result Value Ref Range    Hemoglobin 7.1 (L) 11.7 - 15.7 g/dL   Platelet count   Result Value Ref Range    Platelet Count 156 150 - 450 10e9/L   Hemoglobin   Result Value Ref Range    Hemoglobin 8.5 (L) 11.7 - 15.7 g/dL

## 2017-12-14 NOTE — PLAN OF CARE
Problem: Patient Care Overview  Goal: Plan of Care/Patient Progress Review  Outcome: Improving  A/Ox4. VSS. Pt up with one assist to BSC.  Pt is voiding adequately. Dressing changed today. Pain managed with oral pain meds. Pt tolerated Iron infusion. Plan discharge at 1700.

## 2017-12-14 NOTE — PLAN OF CARE
Problem: Patient Care Overview  Goal: Plan of Care/Patient Progress Review  Outcome: Improving  Pt received I unit of blood last, night,  hgb will be recheck this am,  stable vitals, BSC, dressing weeping and changed, possible discharge today to TCU. up with assist of 1,Continue to monitor.

## 2017-12-14 NOTE — PROGRESS NOTES
PAS-RR    D: Per DHS regulation, SW completed and submitted PAS-RR to [place] [employer] Direct Connect via the Senior LinkAge Line.  PAS-RR confirmation # is : [id number]    I: SW spoke with patient and family and they are aware a PAS-RR has been submitted.  SW reviewed with patient and family that they may be contacted for a follow up appointment within 10 days of hospital discharge if their SNF stay is < 30 days.  Contact information for Senior LinkAge Line was also provided.    A: patient and family verbalized understanding.    P: Further questions may be directed to Senior LinkAge Line at #[phone], option #4 for PAS-RR staff.

## 2017-12-14 NOTE — PLAN OF CARE
Problem: Patient Care Overview  Goal: Plan of Care/Patient Progress Review  Patient plan for discharge: TCU  Current status: Supine to/from sit with min assist and much effort from pt. Sit to/from stand with FWW and CGA. Amb 40 ft x 1 with FWW and CGA. Pt reports UE fatigue and headache pain as limiting factors. Pt returned supine at end of session with all needs in reach and bed alarm on. Tolerates MONA exs moderately due to pain.  Barriers to return to prior living situation: weakness, dec activity tolerance, pain, level of assist needed, lives alone  Recommendations for discharge: TCU per the plan established by the Physical Therapist   Rationale for recommendations: Pt would benefit from continued skilled PT services to improve independence and safety with mobility.     Pt discharging to TCU today.    PT goals partially met.

## 2017-12-14 NOTE — PROGRESS NOTES
AFTAB    D: Discharge orders received and faxed to [facility]. Facility has bed availability for today. Patient will be transported via "[employer] at 1700. Patient and family are in agreement.     PAS-RR    D: Per DHS regulation, SW completed and submitted PAS-RR to [place] [employer] Direct Connect via the Senior LinkAge Line.  PAS-RR confirmation # is : [id number]    I: SW spoke with patient and family and they are aware a PAS-RR has been submitted.  AFTAB reviewed with patient and family that they may be contacted for a follow up appointment within 10 days of hospital discharge if their SNF stay is < 30 days.  Contact information for Senior LinkAge Line was also provided.    A: patient and family verbalized understanding.    P: Further questions may be directed to [facility] LinkAge Line at #[phone], option #4 for PAS-RR staff.

## 2018-06-30 NOTE — PROGRESS NOTES
Discharge plans reviewed w/ pt.  No further questions at this time.  Up w/ assist of 1 & walker. Pain minimal.  Discharged to TCU via wheelchair ride.    I will SWITCH the dose or number of times a day I take the medications listed below when I get home from the hospital:  None

## 2019-03-11 ENCOUNTER — APPOINTMENT (OUTPATIENT)
Dept: GENERAL RADIOLOGY | Facility: CLINIC | Age: 84
DRG: 560 | End: 2019-03-11
Attending: EMERGENCY MEDICINE
Payer: MEDICARE

## 2019-03-11 ENCOUNTER — HOSPITAL ENCOUNTER (INPATIENT)
Facility: CLINIC | Age: 84
LOS: 6 days | Discharge: NURSING, REHAB OR LONG-TERM CARE | DRG: 560 | End: 2019-03-18
Attending: EMERGENCY MEDICINE | Admitting: INTERNAL MEDICINE
Payer: MEDICARE

## 2019-03-11 ENCOUNTER — APPOINTMENT (OUTPATIENT)
Dept: CT IMAGING | Facility: CLINIC | Age: 84
DRG: 560 | End: 2019-03-11
Attending: EMERGENCY MEDICINE
Payer: MEDICARE

## 2019-03-11 ENCOUNTER — APPOINTMENT (OUTPATIENT)
Dept: MRI IMAGING | Facility: CLINIC | Age: 84
DRG: 560 | End: 2019-03-11
Attending: EMERGENCY MEDICINE
Payer: MEDICARE

## 2019-03-11 DIAGNOSIS — R78.81 GRAM-POSITIVE BACTEREMIA: Primary | ICD-10-CM

## 2019-03-11 DIAGNOSIS — J18.9 PNEUMONIA OF RIGHT LUNG DUE TO INFECTIOUS ORGANISM, UNSPECIFIED PART OF LUNG: ICD-10-CM

## 2019-03-11 DIAGNOSIS — S32.000A CLOSED COMPRESSION FRACTURE OF LUMBOSACRAL SPINE, INITIAL ENCOUNTER (H): ICD-10-CM

## 2019-03-11 DIAGNOSIS — Z96.642 STATUS POST LEFT HIP REPLACEMENT: ICD-10-CM

## 2019-03-11 DIAGNOSIS — M54.5 LOW BACK PAIN, UNSPECIFIED BACK PAIN LATERALITY, UNSPECIFIED CHRONICITY, WITH SCIATICA PRESENCE UNSPECIFIED: ICD-10-CM

## 2019-03-11 LAB
ALBUMIN SERPL-MCNC: 3.1 G/DL (ref 3.4–5)
ALBUMIN UR-MCNC: 10 MG/DL
ALP SERPL-CCNC: 88 U/L (ref 40–150)
ALT SERPL W P-5'-P-CCNC: 24 U/L (ref 0–50)
ANION GAP SERPL CALCULATED.3IONS-SCNC: 7 MMOL/L (ref 3–14)
APPEARANCE UR: CLEAR
AST SERPL W P-5'-P-CCNC: 23 U/L (ref 0–45)
BASOPHILS # BLD AUTO: 0 10E9/L (ref 0–0.2)
BASOPHILS NFR BLD AUTO: 0.1 %
BILIRUB SERPL-MCNC: 0.5 MG/DL (ref 0.2–1.3)
BILIRUB UR QL STRIP: NEGATIVE
BUN SERPL-MCNC: 18 MG/DL (ref 7–30)
CALCIUM SERPL-MCNC: 8.1 MG/DL (ref 8.5–10.1)
CHLORIDE SERPL-SCNC: 104 MMOL/L (ref 94–109)
CO2 SERPL-SCNC: 24 MMOL/L (ref 20–32)
COLOR UR AUTO: YELLOW
CREAT SERPL-MCNC: 0.87 MG/DL (ref 0.52–1.04)
DIFFERENTIAL METHOD BLD: ABNORMAL
EOSINOPHIL # BLD AUTO: 0 10E9/L (ref 0–0.7)
EOSINOPHIL NFR BLD AUTO: 0.1 %
ERYTHROCYTE [DISTWIDTH] IN BLOOD BY AUTOMATED COUNT: 13.9 % (ref 10–15)
FLUAV+FLUBV AG SPEC QL: NEGATIVE
FLUAV+FLUBV AG SPEC QL: NEGATIVE
GFR SERPL CREATININE-BSD FRML MDRD: 59 ML/MIN/{1.73_M2}
GLUCOSE SERPL-MCNC: 108 MG/DL (ref 70–99)
GLUCOSE UR STRIP-MCNC: NEGATIVE MG/DL
HCT VFR BLD AUTO: 30.2 % (ref 35–47)
HGB BLD-MCNC: 10.1 G/DL (ref 11.7–15.7)
HGB UR QL STRIP: ABNORMAL
IMM GRANULOCYTES # BLD: 0 10E9/L (ref 0–0.4)
IMM GRANULOCYTES NFR BLD: 0.1 %
KETONES UR STRIP-MCNC: 10 MG/DL
LACTATE BLD-SCNC: 0.7 MMOL/L (ref 0.7–2)
LEUKOCYTE ESTERASE UR QL STRIP: NEGATIVE
LIPASE SERPL-CCNC: 177 U/L (ref 73–393)
LYMPHOCYTES # BLD AUTO: 0.7 10E9/L (ref 0.8–5.3)
LYMPHOCYTES NFR BLD AUTO: 7.6 %
MCH RBC QN AUTO: 30.6 PG (ref 26.5–33)
MCHC RBC AUTO-ENTMCNC: 33.4 G/DL (ref 31.5–36.5)
MCV RBC AUTO: 92 FL (ref 78–100)
MONOCYTES # BLD AUTO: 0.6 10E9/L (ref 0–1.3)
MONOCYTES NFR BLD AUTO: 6.4 %
MUCOUS THREADS #/AREA URNS LPF: PRESENT /LPF
NEUTROPHILS # BLD AUTO: 7.3 10E9/L (ref 1.6–8.3)
NEUTROPHILS NFR BLD AUTO: 85.7 %
NITRATE UR QL: NEGATIVE
NRBC # BLD AUTO: 0 10*3/UL
NRBC BLD AUTO-RTO: 0 /100
PH UR STRIP: 7 PH (ref 5–7)
PLATELET # BLD AUTO: 204 10E9/L (ref 150–450)
POTASSIUM SERPL-SCNC: 3.7 MMOL/L (ref 3.4–5.3)
PROCALCITONIN SERPL-MCNC: 0.06 NG/ML
PROT SERPL-MCNC: 7.3 G/DL (ref 6.8–8.8)
RBC # BLD AUTO: 3.3 10E12/L (ref 3.8–5.2)
RBC #/AREA URNS AUTO: 8 /HPF (ref 0–2)
SODIUM SERPL-SCNC: 135 MMOL/L (ref 133–144)
SOURCE: ABNORMAL
SP GR UR STRIP: 1.01 (ref 1–1.03)
SPECIMEN SOURCE: NORMAL
UROBILINOGEN UR STRIP-MCNC: NORMAL MG/DL (ref 0–2)
WBC # BLD AUTO: 8.6 10E9/L (ref 4–11)
WBC #/AREA URNS AUTO: 1 /HPF (ref 0–5)

## 2019-03-11 PROCEDURE — 71045 X-RAY EXAM CHEST 1 VIEW: CPT

## 2019-03-11 PROCEDURE — 87800 DETECT AGNT MULT DNA DIREC: CPT | Performed by: EMERGENCY MEDICINE

## 2019-03-11 PROCEDURE — 84145 PROCALCITONIN (PCT): CPT | Performed by: EMERGENCY MEDICINE

## 2019-03-11 PROCEDURE — 85025 COMPLETE CBC W/AUTO DIFF WBC: CPT | Performed by: EMERGENCY MEDICINE

## 2019-03-11 PROCEDURE — 87804 INFLUENZA ASSAY W/OPTIC: CPT | Performed by: EMERGENCY MEDICINE

## 2019-03-11 PROCEDURE — 80053 COMPREHEN METABOLIC PANEL: CPT | Performed by: EMERGENCY MEDICINE

## 2019-03-11 PROCEDURE — 81001 URINALYSIS AUTO W/SCOPE: CPT | Performed by: EMERGENCY MEDICINE

## 2019-03-11 PROCEDURE — 87040 BLOOD CULTURE FOR BACTERIA: CPT | Performed by: EMERGENCY MEDICINE

## 2019-03-11 PROCEDURE — 25000128 H RX IP 250 OP 636: Performed by: EMERGENCY MEDICINE

## 2019-03-11 PROCEDURE — 36415 COLL VENOUS BLD VENIPUNCTURE: CPT

## 2019-03-11 PROCEDURE — 87186 SC STD MICRODIL/AGAR DIL: CPT | Performed by: EMERGENCY MEDICINE

## 2019-03-11 PROCEDURE — 83690 ASSAY OF LIPASE: CPT | Performed by: EMERGENCY MEDICINE

## 2019-03-11 PROCEDURE — 96374 THER/PROPH/DIAG INJ IV PUSH: CPT

## 2019-03-11 PROCEDURE — 87077 CULTURE AEROBIC IDENTIFY: CPT | Performed by: EMERGENCY MEDICINE

## 2019-03-11 PROCEDURE — 96376 TX/PRO/DX INJ SAME DRUG ADON: CPT

## 2019-03-11 PROCEDURE — 74177 CT ABD & PELVIS W/CONTRAST: CPT

## 2019-03-11 PROCEDURE — 96361 HYDRATE IV INFUSION ADD-ON: CPT

## 2019-03-11 PROCEDURE — 87086 URINE CULTURE/COLONY COUNT: CPT | Performed by: EMERGENCY MEDICINE

## 2019-03-11 PROCEDURE — 72158 MRI LUMBAR SPINE W/O & W/DYE: CPT

## 2019-03-11 PROCEDURE — 99285 EMERGENCY DEPT VISIT HI MDM: CPT | Mod: 25

## 2019-03-11 PROCEDURE — 83605 ASSAY OF LACTIC ACID: CPT | Performed by: EMERGENCY MEDICINE

## 2019-03-11 PROCEDURE — 25000132 ZZH RX MED GY IP 250 OP 250 PS 637: Mod: GY | Performed by: EMERGENCY MEDICINE

## 2019-03-11 PROCEDURE — 25000125 ZZHC RX 250: Performed by: EMERGENCY MEDICINE

## 2019-03-11 PROCEDURE — A9270 NON-COVERED ITEM OR SERVICE: HCPCS | Mod: GY | Performed by: EMERGENCY MEDICINE

## 2019-03-11 RX ORDER — HYDROMORPHONE HYDROCHLORIDE 1 MG/ML
0.2 INJECTION, SOLUTION INTRAMUSCULAR; INTRAVENOUS; SUBCUTANEOUS ONCE
Status: COMPLETED | OUTPATIENT
Start: 2019-03-11 | End: 2019-03-11

## 2019-03-11 RX ORDER — IOPAMIDOL 755 MG/ML
62 INJECTION, SOLUTION INTRAVASCULAR ONCE
Status: COMPLETED | OUTPATIENT
Start: 2019-03-11 | End: 2019-03-11

## 2019-03-11 RX ORDER — KETOROLAC TROMETHAMINE 15 MG/ML
15 INJECTION, SOLUTION INTRAMUSCULAR; INTRAVENOUS ONCE
Status: COMPLETED | OUTPATIENT
Start: 2019-03-11 | End: 2019-03-12

## 2019-03-11 RX ORDER — ACETAMINOPHEN 500 MG
1000 TABLET ORAL ONCE
Status: COMPLETED | OUTPATIENT
Start: 2019-03-11 | End: 2019-03-11

## 2019-03-11 RX ADMIN — ACETAMINOPHEN 1000 MG: 500 TABLET, FILM COATED ORAL at 21:37

## 2019-03-11 RX ADMIN — IOPAMIDOL 62 ML: 755 INJECTION, SOLUTION INTRAVENOUS at 22:19

## 2019-03-11 RX ADMIN — SODIUM CHLORIDE 1000 ML: 9 INJECTION, SOLUTION INTRAVENOUS at 21:30

## 2019-03-11 RX ADMIN — SODIUM CHLORIDE 1000 ML: 9 INJECTION, SOLUTION INTRAVENOUS at 20:30

## 2019-03-11 RX ADMIN — Medication 0.2 MG: at 21:30

## 2019-03-11 RX ADMIN — Medication 0.2 MG: at 22:50

## 2019-03-11 RX ADMIN — SODIUM CHLORIDE 60 ML: 9 INJECTION, SOLUTION INTRAVENOUS at 22:19

## 2019-03-11 SDOH — HEALTH STABILITY: MENTAL HEALTH: HOW OFTEN DO YOU HAVE A DRINK CONTAINING ALCOHOL?: NEVER

## 2019-03-11 ASSESSMENT — MIFFLIN-ST. JEOR: SCORE: 861.8

## 2019-03-11 NOTE — ED AVS SNAPSHOT
Emergency Department  [address] [place] [id number]  Phone:  [phone]  Fax:  [phone]                                    Parvin Wen   MRN: [id number]    Department:   Emergency Department   Date of Visit:  3/11/2019           After Visit Summary Signature Page    I have received my discharge instructions, and my questions have been answered. I have discussed any challenges I see with this plan with the nurse or doctor.    ..........................................................................................................................................  Patient/Patient Representative Signature      ..........................................................................................................................................  Patient Representative Print Name and Relationship to Patient    ..................................................               ................................................  Date                                   Time    ..........................................................................................................................................  Reviewed by Signature/Title    ...................................................              ..............................................  Date                                               Time          22EPIC Rev 08/18

## 2019-03-12 ENCOUNTER — APPOINTMENT (OUTPATIENT)
Dept: CT IMAGING | Facility: CLINIC | Age: 84
DRG: 560 | End: 2019-03-12
Attending: INTERNAL MEDICINE
Payer: MEDICARE

## 2019-03-12 ENCOUNTER — MEDICAL CORRESPONDENCE (OUTPATIENT)
Dept: HEALTH INFORMATION MANAGEMENT | Facility: CLINIC | Age: 84
End: 2019-03-12

## 2019-03-12 PROBLEM — M54.50 LOWER BACK PAIN: Status: ACTIVE | Noted: 2019-03-12

## 2019-03-12 LAB
BACTERIA SPEC CULT: NO GROWTH
Lab: NORMAL
PROCALCITONIN SERPL-MCNC: 0.19 NG/ML
SPECIMEN SOURCE: NORMAL

## 2019-03-12 PROCEDURE — 25000128 H RX IP 250 OP 636: Performed by: EMERGENCY MEDICINE

## 2019-03-12 PROCEDURE — 99223 1ST HOSP IP/OBS HIGH 75: CPT | Mod: AI | Performed by: INTERNAL MEDICINE

## 2019-03-12 PROCEDURE — 94640 AIRWAY INHALATION TREATMENT: CPT

## 2019-03-12 PROCEDURE — 25800030 ZZH RX IP 258 OP 636: Performed by: INTERNAL MEDICINE

## 2019-03-12 PROCEDURE — 25000131 ZZH RX MED GY IP 250 OP 636 PS 637: Mod: GY | Performed by: INTERNAL MEDICINE

## 2019-03-12 PROCEDURE — 25800030 ZZH RX IP 258 OP 636: Performed by: EMERGENCY MEDICINE

## 2019-03-12 PROCEDURE — 36415 COLL VENOUS BLD VENIPUNCTURE: CPT | Performed by: INTERNAL MEDICINE

## 2019-03-12 PROCEDURE — 25000128 H RX IP 250 OP 636: Performed by: INTERNAL MEDICINE

## 2019-03-12 PROCEDURE — 87040 BLOOD CULTURE FOR BACTERIA: CPT | Performed by: INTERNAL MEDICINE

## 2019-03-12 PROCEDURE — 25000125 ZZHC RX 250: Performed by: HOSPITALIST

## 2019-03-12 PROCEDURE — 96365 THER/PROPH/DIAG IV INF INIT: CPT

## 2019-03-12 PROCEDURE — 25000132 ZZH RX MED GY IP 250 OP 250 PS 637: Mod: GY | Performed by: INTERNAL MEDICINE

## 2019-03-12 PROCEDURE — 12000000 ZZH R&B MED SURG/OB

## 2019-03-12 PROCEDURE — 84145 PROCALCITONIN (PCT): CPT | Performed by: INTERNAL MEDICINE

## 2019-03-12 PROCEDURE — 96375 TX/PRO/DX INJ NEW DRUG ADDON: CPT

## 2019-03-12 PROCEDURE — 87077 CULTURE AEROBIC IDENTIFY: CPT | Performed by: INTERNAL MEDICINE

## 2019-03-12 PROCEDURE — A9270 NON-COVERED ITEM OR SERVICE: HCPCS | Mod: GY | Performed by: INTERNAL MEDICINE

## 2019-03-12 PROCEDURE — 73702 CT LWR EXTREMITY W/O&W/DYE: CPT | Mod: LT

## 2019-03-12 PROCEDURE — 25500064 ZZH RX 255 OP 636: Performed by: EMERGENCY MEDICINE

## 2019-03-12 PROCEDURE — 40000275 ZZH STATISTIC RCP TIME EA 10 MIN

## 2019-03-12 PROCEDURE — A9585 GADOBUTROL INJECTION: HCPCS | Performed by: EMERGENCY MEDICINE

## 2019-03-12 PROCEDURE — 99207 ZZC APP CREDIT; MD BILLING SHARED VISIT: CPT | Performed by: INTERNAL MEDICINE

## 2019-03-12 RX ORDER — CEFAZOLIN SODIUM 2 G/100ML
2 INJECTION, SOLUTION INTRAVENOUS EVERY 8 HOURS
Status: DISCONTINUED | OUTPATIENT
Start: 2019-03-12 | End: 2019-03-18 | Stop reason: HOSPADM

## 2019-03-12 RX ORDER — ALENDRONATE SODIUM 70 MG/1
70 TABLET ORAL
Status: DISCONTINUED | OUTPATIENT
Start: 2019-03-12 | End: 2019-03-12

## 2019-03-12 RX ORDER — ATORVASTATIN CALCIUM 40 MG/1
40 TABLET, FILM COATED ORAL
Status: DISCONTINUED | OUTPATIENT
Start: 2019-03-12 | End: 2019-03-18 | Stop reason: HOSPADM

## 2019-03-12 RX ORDER — IOPAMIDOL 755 MG/ML
62 INJECTION, SOLUTION INTRAVASCULAR ONCE
Status: COMPLETED | OUTPATIENT
Start: 2019-03-12 | End: 2019-03-12

## 2019-03-12 RX ORDER — POLYETHYLENE GLYCOL 3350 17 G/17G
17 POWDER, FOR SOLUTION ORAL DAILY
Status: DISCONTINUED | OUTPATIENT
Start: 2019-03-12 | End: 2019-03-14

## 2019-03-12 RX ORDER — BUPROPION HYDROCHLORIDE 150 MG/1
150 TABLET, EXTENDED RELEASE ORAL EVERY MORNING
Status: DISCONTINUED | OUTPATIENT
Start: 2019-03-12 | End: 2019-03-17

## 2019-03-12 RX ORDER — MULTIPLE VITAMINS W/ MINERALS TAB 9MG-400MCG
1 TAB ORAL DAILY
Status: DISCONTINUED | OUTPATIENT
Start: 2019-03-12 | End: 2019-03-18 | Stop reason: HOSPADM

## 2019-03-12 RX ORDER — OXYCODONE HYDROCHLORIDE 5 MG/1
5-10 TABLET ORAL
Status: DISCONTINUED | OUTPATIENT
Start: 2019-03-12 | End: 2019-03-18 | Stop reason: HOSPADM

## 2019-03-12 RX ORDER — ACETAMINOPHEN 650 MG/1
650 SUPPOSITORY RECTAL EVERY 4 HOURS PRN
Status: DISCONTINUED | OUTPATIENT
Start: 2019-03-12 | End: 2019-03-18 | Stop reason: HOSPADM

## 2019-03-12 RX ORDER — AMLODIPINE BESYLATE 5 MG/1
5 TABLET ORAL EVERY MORNING
Status: DISCONTINUED | OUTPATIENT
Start: 2019-03-12 | End: 2019-03-18 | Stop reason: HOSPADM

## 2019-03-12 RX ORDER — SENNOSIDES 8.6 MG
2 TABLET ORAL 2 TIMES DAILY
Status: DISCONTINUED | OUTPATIENT
Start: 2019-03-12 | End: 2019-03-18 | Stop reason: HOSPADM

## 2019-03-12 RX ORDER — PIPERACILLIN SODIUM, TAZOBACTAM SODIUM 3; .375 G/15ML; G/15ML
3.38 INJECTION, POWDER, LYOPHILIZED, FOR SOLUTION INTRAVENOUS EVERY 6 HOURS
Status: DISCONTINUED | OUTPATIENT
Start: 2019-03-12 | End: 2019-03-12

## 2019-03-12 RX ORDER — CEFAZOLIN SODIUM 1 G/50ML
1250 SOLUTION INTRAVENOUS EVERY 24 HOURS
Status: DISCONTINUED | OUTPATIENT
Start: 2019-03-12 | End: 2019-03-12

## 2019-03-12 RX ORDER — IPRATROPIUM BROMIDE AND ALBUTEROL SULFATE 2.5; .5 MG/3ML; MG/3ML
3 SOLUTION RESPIRATORY (INHALATION) EVERY 4 HOURS PRN
Status: DISCONTINUED | OUTPATIENT
Start: 2019-03-12 | End: 2019-03-18 | Stop reason: HOSPADM

## 2019-03-12 RX ORDER — GADOBUTROL 604.72 MG/ML
5 INJECTION INTRAVENOUS ONCE
Status: COMPLETED | OUTPATIENT
Start: 2019-03-12 | End: 2019-03-12

## 2019-03-12 RX ORDER — PIPERACILLIN SODIUM, TAZOBACTAM SODIUM 3; .375 G/15ML; G/15ML
3.38 INJECTION, POWDER, LYOPHILIZED, FOR SOLUTION INTRAVENOUS ONCE
Status: COMPLETED | OUTPATIENT
Start: 2019-03-12 | End: 2019-03-12

## 2019-03-12 RX ORDER — AMOXICILLIN 250 MG
1-2 CAPSULE ORAL 2 TIMES DAILY
Status: DISCONTINUED | OUTPATIENT
Start: 2019-03-12 | End: 2019-03-12

## 2019-03-12 RX ORDER — AMOXICILLIN 250 MG
2 CAPSULE ORAL 2 TIMES DAILY
Status: DISCONTINUED | OUTPATIENT
Start: 2019-03-12 | End: 2019-03-12 | Stop reason: DRUGHIGH

## 2019-03-12 RX ORDER — HYDROMORPHONE HYDROCHLORIDE 1 MG/ML
0.2 INJECTION, SOLUTION INTRAMUSCULAR; INTRAVENOUS; SUBCUTANEOUS
Status: DISCONTINUED | OUTPATIENT
Start: 2019-03-12 | End: 2019-03-13

## 2019-03-12 RX ORDER — ASPIRIN 81 MG/1
81 TABLET ORAL EVERY MORNING
Status: DISCONTINUED | OUTPATIENT
Start: 2019-03-12 | End: 2019-03-18 | Stop reason: HOSPADM

## 2019-03-12 RX ORDER — ACETAMINOPHEN 500 MG
500-1000 TABLET ORAL EVERY 8 HOURS PRN
Status: DISCONTINUED | OUTPATIENT
Start: 2019-03-12 | End: 2019-03-12

## 2019-03-12 RX ORDER — ONDANSETRON 4 MG/1
4 TABLET, ORALLY DISINTEGRATING ORAL EVERY 6 HOURS PRN
Status: DISCONTINUED | OUTPATIENT
Start: 2019-03-12 | End: 2019-03-18 | Stop reason: HOSPADM

## 2019-03-12 RX ORDER — NALOXONE HYDROCHLORIDE 0.4 MG/ML
.1-.4 INJECTION, SOLUTION INTRAMUSCULAR; INTRAVENOUS; SUBCUTANEOUS
Status: DISCONTINUED | OUTPATIENT
Start: 2019-03-12 | End: 2019-03-18 | Stop reason: HOSPADM

## 2019-03-12 RX ORDER — ACETAMINOPHEN 500 MG
1000 TABLET ORAL EVERY 8 HOURS
Status: DISCONTINUED | OUTPATIENT
Start: 2019-03-12 | End: 2019-03-18 | Stop reason: HOSPADM

## 2019-03-12 RX ORDER — AMOXICILLIN 250 MG
1 CAPSULE ORAL 2 TIMES DAILY
Status: DISCONTINUED | OUTPATIENT
Start: 2019-03-12 | End: 2019-03-12 | Stop reason: DRUGHIGH

## 2019-03-12 RX ORDER — CITALOPRAM HYDROBROMIDE 40 MG/1
40 TABLET ORAL EVERY MORNING
Status: DISCONTINUED | OUTPATIENT
Start: 2019-03-12 | End: 2019-03-18 | Stop reason: HOSPADM

## 2019-03-12 RX ORDER — OXYBUTYNIN CHLORIDE 5 MG/1
5 TABLET ORAL 2 TIMES DAILY
Status: DISCONTINUED | OUTPATIENT
Start: 2019-03-12 | End: 2019-03-18 | Stop reason: HOSPADM

## 2019-03-12 RX ADMIN — GADOBUTROL 5 ML: 604.72 INJECTION INTRAVENOUS at 00:09

## 2019-03-12 RX ADMIN — IOPAMIDOL 62 ML: 755 INJECTION, SOLUTION INTRAVENOUS at 16:17

## 2019-03-12 RX ADMIN — ACETAMINOPHEN 1000 MG: 500 TABLET, FILM COATED ORAL at 21:00

## 2019-03-12 RX ADMIN — OMEPRAZOLE 40 MG: 20 CAPSULE, DELAYED RELEASE ORAL at 08:51

## 2019-03-12 RX ADMIN — MULTIPLE VITAMINS W/ MINERALS TAB 1 TABLET: TAB at 08:51

## 2019-03-12 RX ADMIN — Medication 0.2 MG: at 16:51

## 2019-03-12 RX ADMIN — SENNOSIDES 2 TABLET: 8.6 TABLET, FILM COATED ORAL at 21:03

## 2019-03-12 RX ADMIN — PIPERACILLIN SODIUM,TAZOBACTAM SODIUM 3.38 G: 3; .375 INJECTION, POWDER, FOR SOLUTION INTRAVENOUS at 14:13

## 2019-03-12 RX ADMIN — ASPIRIN 81 MG: 81 TABLET, COATED ORAL at 08:50

## 2019-03-12 RX ADMIN — KETOROLAC TROMETHAMINE 15 MG: 15 INJECTION, SOLUTION INTRAMUSCULAR; INTRAVENOUS at 00:37

## 2019-03-12 RX ADMIN — CITALOPRAM HYDROBROMIDE 40 MG: 40 TABLET ORAL at 08:50

## 2019-03-12 RX ADMIN — PIPERACILLIN SODIUM,TAZOBACTAM SODIUM 3.38 G: 3; .375 INJECTION, POWDER, FOR SOLUTION INTRAVENOUS at 08:50

## 2019-03-12 RX ADMIN — OXYBUTYNIN CHLORIDE 5 MG: 5 TABLET ORAL at 21:03

## 2019-03-12 RX ADMIN — OXYBUTYNIN CHLORIDE 5 MG: 5 TABLET ORAL at 08:51

## 2019-03-12 RX ADMIN — OXYCODONE HYDROCHLORIDE 5 MG: 5 TABLET ORAL at 06:49

## 2019-03-12 RX ADMIN — ONDANSETRON 4 MG: 4 TABLET, ORALLY DISINTEGRATING ORAL at 06:53

## 2019-03-12 RX ADMIN — IPRATROPIUM BROMIDE AND ALBUTEROL SULFATE 3 ML: 2.5; .5 SOLUTION RESPIRATORY (INHALATION) at 21:14

## 2019-03-12 RX ADMIN — SENNOSIDES AND DOCUSATE SODIUM 2 TABLET: 8.6; 5 TABLET ORAL at 08:50

## 2019-03-12 RX ADMIN — AMLODIPINE BESYLATE 5 MG: 5 TABLET ORAL at 08:51

## 2019-03-12 RX ADMIN — VANCOMYCIN HYDROCHLORIDE 1250 MG: 5 INJECTION, POWDER, LYOPHILIZED, FOR SOLUTION INTRAVENOUS at 15:07

## 2019-03-12 RX ADMIN — VITAMIN D, TAB 1000IU (100/BT) 2000 UNITS: 25 TAB at 08:51

## 2019-03-12 RX ADMIN — PIPERACILLIN SODIUM,TAZOBACTAM SODIUM 3.38 G: 3; .375 INJECTION, POWDER, FOR SOLUTION INTRAVENOUS at 02:21

## 2019-03-12 RX ADMIN — ACETAMINOPHEN 1000 MG: 500 TABLET, FILM COATED ORAL at 08:51

## 2019-03-12 RX ADMIN — POLYETHYLENE GLYCOL 3350 17 G: 17 POWDER, FOR SOLUTION ORAL at 10:40

## 2019-03-12 RX ADMIN — OXYCODONE HYDROCHLORIDE 5 MG: 5 TABLET ORAL at 13:42

## 2019-03-12 RX ADMIN — SODIUM CHLORIDE 500 MG: 9 INJECTION, SOLUTION INTRAVENOUS at 03:15

## 2019-03-12 RX ADMIN — BUPROPION HYDROCHLORIDE 150 MG: 150 TABLET, EXTENDED RELEASE ORAL at 08:50

## 2019-03-12 RX ADMIN — CEFAZOLIN SODIUM 2 G: 2 INJECTION, SOLUTION INTRAVENOUS at 22:02

## 2019-03-12 ASSESSMENT — ENCOUNTER SYMPTOMS
ABDOMINAL PAIN: 1
DYSURIA: 0
COUGH: 0
CHILLS: 1
NUMBNESS: 0
CONSTIPATION: 1
HEMATURIA: 0
FEVER: 1
BACK PAIN: 1
DIFFICULTY URINATING: 0
FREQUENCY: 0

## 2019-03-12 ASSESSMENT — ACTIVITIES OF DAILY LIVING (ADL)
ADLS_ACUITY_SCORE: 16
ADLS_ACUITY_SCORE: 16
ADLS_ACUITY_SCORE: 17

## 2019-03-12 ASSESSMENT — MIFFLIN-ST. JEOR: SCORE: 875

## 2019-03-12 NOTE — PLAN OF CARE
Arrived at approx 0400. A&Ox4, VSS on 1L NC. Denies back pain pain at rest. C/o pain with movement, gave 1 tab PRN oxy and 1 tab zofran. Lung sounds diminished. Up Ax1-2, usually uses walker. Up with 2 to commode this shift. Reg diet, voiding. IV SL. Plan for Ortho consult in AM. Will continue to monitor.

## 2019-03-12 NOTE — PROVIDER NOTIFICATION
MD Notification    Notified Person: MD    Notified Person Name: Eloisa    Notification Date/Time: 3/12/19 1130    Notification Interaction: web-paged    Purpose of Notification: Lab called with positive blood cultures. Gram positive cocci in clusters.    Orders Received:     Comments:

## 2019-03-12 NOTE — ED NOTES
Bed: ED29  Expected date:   Expected time:   Means of arrival:   Comments:  [place] [address] 89F back pain

## 2019-03-12 NOTE — CONSULTS
Care Transition Initial Assessment - RN        Met with: Patient.  DATA   Active Problems:    Lower back pain       Cognitive Status: awake, alert and oriented.        Contact information and PCP information verified: Yes  Lives With: alone      Insurance concerns: No Insurance issues identified  ASSESSMENT  Patient currently receives the following services:  NA        Identified issues/concerns regarding health management: In discussion with pt, she exhibits pretty severe back pain depending how she is lying.  Pt was at the [facility] under Observation status 3/6-3/8.  That hospital is closest to her home.  They had recommended a TCU.  Pt was not able to afford the cost of this.  Pt wanted to come to [facility] due to having Dr Murphy do orthopedic surgeries in the past.  Pt feels her back pain is much worse than when she presented to [facility] last week.  Pt resides in her own home.  She does drive, but has not attempted this over the Winter months.  Her laundry is in the basement.  Pt has five children that she noted are supportive.  Awaiting Orthopedic consult.  Pt will most likely be here awhile due to bacteremia.  Pt is open to transitioning to a TCU when she is medically stable, and would like to go to the facility in [place], where she has been before.      Care  (CTS) will continue to follow as needed.

## 2019-03-12 NOTE — PLAN OF CARE
RECEIVING UNIT ED HANDOFF REVIEW    ED Nurse Handoff Report was reviewed by: Vee Crump on March 12, 2019 at 3:10 AM

## 2019-03-12 NOTE — PHARMACY-VANCOMYCIN DOSING SERVICE
Pharmacy Vancomycin Initial Note  Date of Service 2019  Patient's  1930  88 year old, female    Indication: Bacteremia    Current estimated CrCl = Estimated Creatinine Clearance: 40.3 mL/min (based on SCr of 0.87 mg/dL).    Creatinine for last 3 days  3/11/2019:  8:10 PM Creatinine 0.87 mg/dL    Recent Vancomycin Level(s) for last 3 days  No results found for requested labs within last 72 hours.      Vancomycin IV Administrations (past 72 hours)      No vancomycin orders with administrations in past 72 hours.                Nephrotoxins and other renal medications (From now, onward)    Start     Dose/Rate Route Frequency Ordered Stop    19 1500  vancomycin (VANCOCIN) 1,250 mg in sodium chloride 0.9 % 250 mL intermittent infusion      1,250 mg  over 90 Minutes Intravenous EVERY 24 HOURS 19 1448      19 0800  piperacillin-tazobactam (ZOSYN) 3.375 g vial to attach to  mL bag     Comments:  Pharmacy can adjust dose based on renal function.    3.375 g  over 30 Minutes Intravenous EVERY 6 HOURS 19 0351            Contrast Orders - past 72 hours (72h ago, onward)    Start     Dose/Rate Route Frequency Ordered Stop    19 0010  gadobutrol (GADAVIST) injection 5 mL      5 mL Intravenous ONCE 19 0009 19 0009    19 2219  iopamidol (ISOVUE-370) solution 62 mL      62 mL Intravenous ONCE 19 2218 19 2219                Plan:  1.  Start vancomycin  1.25g IV q24h.   2.  Goal Trough Level: 15-20 mg/L   3.  Pharmacy will check trough levels as appropriate in 1-3 Days.    4. Serum creatinine levels will be ordered daily for the first week of therapy and at least twice weekly for subsequent weeks.    5. [place]eca method utilized to dose vancomycin therapy: Method 1    Lis Arciinega

## 2019-03-12 NOTE — PROGRESS NOTES
[facility]    Hospitalist Progress Note    Date of Service (when I saw the patient): 03/12/2019    Assessment & Plan   Parvin Wen is a 88 year old female who was admitted on 3/11/2019.  Assessment & Plan     Parvin Wen is a 88 year old female admitted on 3/11/2019 for lower back pain and pneumonia.     1.) Left Lower Back and Hip Pain  - She was due to see Dr. Fernández with [facility] this Wednesday, hip did a left sided hip replacement a year ago  - recent hospitalization at [facility] from 3/6-3/8 for pain, was sent home with oxycodone  - for tonight, continue PTA oxycodone, add IV dilaudid for breakthrough  - MRI of Lumbar spine doesn't show anything new, no sign of discitis  - will consult Ortho to see the patient, make therapy and pain recommendations, defer further imaging of the hip to their service  CT left hip ordered today      2.) Fever, Possible Health Care Associated Pneumonia  MSSA bacteremia   - She was noted to be 102.8F on presentation  - she didn't come in for SOB or cough, but admitted that she has had both since going home from [facility], mild non-productive cough  - initially didn't need O2 but after taking IV dilaudid she dropped  - CXR shows possible infiltrate vs atelecatsis in the RML  - started on IV Zosyn and Zithromax in ER, will continue these on admission and check procalcitonin  Blood cultures 2 out of 2 positive for MSSA which should be covered by zosyn so stop zithromax today   Repeat cultures today   ID consult placed   - workup for left hip issues as above  - MRI Spine negative for discitis  Speech path consulted to evaluate for aspiration      3.) HTN  - BP well controlled today   - continue PTA norvasc  - PRN IV labetalol and hydralazine for SBP>180     4.) Depression  - mood is stable  - continue PTA Celexa and Wellbutrin     5.) HLP  - PTA Lipitor     6.) GERD  - PTA PPI       Diet: Regular  DVT Prophylaxis: Pneumatic Compression Devices  Katie  Catheter: not present  Code Status: FULL        Disposition Plan     Expected discharge: 3-4days when OK with ID     Discussed with RN and patient today   Elsy Amin MD  [phone] (P)      Interval History   Remains febrile today. 2out of 2 blood cultures returned positive for MSSA. Ortho to see her today . C/o back pain     -Data reviewed today: I reviewed all new labs and imaging results over the last 24 hours. I personally reviewed no images or EKG's today.    Physical Exam   Temp: 101.7  F (38.7  C) Temp src: Oral BP: 137/58 Pulse: 89   Resp: 16 SpO2: (!) 86 % O2 Device: None (Room air) Oxygen Delivery: 1 LPM  Vitals:    03/11/19 1937 03/12/19 0353   Weight: 55.8 kg (123 lb) 57.1 kg (125 lb 14.1 oz)     Vital Signs with Ranges  Temp:  [98.3  F (36.8  C)-102.8  F (39.3  C)] 101.7  F (38.7  C)  Pulse:  [43-94] 89  Resp:  [16-18] 16  BP: (117-147)/() 137/58  SpO2:  [86 %-96 %] 86 %  I/O last 3 completed shifts:  In: 1000 [IV Piggyback:1000]  Out: -     Constitutional: Awake, alert, cooperative, no apparent distress  Respiratory: Clear to auscultation bilaterally, no crackles or wheezing  Cardiovascular: Regular rate and rhythm, normal S1 and S2, and no murmur noted  GI: Normal bowel sounds, soft, non-distended, non-tender  Skin/Integumen: No rashes, no cyanosis, no edema  Other:     Medications       acetaminophen  1,000 mg Oral Q8H     amLODIPine  5 mg Oral QAM     aspirin  81 mg Oral QAM     atorvastatin  40 mg Oral Daily with supper     buPROPion  150 mg Oral QAM     calcium carbonate 600 mg-vitamin D 400 units  1 tablet Oral Daily with supper     citalopram  40 mg Oral QAM     multivitamin w/minerals  1 tablet Oral Daily     omeprazole  40 mg Oral QAM     oxybutynin  5 mg Oral BID     piperacillin-tazobactam  3.375 g Intravenous Q6H     polyethylene glycol  17 g Oral Daily     sennosides  2 tablet Oral BID     vitamin D3  2,000 Units Oral QAM       Data   Recent Labs   Lab 03/11/19 2010   WBC 8.6    HGB 10.1*   MCV 92         POTASSIUM 3.7   CHLORIDE 104   CO2 24   BUN 18   CR 0.87   ANIONGAP 7   MISSY 8.1*   *   ALBUMIN 3.1*   PROTTOTAL 7.3   BILITOTAL 0.5   ALKPHOS 88   ALT 24   AST 23   LIPASE 177       Recent Results (from the past 24 hour(s))   XR Chest 1 View    Narrative    XR CHEST ONE VIEW   3/11/2019 9:55 PM     HISTORY: Fever.    COMPARISON: 11/20/2016      Impression    IMPRESSION: There is increased patchy opacity within the inferomedial  right lung along the right heart border which could represent  aspiration, infection, or atelectasis. Background of perihilar and  basilar opacities are favored to represent a combination of mild  vascular enlargement and atelectasis/chronic scarring, similar  compared to the prior exam. No evidence of effusion. Multiple old  right-sided rib fractures are present. Heart size is unchanged. Mid  thoracic compression fractures and changes of vertebroplasty are  noted.    JAMES ENGLAND MD   CT Abdomen Pelvis w Contrast    Narrative    CT ABDOMEN AND PELVIS WITH CONTRAST   3/11/2019 10:19 PM     HISTORY: Abdominal pain, fever, rule out diverticulitis.    TECHNIQUE: 62 mL Isovue-370. CT images of the abdomen and pelvis  following nonionic intravenous contrast. Radiation dose for this scan  was reduced using automated exposure control, adjustment of the mA  and/or kV according to patient size, or iterative reconstruction  technique.    COMPARISON: None.    FINDINGS:   The liver, spleen, and pancreas are unremarkable. The gallbladder is  distended. The adrenal glands appear normal. There is no  hydronephrosis. No evidence of solid renal mass. The aorta is  atherosclerotic but not aneurysmal. There is severe coronary  atherosclerosis.    No free air or ascites. No evidence of appendicitis or diverticulitis.  No bowel obstruction. There is moderate volume of retained colonic  stool in the ascending colon. No enlarged abdominal or pelvic lymph  nodes.  There are bilateral hip replacements. There are multiple lumbar  compression deformities, some of which have undergone vertebroplasty.      Impression    IMPRESSION:  1. Moderate volume of retained colonic stool in the right colon  suggests constipation.  2. Gallbladder distention without other CT evidence of cholecystitis.  3. No cause for fever demonstrated.    MARIO NORMAN MD   Lumbar spine MRI w & w/o contrast - surgery <10yrs    Narrative    MRI OF THE LUMBAR SPINE WITHOUT AND WITH CONTRAST  3/12/2019 12:20 AM     HISTORY:  Back pain, fever. Rule out disc, infection.    TECHNIQUE: Multiplanar, multisequence MRI images of the lumbar spine  were acquired without and with 5 mL Gadavist IV contrast.    COMPARISON: Lumbar spine CT scan 5/30/2012.    FINDINGS: There are five lumbar-type vertebrae for the purposes of  this dictation.     Compression deformities of all five lumbar vertebral bodies again  noted without definite change. Vertebroplasty changes in the L3 and L4  vertebral bodies again noted. There is no bone marrow edema in any of  the lumbar vertebral bodies to suggest recent or progressing fracture  abnormality. Alignment of the lumbar vertebrae remains normal. There  is no abnormal contrast enhancement in any of the lumbar vertebrae.    There is posterior disc bulging/herniation to varying degrees at all  levels of the lumbar spine. There is no abnormal contrast enhancement  in any of the lumbar intervertebral discs.    The tip of the conus medullaris is at the mid L2 level which is within  normal limits. There is no evidence for intrathecal abnormality. There  is no abnormal intrathecal contrast enhancement.    Level by level:     L1-L2: There is a circumferential disc bulge and mild facet  arthropathy bilaterally. There is mild bilateral neural foraminal  narrowing and minimal spinal canal narrowing.    L2-L3: There is a circumferential disc bulge, thickening of the  ligamentum flavum bilaterally  and mild facet arthropathy bilaterally.  These findings result in mild-moderate spinal canal narrowing,  mild-moderate right foraminal narrowing and mild left foraminal  narrowing.    L3-L4: There is a circumferential disc bulge, mild thickening of the  ligamentum flavum bilaterally and mild facet arthropathy bilaterally.  These findings result in mild-moderate bilateral neural foraminal  narrowing and mild spinal narrowing.    L4-L5: There is a circumferential disc bulge, circumferential endplate  spurring, thickening of the ligamentum flavum bilaterally and moderate  facet arthropathy bilaterally. These findings result in moderate  spinal canal stenosis, mild-moderate left foraminal narrowing and mild  right foraminal narrowing.    L5-S1: There is a circumferential disc bulge, moderate facet  arthropathy bilaterally and mild thickening of the ligamentum flavum  bilaterally. These findings result in moderate bilateral neural  foraminal stenosis but no significant spinal canal narrowing.      Impression    IMPRESSION:  1. Chronic compression deformities of all five lumbar vertebral bodies  again noted without definite change. There is no bone marrow edema in  any of the lumbar vertebrae to suggest recent or progressing  compression fractures.  2. Diffuse degenerative changes of the lumbar spine as described  above.  3. No findings to suggest discitis.

## 2019-03-12 NOTE — ED PROVIDER NOTES
History     Chief Complaint:  Back Pain and Fever      HPI   Parvin Wen is a 88 year old female with a history of osteoporosis and arthritis who presents with back pain. The patient reports to have developed back pain 5 days ago. She reports to have been recently admitted to [place] for this with CT lumbar spine and abdomen which showed compression fractures. She was discharged with oxycodone, lidocaine, muscle relaxers with worsening back pain in the past 3 days, as well as onset of fevers and chills today, prompting her presentation by ambulance. She describes her pain as located to the lower left back with radiation to abdomen. Laying horizontal on left side is most comfortable for the patient, otherwise, she reports increased pain. She states that she has not had a bowel movement for two days. She denies recent falls, rash, dysuria, urine issues, numbness, and tingling. Of note, the patient's son notes the patient has not been confused. Has had cough, URI and saw her doctor 2 weeks ago for this.    Allergies:  Propofol    Medications:    Acetaminophen (TYLENOL PO)  ALENdronate (FOSAMAX) 70 MG tablet  AMLODIPINE BESYLATE PO  ASPIRIN EC PO  ATORVASTATIN CALCIUM PO  BuPROPion HCl (WELLBUTRIN SR PO)  calcium-vitamin D (CALTRATE) 600-400 MG-UNIT per tablet  citalopram (CELEXA) 40 MG tablet  Multiple Vitamin (MULTI-VITAMIN PO)  omeprazole (PRILOSEC) 40 MG capsule  ondansetron (ZOFRAN-ODT) 4 MG ODT tab  oxybutynin (DITROPAN) 5 MG tablet  oxyCODONE IR (ROXICODONE) 5 MG tablet  senna-docusate (SENOKOT-S;PERICOLACE) 8.6-50 MG per tablet  VITAMIN D, CHOLECALCIFEROL, PO    Past Medical History:    Arthritis   Compression fracture of spine (H)   Depression   Dysphagia   Gastro-oesophageal reflux disease   Hiatal hernia   Hyperlipidemia   Hypertension   Osteoporosis   Femur fracture, left (H)  Closed right hip fracture (H)  Hip joint replacement status    Past Surgical History:    ARTHROPLASTY HIP, Right  "Total  ARTHROPLASTY HIP, Left  GI SURGERY: EGD  Hysterectomy  OPEN REDUCTION INTERNAL FIXATION HIP NAILING   ORTHOPEDIC SURGERY: TKA  REMOVE HARDWARE HIP NAILING, Left    Family History:    History reviewed. No pertinent family history.     Social History:  Marital Status:     Tobacco Status: Never Used  Alcohol Use: Never   Drug Use: No  Presents: By Ambulance With Son         Review of Systems   Constitutional: Positive for chills and fever.   Respiratory: Negative for cough.    Gastrointestinal: Positive for abdominal pain and constipation.   Genitourinary: Negative for decreased urine volume, difficulty urinating, dysuria, frequency, hematuria and urgency.   Musculoskeletal: Positive for back pain.   Skin: Negative for rash.   Neurological: Negative for numbness.   All other systems reviewed and are negative.    Physical Exam     Patient Vitals for the past 24 hrs:   BP Temp Temp src Pulse SpO2 Height Weight   03/12/19 0040 -- 99.1  F (37.3  C) Oral -- -- -- --   03/11/19 2259 -- 101.2  F (38.4  C) Oral -- -- -- --   03/11/19 2157 142/74 -- -- 89 -- -- --   03/11/19 2139 -- -- -- -- 94 % -- --   03/11/19 2130 142/67 -- -- 94 96 % -- --   03/11/19 2100 131/66 -- -- 92 93 % -- --   03/11/19 2030 129/69 -- -- 88 90 % -- --   03/11/19 2015 (!) 144/103 -- -- 89 93 % -- --   03/11/19 1937 147/82 102.8  F (39.3  C) Oral 87 94 % 1.448 m (4' 9\") 55.8 kg (123 lb)       Physical Exam    General: Appears uncomfortable, lying on her left side  Eyes:  The pupils are equal and round    Conjunctivae and sclerae are normal  ENT:    Moist mucous membranes  Neck:  Normal range of motion  CV:  Regular rate and rhythm    Skin warm and well perfused     DP/PT pulses 2+ bilateraly  Resp:  Lungs are clear    Non-labored    No rales    No wheezing   GI:  Abdomen is soft, there is no rigidity    No distension    No rebound tenderness     Mild diffuse abdominal tenderness  MS:  Tender diffusely on low back  Skin:  No rash or acute " skin lesions noted  Neuro:   Awake, alert.      Speech is normal and fluent.    Face is symmetric.     Moves all extremities equally    SILT on bilateral lE  Psych: Normal affect.  Appropriate interactions.    Emergency Department Course     Imaging:  Radiographic findings were communicated with the patient and Admitting MD who voiced understanding of the findings.  X-ray Chest, 1 view:  IMPRESSION: There is increased patchy opacity within the inferomedial  right lung along the right heart border which could represent  aspiration, infection, or atelectasis. Background of perihilar and  basilar opacities are favored to represent a combination of mild  vascular enlargement and atelectasis/chronic scarring, similar  compared to the prior exam. No evidence of effusion. Multiple old  right-sided rib fractures are present. Heart size is unchanged. Mid  thoracic compression fractures and changes of vertebroplasty are  noted.  Result per radiology.   CT-scan Abdomen/Pelvis w/ contrast:  IMPRESSION:  1. Moderate volume of retained colonic stool in the right colon  suggests constipation.  2. Gallbladder distention without other CT evidence of cholecystitis.  3. No cause for fever demonstrated.  Preliminary result per radiology.   Lumbar Spine MRI w & w/o contrast - Surgery <10 years:   Impression:   1. Moderate compression fracture of T12 with associated moderate marrow edema as well as small prevertebral soft tissue edema, likely acute to subacute in chronicity.   2. Moderate to severe compression fracture of L4 with associated mild marrow edema, likely subacute in chronicity.   3. Moderate to severe compression fracture from L1 to L3. Vertebroplasty changes at L3 and L4.  4. Mild to moderate spinal canal narrowing at L4-L5.  5. Narrowing of the lateral recesses at L2-L3. Narrowing of the right lateral recess at L1-L2.  6. Mild Narrowing of the left lateral recess at L3-L4.  7. Moderate left and mild to moderate right  neuroforaminal narrowing at L3-L4.   8. Probable contact with the undersurface of the exiting left L3 nerve root.   Result per [facility].     Laboratory:  Blood Culture: Pending x2  Influenza A/B Antigen: Both Negative  Urine Culture: Pending  UA with Microscopic: Ketone 10 (A), Blood Small (A), Protein Albumin 10 (A), RBC/HPF 8 (H), Mucous Present (A) o/w Unremarkable   Procalcitonin: 0.06 (N)  Lipase: 177 (N)  CMP: Glucose 108 (H), GFR 59 (L), Calcium 8.1 (L), Albumin 3.1 (L) o/w WNL (Creatinine 0.87)  CBC: RBC 3.30 (L), HGB 10.1 (L), HCT 30.2 (L), Absolute Lymphocytes 0.7 (L) o/w WNL (WBC 8.6, )  Lactic Acid Whole Blood: 0.7 (N)    Interventions:  2030: 0.9% Sodium Chloride Bolus 1000 ml IV  2130: 0.9% Sodium Chloride Bolus 1000 ml IV  2130: Dilaudid 0.2 mg IV Injection  2137: Tylenol 1000 mg Tablet Oral  2250: Dilaudid 0.2 mg IV Injection  0009: Gadavist 5 ml IV Injection  0037: Toradol 15 mg IV Injection  The patient's symptoms were improved with parenteral narcotics.    Emergency Department Course:  The patient arrived in the emergency department via EMS.  Past medical records, nursing notes, and vitals reviewed.  2115: I performed an exam of the patient and obtained history, as documented above.   IV inserted, urine collected, nasal swabs taken, and blood drawn. The patient was placed on continuous cardiac monitoring and pulse oximetry.  The patient was sent for Chest X-ray and Abdomen/Pelvis CT-Scan while in the emergency department, findings above.  The patient was sent for a Lumbar Spine MRI while in the emergency department, findings above.  0226: I discussed the case with Dr. Cherry of Hospitalist Service regarding the patient.  Findings and plan explained to the Patient who consents to admission.   0229: Discussed the patient with Dr. Cherry of Hospitalist Service, who will admit the patient to a Inpatient bed for further monitoring, evaluation, and treatment.     Impression & Plan       Medical Decision Making:  Parvin Wen is a 88-year-old female who presented to the emergency department with back pain.  Patient complaining of back pain.  She is febrile here in the emergency department.  She did seem to have some abdominal tenderness on exam so did obtain CT abdomen.  No source of fever found on CT abdomen.  Gallbladder distended though no other evidence of cholecystitis. LFTs, lipase are unremarkable. Unlikely gallbladder etiology, on repeat exam now with no abdominal tenderness on RUQ. Chest x-ray showed possible pneumonia.  She does endorse a cough and a recent illness that she saw her primary care doctor for.  MRI spine does not show discitis or epidural abscess.  Shows her known compression fractures.  Patient very uncomfortable on arrival to the emergency department and will need hospitalization for pain control regarding her back.  Will treat for pneumonia as well.  She is not septic with a normal lactate.  Discussed patient with hospitalist for admission.    Diagnosis:    ICD-10-CM    1. Pneumonia of right lung due to infectious organism, unspecified part of lung J18.9    2. Low back pain, unspecified back pain laterality, unspecified chronicity, with sciatica presence unspecified M54.5        Disposition:  Admitted to Inpatient Unit under care of Dr. More of Hospitalist Service       Efra Mason  3/11/2019    EMERGENCY DEPARTMENT  I, Efra Mason, am serving as a scribe at 9:15 PM on 3/11/2019 to document services personally performed by Glendy Johnson MD based on my observations and the provider's statements to me.         Glendy Johnson MD  03/12/19 0354

## 2019-03-12 NOTE — PHARMACY-ADMISSION MEDICATION HISTORY
Admission medication history interview status for the 3/11/2019  admission is complete. See EPIC admission navigator for prior to admission medications     Medication history source reliability:Good    Actions taken by pharmacist (provider contacted, etc):Interviewed patient    Additional medication history information not noted on PTA med list :None    Medication reconciliation/reorder completed by provider prior to medication history? No    Time spent in this activity: 15 minutes    Prior to Admission medications    Medication Sig Last Dose Taking? Auth Provider   ondansetron (ZOFRAN-ODT) 4 MG ODT tab Take 1 tablet (4 mg) by mouth every 6 hours as needed for nausea or vomiting 3/12/2019 at am Yes Soraida Chaudhary PA-C   Acetaminophen (TYLENOL PO) Take 500-1,000 mg by mouth every 8 hours as needed for mild pain or fever 3/10/2019 at pm  Reported, Patient   ALENdronate (FOSAMAX) 70 MG tablet Take 70 mg by mouth every 7 days (Sunday) 3/10/2019 at am  Reported, Patient   AMLODIPINE BESYLATE PO Take 5 mg by mouth every morning  3/10/2019 at pm  Unknown, Entered By History   ASPIRIN EC PO Take 81 mg by mouth every morning 3/10/2019 at am  Reported, Patient   ATORVASTATIN CALCIUM PO Take 40 mg by mouth daily (with dinner)  3/10/2019 at pm  Unknown, Entered By History   BuPROPion HCl (WELLBUTRIN SR PO) Take 150 mg by mouth every twice a day 3/10/2019 at am  Unknown, Entered By History   calcium-vitamin D (CALTRATE) 600-400 MG-UNIT per tablet Take 1 tablet by mouth daily (with dinner)  3/10/2019 at am  Unknown, Entered By History   citalopram (CELEXA) 40 MG tablet Take 40 mg by mouth every morning  3/10/2019 at am  Reported, Patient   Multiple Vitamin (MULTI-VITAMIN PO) Take 1 tablet by mouth daily (with dinner)  3/10/2019 at am  Reported, Patient   omeprazole (PRILOSEC) 40 MG capsule Take 40 mg by mouth every morning  3/10/2019 at am  Reported, Patient   oxybutynin (DITROPAN) 5 MG tablet Take 5 mg by mouth 2 times  daily. 3/10/2019 at pm  Reported, Patient   oxyCODONE IR (ROXICODONE) 5 MG tablet Take 1-2 tablets (5-10 mg) by mouth every 3 hours as needed for moderate to severe pain 3/10/2019 at pm  Soraida Chaudhary PA-C   senna-docusate (SENOKOT-S;PERICOLACE) 8.6-50 MG per tablet Take 1-2 tablets by mouth 2 times daily 2 weeks ago at noon  Soraida Chaudhary PA-C   VITAMIN D, CHOLECALCIFEROL, PO Take 2,000 Units by mouth every morning 3/10/2019 at am  Reported, Patient     Lis Saini D. Student

## 2019-03-12 NOTE — ED NOTES
MRI called again, pt in line but MRI tech states it will be about an hour until she can be scanned.

## 2019-03-12 NOTE — ED NOTES
"[facility]  ED Nurse Handoff Report    ED Chief complaint: Back Pain (chronic back pain- recently admitted to [place] for same problem and prescribed Oxy. Pain has worsened over the past 2-3 days.) and Fever      ED Diagnosis:   Final diagnoses:   None       Code Status: Full Code    Allergies:   Allergies   Allergen Reactions     Propofol      \"I got stiff as a board\"       Activity level - Baseline/Home:  Independent    Activity Level - Current:   Stand with Assist     Needed?: No    Isolation: No  Infection: Not Applicable  Bariatric?: No    Vital Signs:   Vitals:    03/11/19 2139 03/11/19 2157 03/11/19 2259 03/12/19 0040   BP:  142/74     Pulse:  89     Temp:   101.2  F (38.4  C) 99.1  F (37.3  C)   TempSrc:   Oral Oral   SpO2: 94%      Weight:       Height:           Cardiac Rhythm: ,        Pain level: 0-10 Pain Scale: 6    Is this patient confused?: No   Does this patient have a guardian?  No         If yes, is there guardianship documents in the Epic \"Code/ACP\" activity?  N/A         Guardian Notified?  N/A  [place] - Suicide Severity Rating Scale Completed?  Yes  If yes, what color did the patient score?  White    Patient Report: Initial Complaint: back pain / fever  Focused Assessment: 88 year old female with a history of osteoporosis and arthritis who presents with back pain. The patient has encountered fractures to the spine, left femur, and right hip. The patient reports to have developed back pain 5 days ago. She reports to have been recently admitted to [place] for this with CT-Scan revealing no new findings. She was discharged with oxycodone with worsening back pain in the past 3 days, as well as onset of fevers and chills today, prompting her presentation by ambulance. She describes her pain as located to the lower left back with radiation to abdomen. Laying horizontal on left side is most comfortable for the patient, otherwise, she reports increased pain. She " states that she has not had a bowel movement for two days. She denies recent falls, rash, dysuria, urine issues, cough, numbness, and tingling. Of note, the patient's son notes the patient has not been confused and was otherwise healthy and active over the weekend.          Tests Performed: labs, mri  Abnormal Results:   Results for orders placed or performed during the hospital encounter of 03/11/19   CT Abdomen Pelvis w Contrast    Narrative    CT ABDOMEN AND PELVIS WITH CONTRAST   3/11/2019 10:19 PM     HISTORY: Abdominal pain, fever, rule out diverticulitis.    TECHNIQUE: 62 mL Isovue-370. CT images of the abdomen and pelvis  following nonionic intravenous contrast. Radiation dose for this scan  was reduced using automated exposure control, adjustment of the mA  and/or kV according to patient size, or iterative reconstruction  technique.    COMPARISON: None.    FINDINGS:   The liver, spleen, and pancreas are unremarkable. The gallbladder is  distended. The adrenal glands appear normal. There is no  hydronephrosis. No evidence of solid renal mass. The aorta is  atherosclerotic but not aneurysmal. There is severe coronary  atherosclerosis.    No free air or ascites. No evidence of appendicitis or diverticulitis.  No bowel obstruction. There is moderate volume of retained colonic  stool in the ascending colon. No enlarged abdominal or pelvic lymph  nodes. There are bilateral hip replacements. There are multiple lumbar  compression deformities, some of which have undergone vertebroplasty.      Impression    IMPRESSION:  1. Moderate volume of retained colonic stool in the right colon  suggests constipation.  2. Gallbladder distention without other CT evidence of cholecystitis.  3. No cause for fever demonstrated.    MARIO NORMAN MD   XR Chest 1 View    Narrative    XR CHEST ONE VIEW   3/11/2019 9:55 PM     HISTORY: Fever.    COMPARISON: 11/20/2016      Impression    IMPRESSION: There is increased patchy opacity  within the inferomedial  right lung along the right heart border which could represent  aspiration, infection, or atelectasis. Background of perihilar and  basilar opacities are favored to represent a combination of mild  vascular enlargement and atelectasis/chronic scarring, similar  compared to the prior exam. No evidence of effusion. Multiple old  right-sided rib fractures are present. Heart size is unchanged. Mid  thoracic compression fractures and changes of vertebroplasty are  noted.    JAMES ENGLAND MD   Comprehensive metabolic panel   Result Value Ref Range    Sodium 135 133 - 144 mmol/L    Potassium 3.7 3.4 - 5.3 mmol/L    Chloride 104 94 - 109 mmol/L    Carbon Dioxide 24 20 - 32 mmol/L    Anion Gap 7 3 - 14 mmol/L    Glucose 108 (H) 70 - 99 mg/dL    Urea Nitrogen 18 7 - 30 mg/dL    Creatinine 0.87 0.52 - 1.04 mg/dL    GFR Estimate 59 (L) >60 mL/min/[1.73_m2]    GFR Estimate If Black 69 >60 mL/min/[1.73_m2]    Calcium 8.1 (L) 8.5 - 10.1 mg/dL    Bilirubin Total 0.5 0.2 - 1.3 mg/dL    Albumin 3.1 (L) 3.4 - 5.0 g/dL    Protein Total 7.3 6.8 - 8.8 g/dL    Alkaline Phosphatase 88 40 - 150 U/L    ALT 24 0 - 50 U/L    AST 23 0 - 45 U/L   CBC with platelets differential   Result Value Ref Range    WBC 8.6 4.0 - 11.0 10e9/L    RBC Count 3.30 (L) 3.8 - 5.2 10e12/L    Hemoglobin 10.1 (L) 11.7 - 15.7 g/dL    Hematocrit 30.2 (L) 35.0 - 47.0 %    MCV 92 78 - 100 fl    MCH 30.6 26.5 - 33.0 pg    MCHC 33.4 31.5 - 36.5 g/dL    RDW 13.9 10.0 - 15.0 %    Platelet Count 204 150 - 450 10e9/L    Diff Method Automated Method     % Neutrophils 85.7 %    % Lymphocytes 7.6 %    % Monocytes 6.4 %    % Eosinophils 0.1 %    % Basophils 0.1 %    % Immature Granulocytes 0.1 %    Nucleated RBCs 0 0 /100    Absolute Neutrophil 7.3 1.6 - 8.3 10e9/L    Absolute Lymphocytes 0.7 (L) 0.8 - 5.3 10e9/L    Absolute Monocytes 0.6 0.0 - 1.3 10e9/L    Absolute Eosinophils 0.0 0.0 - 0.7 10e9/L    Absolute Basophils 0.0 0.0 - 0.2 10e9/L    Abs  Immature Granulocytes 0.0 0 - 0.4 10e9/L    Absolute Nucleated RBC 0.0    Lactic acid whole blood   Result Value Ref Range    Lactic Acid 0.7 0.7 - 2.0 mmol/L   UA with Microscopic   Result Value Ref Range    Color Urine Yellow     Appearance Urine Clear     Glucose Urine Negative NEG^Negative mg/dL    Bilirubin Urine Negative NEG^Negative    Ketones Urine 10 (A) NEG^Negative mg/dL    Specific Gravity Urine 1.013 1.003 - 1.035    Blood Urine Small (A) NEG^Negative    pH Urine 7.0 5.0 - 7.0 pH    Protein Albumin Urine 10 (A) NEG^Negative mg/dL    Urobilinogen mg/dL Normal 0.0 - 2.0 mg/dL    Nitrite Urine Negative NEG^Negative    Leukocyte Esterase Urine Negative NEG^Negative    Source Catheterized Urine     WBC Urine 1 0 - 5 /HPF    RBC Urine 8 (H) 0 - 2 /HPF    Mucous Urine Present (A) NEG^Negative /LPF   Lipase   Result Value Ref Range    Lipase 177 73 - 393 U/L   Procalcitonin   Result Value Ref Range    Procalcitonin 0.06 ng/ml   Urine Culture   Result Value Ref Range    Specimen Description Catheterized Urine     Special Requests Specimen received in preservative     Culture Micro PENDING    Influenza A/B antigen   Result Value Ref Range    Influenza A/B Agn Specimen Nasopharyngeal     Influenza A Negative NEG^Negative    Influenza B Negative NEG^Negative       Treatments provided: toradol, bolus, dilaudid, zosyn    Family Comments: son has gone home     OBS brochure/video discussed/provided to patient/family: N/A              Name of person given brochure if not patient: xx              Relationship to patient: xx    ED Medications:   Medications   0.9% sodium chloride BOLUS (0 mLs Intravenous Stopped 3/11/19 2125)   acetaminophen (TYLENOL) tablet 1,000 mg (1,000 mg Oral Given 3/11/19 2137)   HYDROmorphone (PF) (DILAUDID) injection 0.2 mg (0.2 mg Intravenous Given 3/11/19 2130)   0.9% sodium chloride BOLUS (0 mLs Intravenous Stopped 3/12/19 0037)   iopamidol (ISOVUE-370) solution 62 mL (62 mLs Intravenous Given  3/11/19 2219)   Saline (60 mLs As instructed Given 3/11/19 2219)   HYDROmorphone (PF) (DILAUDID) injection 0.2 mg (0.2 mg Intravenous Given 3/11/19 2250)   ketorolac (TORADOL) injection 15 mg (15 mg Intravenous Given 3/12/19 0037)   gadobutrol (GADAVIST) injection 5 mL (5 mLs Intravenous Given 3/12/19 0009)       Drips infusing?:  No    For the majority of the shift this patient was Green.   Interventions performed were xx.    Severe Sepsis OR Septic Shock Diagnosis Present: No    To be done/followed up on inpatient unit:  xx    ED NURSE PHONE NUMBER: [id number]

## 2019-03-12 NOTE — CONSULTS
Consult Date:  03/12/2019      INFECTIOUS DISEASE CONSULTATION      REFERRING PHYSICIAN:   Faheem Cherry MD      Room 635      IMPRESSION:   1.  An 88-year-old female with 1 week of worsening low back and left hip area pain, severe, and unrelenting, MRI scan of the back is negative.  CT scan pending from the hip, in light of #2 below.  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   2.  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 #3 below.   3.  Respiratory symptoms, doubt second infection.  If infection in the lungs, likely Staphylococcus aureus also.        RECOMMENDATIONS:   1.  Simplify to Ancef in high doses.   2.  Serial blood cultures until clear.   3.  Await orthopedic evaluation of the hip and pain, obviously hip if hip is infected.  Further intervention will be required.      HISTORY:  This 88-year-old female seen in consultation due to bacteremia.  The patient has a history of a prior left total hip arthroplasty and multiple other joints done in the past.  She has not had that much in the way of major infection problems.  About a week ago started having back and left hip area discomfort, was hospitalized at [place] does not look like there is any fever or pursuit of infection as a cause.  She subsequently became more ill and at admission was found to have possible infiltrates.  Has been on broad antibiotics for pneumonia but now all bottles of all cultures from admission are positive rapidly for Staphylococcus aureus defining the current illness.  She continues to have unrelenting severe low back pain where MRI scan was negative and to my exam, I can barely move the hip, so presumptively hip is infected.      PAST MEDICAL HISTORY:     1.  Multiple joint replacements, none of which have had any infection issues historically   2.  History of left  total hip arthroplasty approximately about a year ago.   3.  Some depression.   4.  History of gastroesophageal reflux disease.   5.  Hypertension.      ALLERGIES:  NO MEDICATION ANTIBIOTIC ALLERGIES.      FAMILY HISTORY:  As noted.      SOCIAL HISTORY:  No recent travel exposures.  No known resistant pathogens.      REVIEW OF SYSTEMS:  Maybe a slight cough and slight respiratory symptoms.  No other focal symptoms besides the pain as described in the HPI.  No other joint discomfort.      PHYSICAL EXAMINATION:   GENERAL:  The patient appears his stated age, maybe slightly confused but reasonable historian in obvious pain.  Cannot move in any fashion, is pointing to her back as the area, but any movement of her hip of any kind is severely painful, more on the left than right.   VITAL SIGNS:  She is afebrile currently, earlier 101.   HEENT:  No thrush or intraoral lesions.  Pupils reactive.   NECK:  Supple and nontender.   HEART:  Slightly tachycardic in the 90s a slight systolic murmur.   LUNGS:  Few crackles at the right base.   ABDOMEN:  Soft and nontender.   EXTREMITIES:  No embolic lesions.  Knees look normal.  Can hardly move her hip but is very hard to assess because she is having severe low back pain, in fact is tender in the low back area.      LABORATORY DATA:  Blood cultures, all bottles, all cultures positive for Staphylococcus aureus.        Chest x-ray shows some possible infiltrates, but not very impressive.  MRI scan of the back was unremarkable.  CT scan of the hip just done.      Thank you very much for consultation.  I will follow the patient with you.         MILAN BROCK MD             D: 2019   T: 2019   MT: SHARATH      Name:     SLOANE VALDEZ   MRN:      [id number]        Account:       [id number]   :      1930           Consult Date:  2019      Document: [id number]       cc: Jac Broussard Jr, MD

## 2019-03-12 NOTE — CONSULTS
ID consult dictataed IMp 1 89 yo female acute back, ? L hip pain, now fever and has MSSA bacteremia    REC, doubt second pathogen, to ancef ,serial Bc, in light of + Bc high level worry about hip, await ortho

## 2019-03-12 NOTE — PROVIDER NOTIFICATION
MD Notification    Notified Person: MD    Notified Person Name: Eloisa    Notification Date/Time: 3/12/19 4752    Notification Interaction: web-paged    Purpose of Notification: FYI Cultures from L arm identified as Staph Aureus    Orders Received: ID consulted, repeat BC's.    Comments:

## 2019-03-12 NOTE — PLAN OF CARE
A+Ox4, up with 1 assist. Up in chair for meals. Tolerating regular diet, good appetite. PRN oxy x1  and scheduled tylenol for pain, with relief. LS clear, diminished. IV anx. 1L O2, unable to wean. Positive BC's, temps 101.9/101.7 gave PRN Tylenol. Other VSS. Ortho consulted. Nursing will continue to monitor.

## 2019-03-12 NOTE — H&P
[facility]    History and Physical - Hospitalist Service       Date of Admission:  3/11/2019    Assessment & Plan   Parvin Wen is a 88 year old female admitted on 3/11/2019 for lower back pain and pneumonia.    1.) Left Lower Back and Hip Pain  - She was due to see Dr. Fernández with [facility] this Wednesday, hip did a left sided hip replacement a year ago  - recent hospitalization at [place] from 3/6-3/8 for pain, was sent home with oxycodone  - for tonight, continue PTA oxycodone, add IV dilaudid for breakthrough  - MRI of Lumbar spine doesn't show anything new, no sign of discitis  - will consult Ortho to see the patient, make therapy and pain recommendations, defer further imaging of the hip to their service    2.) Fever, Possible Health Care Associated Pneumonia  - She was noted to be 102.8F on presentation  - she didn't come in for SOB or cough, but admitted that she has had both since going home from [place], mild non-productive cough  - initially didn't need O2 but after taking IV dilaudid she dropped  - CXR shows possible infiltrate vs atelecatsis in the RML  - started on IV Zosyn and Zithromax in ER, will continue these on admission and check procalcitonin  - workup for left hip issues as above, doubt septic joint  - MRI Spine negative for discitis    3.) HTN  - a little high tonight, likely effected by pain  - continue PTA norvasc  - PRN IV labetalol and hydralazine for SBP>180    4.) Depression  - mood is stable  - continue PTA Celexa and Wellbutrin    5.) HLP  - PTA Lipitor    6.) GERD  - PTA PPI     Diet: Regular  DVT Prophylaxis: Pneumatic Compression Devices  Wilson Catheter: not present  Code Status: FULL    Disposition Plan   Expected discharge: 2 - 3 days, recommended to prior living arrangement once adequate pain management/ tolerating PO medications, antibiotic plan established, O2 use less than 1-2 liters/minute and safe disposition plan/ TCU bed available.  Entered:  Faheem Cherry MD 03/12/2019, 2:48 AM     The patient's care was discussed with the Patient.    Faheem Cherry MD  [facility]    ______________________________________________________________________    Chief Complaint   Lower Back and Left Hip Pain    History is obtained from the patient, electronic health record and emergency department physician    History of Present Illness   Parvin Wen is a 88 year old female who has PMH of chronic back and hip pain presents tonight with recent worsening of her chronic pain.  She was admitted to [facility] from 3/6-3/8/19 and sent home with new script for oxycodone.  She says Dr. Murphy from [facility] replaced both hips and she has an appointment to see him at clinic this Wednesday but just couldn't wait.  When she arrived she had imaging of her spin and pelvis, no new fractures noted.  The patient had some falls years ago and has had pain issues since.  She was noted to be febrile to 102.8F on presentation, her lactic acid and WBC are both normal.  CXR shows something in the RML region, infiltrate vs atelectasis.  She says she has had a cough, SOB and sore throat since leaving [facility].  Denies a productive cough or chest pain.  Says she hasnt noted fever up to this point.  Admission was requested for faillure of OP pain control and possible HCAP.    Review of Systems    The 10 point Review of Systems is negative other than noted in the HPI    Past Medical History    I have reviewed this patient's medical history and updated it with pertinent information if needed.   Past Medical History:   Diagnosis Date     Arthritis      Compression fracture of spine (H)      Depression      Dysphagia      Gastro-oesophageal reflux disease      Hiatal hernia      Hyperlipidemia      Hypertension      Osteoporosis        Past Surgical History   I have reviewed this patient's surgical history and updated it with pertinent information if  needed.  Past Surgical History:   Procedure Laterality Date     ARTHROPLASTY HIP  11/10/2011    Procedure:ARTHROPLASTY HIP; RIGHT TOTAL HIP ARTHROPLASTY (BIOMET)^; Surgeon:CHIRAG IBRAHIM; Location: OR     ARTHROPLASTY HIP Left 12/11/2017    Procedure: ARTHROPLASTY HIP;;  Surgeon: Chirag Ibrahim MD;  Location:  OR     GI SURGERY      EGD     GYN SURGERY      hyst     OPEN REDUCTION INTERNAL FIXATION HIP NAILING Left 11/21/2016    Procedure: OPEN REDUCTION INTERNAL FIXATION HIP NAILING;  Surgeon: Maldonado Koo MD;  Location:  OR     ORTHOPEDIC SURGERY      TKA     REMOVE HARDWARE HIP NAILING Left 12/11/2017    Procedure: REMOVE HARDWARE HIP NAILING;  LEFT REMOVAL OF HIP HARDWARE  AND LEFT TOTAL HIP ARTHROPLASTY ;  Surgeon: Chirag Ibrahim MD;  Location:  OR       Social History   I have reviewed this patient's social history and updated it with pertinent information if needed.  Social History     Tobacco Use     Smoking status: Never Smoker     Smokeless tobacco: Never Used   Substance Use Topics     Alcohol use: No     Frequency: Never     Drug use: No       Family History   I have reviewed this patient's family history with her and it is non-contributory.    Prior to Admission Medications   Prior to Admission Medications   Prescriptions Last Dose Informant Patient Reported? Taking?   ALENdronate (FOSAMAX) 70 MG tablet  Self Yes No   Sig: Take 70 mg by mouth every 7 days (Sunday)   AMLODIPINE BESYLATE PO  Self Yes No   Sig: Take 5 mg by mouth every morning    ASPIRIN EC PO  Self Yes No   Sig: Take 81 mg by mouth every morning   ATORVASTATIN CALCIUM PO  Self Yes No   Sig: Take 40 mg by mouth daily (with dinner)    Acetaminophen (TYLENOL PO)  Self Yes No   Sig: Take 500-1,000 mg by mouth every 8 hours as needed for mild pain or fever   BuPROPion HCl (WELLBUTRIN SR PO)  Self Yes No   Sig: Take 150 mg by mouth every morning    Multiple Vitamin (MULTI-VITAMIN PO)  Self  "Yes No   Sig: Take 1 tablet by mouth daily (with dinner)    VITAMIN D, CHOLECALCIFEROL, PO  Self Yes No   Sig: Take 2,000 Units by mouth every morning   calcium-vitamin D (CALTRATE) 600-400 MG-UNIT per tablet  Self Yes No   Sig: Take 1 tablet by mouth daily (with dinner)    citalopram (CELEXA) 40 MG tablet  Self Yes No   Sig: Take 40 mg by mouth every morning    omeprazole (PRILOSEC) 40 MG capsule  Self Yes No   Sig: Take 40 mg by mouth every morning    ondansetron (ZOFRAN-ODT) 4 MG ODT tab   No No   Sig: Take 1 tablet (4 mg) by mouth every 6 hours as needed for nausea or vomiting   oxyCODONE IR (ROXICODONE) 5 MG tablet   No No   Sig: Take 1-2 tablets (5-10 mg) by mouth every 3 hours as needed for moderate to severe pain   oxybutynin (DITROPAN) 5 MG tablet  Self Yes No   Sig: Take 5 mg by mouth 2 times daily.   senna-docusate (SENOKOT-S;PERICOLACE) 8.6-50 MG per tablet   No No   Sig: Take 1-2 tablets by mouth 2 times daily      Facility-Administered Medications: None     Allergies   Allergies   Allergen Reactions     Propofol      \"I got stiff as a board\"       Physical Exam   Vital Signs: Temp: 99.1  F (37.3  C) Temp src: Oral BP: 142/74 Pulse: 89     SpO2: 94 % O2 Device: None (Room air)    Weight: 123 lbs 0 oz    Constitutional: awake, alert, cooperative, no apparent distress, and appears stated age  Hematologic / Lymphatic: no cervical lymphadenopathy, no supraclavicular lymphadenopathy and no axillary lymphadenopathy  Cardiovascular: Normal apical impulse, regular rate and rhythm, normal S1 and S2, no S3 or S4, and no murmur noted  GI: No scars, normal bowel sounds, soft, non-distended, non-tender, no masses palpated, no hepatosplenomegally  Skin: no bruising or bleeding, normal skin color, texture, turgor, no redness, warmth, or swelling, no rashes, no lesions and no jaundice  Neurologic: Awake, alert, oriented to name, place and time.  Cranial nerves II-XII are grossly intact.  Motor is 5 out of 5 " bilaterally.  Cerebellar finger to nose, heel to shin intact.  Sensory is intact.  Babinski down going, Romberg negative, and gait is normal.    Data   Data reviewed today: I reviewed all medications, new labs and imaging results over the last 24 hours. I personally reviewed no images or EKG's today.    Recent Labs   Lab 03/11/19 2010   WBC 8.6   HGB 10.1*   MCV 92         POTASSIUM 3.7   CHLORIDE 104   CO2 24   BUN 18   CR 0.87   ANIONGAP 7   MISSY 8.1*   *   ALBUMIN 3.1*   PROTTOTAL 7.3   BILITOTAL 0.5   ALKPHOS 88   ALT 24   AST 23   LIPASE 177     Recent Results (from the past 24 hour(s))   XR Chest 1 View    Narrative    XR CHEST ONE VIEW   3/11/2019 9:55 PM     HISTORY: Fever.    COMPARISON: 11/20/2016      Impression    IMPRESSION: There is increased patchy opacity within the inferomedial  right lung along the right heart border which could represent  aspiration, infection, or atelectasis. Background of perihilar and  basilar opacities are favored to represent a combination of mild  vascular enlargement and atelectasis/chronic scarring, similar  compared to the prior exam. No evidence of effusion. Multiple old  right-sided rib fractures are present. Heart size is unchanged. Mid  thoracic compression fractures and changes of vertebroplasty are  noted.    JAMES ENGLAND MD   CT Abdomen Pelvis w Contrast    Narrative    CT ABDOMEN AND PELVIS WITH CONTRAST   3/11/2019 10:19 PM     HISTORY: Abdominal pain, fever, rule out diverticulitis.    TECHNIQUE: 62 mL Isovue-370. CT images of the abdomen and pelvis  following nonionic intravenous contrast. Radiation dose for this scan  was reduced using automated exposure control, adjustment of the mA  and/or kV according to patient size, or iterative reconstruction  technique.    COMPARISON: None.    FINDINGS:   The liver, spleen, and pancreas are unremarkable. The gallbladder is  distended. The adrenal glands appear normal. There is no  hydronephrosis.  No evidence of solid renal mass. The aorta is  atherosclerotic but not aneurysmal. There is severe coronary  atherosclerosis.    No free air or ascites. No evidence of appendicitis or diverticulitis.  No bowel obstruction. There is moderate volume of retained colonic  stool in the ascending colon. No enlarged abdominal or pelvic lymph  nodes. There are bilateral hip replacements. There are multiple lumbar  compression deformities, some of which have undergone vertebroplasty.      Impression    IMPRESSION:  1. Moderate volume of retained colonic stool in the right colon  suggests constipation.  2. Gallbladder distention without other CT evidence of cholecystitis.  3. No cause for fever demonstrated.    MARIO NORMAN MD

## 2019-03-13 ENCOUNTER — APPOINTMENT (OUTPATIENT)
Dept: GENERAL RADIOLOGY | Facility: CLINIC | Age: 84
DRG: 560 | End: 2019-03-13
Attending: PHYSICIAN ASSISTANT
Payer: MEDICARE

## 2019-03-13 ENCOUNTER — APPOINTMENT (OUTPATIENT)
Dept: SPEECH THERAPY | Facility: CLINIC | Age: 84
DRG: 560 | End: 2019-03-13
Attending: INTERNAL MEDICINE
Payer: MEDICARE

## 2019-03-13 LAB
ANION GAP SERPL CALCULATED.3IONS-SCNC: 7 MMOL/L (ref 3–14)
APPEARANCE FLD: NORMAL
BASOPHILS # BLD AUTO: 0 10E9/L (ref 0–0.2)
BASOPHILS NFR BLD AUTO: 0.2 %
BUN SERPL-MCNC: 18 MG/DL (ref 7–30)
CALCIUM SERPL-MCNC: 8 MG/DL (ref 8.5–10.1)
CHLORIDE SERPL-SCNC: 106 MMOL/L (ref 94–109)
CO2 SERPL-SCNC: 23 MMOL/L (ref 20–32)
COLOR FLD: NORMAL
CREAT SERPL-MCNC: 0.91 MG/DL (ref 0.52–1.04)
CRP SERPL-MCNC: 142 MG/L (ref 0–8)
CRYSTALS SNV MICRO: NORMAL
DIFFERENTIAL METHOD BLD: ABNORMAL
EOSINOPHIL # BLD AUTO: 0 10E9/L (ref 0–0.7)
EOSINOPHIL NFR BLD AUTO: 0.3 %
ERYTHROCYTE [DISTWIDTH] IN BLOOD BY AUTOMATED COUNT: 14.2 % (ref 10–15)
ERYTHROCYTE [SEDIMENTATION RATE] IN BLOOD BY WESTERGREN METHOD: 69 MM/H (ref 0–30)
GFR SERPL CREATININE-BSD FRML MDRD: 56 ML/MIN/{1.73_M2}
GLUCOSE SERPL-MCNC: 105 MG/DL (ref 70–99)
GRAM STN SPEC: NORMAL
HCT VFR BLD AUTO: 28.3 % (ref 35–47)
HGB BLD-MCNC: 9.3 G/DL (ref 11.7–15.7)
IMM GRANULOCYTES # BLD: 0 10E9/L (ref 0–0.4)
IMM GRANULOCYTES NFR BLD: 0.3 %
LYMPHOCYTES # BLD AUTO: 0.5 10E9/L (ref 0.8–5.3)
LYMPHOCYTES NFR BLD AUTO: 8.3 %
LYMPHOCYTES NFR FLD MANUAL: 32 %
MCH RBC QN AUTO: 30.6 PG (ref 26.5–33)
MCHC RBC AUTO-ENTMCNC: 32.9 G/DL (ref 31.5–36.5)
MCV RBC AUTO: 93 FL (ref 78–100)
MONOCYTES # BLD AUTO: 0.5 10E9/L (ref 0–1.3)
MONOCYTES NFR BLD AUTO: 8.1 %
MONOS+MACROS NFR FLD MANUAL: 5 %
NEUTROPHILS # BLD AUTO: 5.3 10E9/L (ref 1.6–8.3)
NEUTROPHILS NFR BLD AUTO: 82.8 %
NEUTS BAND NFR FLD MANUAL: 59 %
NRBC # BLD AUTO: 0 10*3/UL
NRBC BLD AUTO-RTO: 0 /100
OTHER CELLS FLD MANUAL: 4 %
PLATELET # BLD AUTO: 162 10E9/L (ref 150–450)
POTASSIUM SERPL-SCNC: 3.6 MMOL/L (ref 3.4–5.3)
RBC # BLD AUTO: 3.04 10E12/L (ref 3.8–5.2)
SODIUM SERPL-SCNC: 136 MMOL/L (ref 133–144)
SPECIMEN SOURCE FLD: NORMAL
SPECIMEN SOURCE: NORMAL
WBC # BLD AUTO: 6.4 10E9/L (ref 4–11)
WBC # FLD AUTO: 582 /UL

## 2019-03-13 PROCEDURE — 87070 CULTURE OTHR SPECIMN AEROBIC: CPT | Performed by: PHYSICIAN ASSISTANT

## 2019-03-13 PROCEDURE — 25000132 ZZH RX MED GY IP 250 OP 250 PS 637: Mod: GY | Performed by: INTERNAL MEDICINE

## 2019-03-13 PROCEDURE — 87015 SPECIMEN INFECT AGNT CONCNTJ: CPT | Performed by: PHYSICIAN ASSISTANT

## 2019-03-13 PROCEDURE — 25000128 H RX IP 250 OP 636: Performed by: INTERNAL MEDICINE

## 2019-03-13 PROCEDURE — 99233 SBSQ HOSP IP/OBS HIGH 50: CPT | Performed by: INTERNAL MEDICINE

## 2019-03-13 PROCEDURE — A9270 NON-COVERED ITEM OR SERVICE: HCPCS | Mod: GY | Performed by: INTERNAL MEDICINE

## 2019-03-13 PROCEDURE — 20610 DRAIN/INJ JOINT/BURSA W/O US: CPT | Mod: LT

## 2019-03-13 PROCEDURE — 87205 SMEAR GRAM STAIN: CPT | Performed by: PHYSICIAN ASSISTANT

## 2019-03-13 PROCEDURE — 89051 BODY FLUID CELL COUNT: CPT | Performed by: PHYSICIAN ASSISTANT

## 2019-03-13 PROCEDURE — 86140 C-REACTIVE PROTEIN: CPT | Performed by: INTERNAL MEDICINE

## 2019-03-13 PROCEDURE — 87040 BLOOD CULTURE FOR BACTERIA: CPT | Performed by: INTERNAL MEDICINE

## 2019-03-13 PROCEDURE — 80048 BASIC METABOLIC PNL TOTAL CA: CPT | Performed by: INTERNAL MEDICINE

## 2019-03-13 PROCEDURE — 85025 COMPLETE CBC W/AUTO DIFF WBC: CPT | Performed by: INTERNAL MEDICINE

## 2019-03-13 PROCEDURE — 36415 COLL VENOUS BLD VENIPUNCTURE: CPT | Performed by: INTERNAL MEDICINE

## 2019-03-13 PROCEDURE — 12000000 ZZH R&B MED SURG/OB

## 2019-03-13 PROCEDURE — 85652 RBC SED RATE AUTOMATED: CPT | Performed by: INTERNAL MEDICINE

## 2019-03-13 PROCEDURE — 25000128 H RX IP 250 OP 636: Performed by: PHYSICIAN ASSISTANT

## 2019-03-13 PROCEDURE — 25000125 ZZHC RX 250: Performed by: RADIOLOGY

## 2019-03-13 PROCEDURE — 89060 EXAM SYNOVIAL FLUID CRYSTALS: CPT | Performed by: PHYSICIAN ASSISTANT

## 2019-03-13 PROCEDURE — 0S9B3ZZ DRAINAGE OF LEFT HIP JOINT, PERCUTANEOUS APPROACH: ICD-10-PCS | Performed by: RADIOLOGY

## 2019-03-13 PROCEDURE — 92610 EVALUATE SWALLOWING FUNCTION: CPT | Mod: GN | Performed by: SPEECH-LANGUAGE PATHOLOGIST

## 2019-03-13 RX ORDER — DEXTROSE MONOHYDRATE 25 G/50ML
25-50 INJECTION, SOLUTION INTRAVENOUS
Status: DISCONTINUED | OUTPATIENT
Start: 2019-03-13 | End: 2019-03-18 | Stop reason: HOSPADM

## 2019-03-13 RX ORDER — KETOROLAC TROMETHAMINE 15 MG/ML
15 INJECTION, SOLUTION INTRAMUSCULAR; INTRAVENOUS EVERY 6 HOURS
Status: ACTIVE | OUTPATIENT
Start: 2019-03-13 | End: 2019-03-13

## 2019-03-13 RX ORDER — NICOTINE POLACRILEX 4 MG
15-30 LOZENGE BUCCAL
Status: DISCONTINUED | OUTPATIENT
Start: 2019-03-13 | End: 2019-03-18 | Stop reason: HOSPADM

## 2019-03-13 RX ORDER — METHOCARBAMOL 500 MG/1
500 TABLET, FILM COATED ORAL 4 TIMES DAILY PRN
Status: DISCONTINUED | OUTPATIENT
Start: 2019-03-13 | End: 2019-03-18 | Stop reason: HOSPADM

## 2019-03-13 RX ORDER — HYDROMORPHONE HYDROCHLORIDE 1 MG/ML
.3-.5 INJECTION, SOLUTION INTRAMUSCULAR; INTRAVENOUS; SUBCUTANEOUS
Status: DISCONTINUED | OUTPATIENT
Start: 2019-03-13 | End: 2019-03-18 | Stop reason: HOSPADM

## 2019-03-13 RX ADMIN — MULTIPLE VITAMINS W/ MINERALS TAB 1 TABLET: TAB at 08:00

## 2019-03-13 RX ADMIN — VITAMIN D, TAB 1000IU (100/BT) 2000 UNITS: 25 TAB at 08:00

## 2019-03-13 RX ADMIN — OXYCODONE HYDROCHLORIDE 5 MG: 5 TABLET ORAL at 07:54

## 2019-03-13 RX ADMIN — AMLODIPINE BESYLATE 5 MG: 5 TABLET ORAL at 08:00

## 2019-03-13 RX ADMIN — ACETAMINOPHEN 1000 MG: 500 TABLET, FILM COATED ORAL at 13:59

## 2019-03-13 RX ADMIN — LIDOCAINE HYDROCHLORIDE 3 ML: 10 INJECTION, SOLUTION EPIDURAL; INFILTRATION; INTRACAUDAL; PERINEURAL at 15:23

## 2019-03-13 RX ADMIN — Medication 0.5 MG: at 14:33

## 2019-03-13 RX ADMIN — SENNOSIDES 2 TABLET: 8.6 TABLET, FILM COATED ORAL at 21:47

## 2019-03-13 RX ADMIN — CITALOPRAM HYDROBROMIDE 40 MG: 40 TABLET ORAL at 08:00

## 2019-03-13 RX ADMIN — CEFAZOLIN SODIUM 2 G: 2 INJECTION, SOLUTION INTRAVENOUS at 16:31

## 2019-03-13 RX ADMIN — CEFAZOLIN SODIUM 2 G: 2 INJECTION, SOLUTION INTRAVENOUS at 06:03

## 2019-03-13 RX ADMIN — SENNOSIDES 2 TABLET: 8.6 TABLET, FILM COATED ORAL at 08:00

## 2019-03-13 RX ADMIN — ASPIRIN 81 MG: 81 TABLET, COATED ORAL at 08:00

## 2019-03-13 RX ADMIN — ATORVASTATIN CALCIUM 40 MG: 40 TABLET, FILM COATED ORAL at 16:31

## 2019-03-13 RX ADMIN — OXYCODONE HYDROCHLORIDE 5 MG: 5 TABLET ORAL at 19:13

## 2019-03-13 RX ADMIN — ACETAMINOPHEN 1000 MG: 500 TABLET, FILM COATED ORAL at 21:48

## 2019-03-13 RX ADMIN — Medication 1 TABLET: at 16:31

## 2019-03-13 RX ADMIN — Medication 0.2 MG: at 06:37

## 2019-03-13 RX ADMIN — OXYBUTYNIN CHLORIDE 5 MG: 5 TABLET ORAL at 21:47

## 2019-03-13 RX ADMIN — OXYBUTYNIN CHLORIDE 5 MG: 5 TABLET ORAL at 08:00

## 2019-03-13 RX ADMIN — CEFAZOLIN SODIUM 2 G: 2 INJECTION, SOLUTION INTRAVENOUS at 23:55

## 2019-03-13 RX ADMIN — ACETAMINOPHEN 1000 MG: 500 TABLET, FILM COATED ORAL at 05:58

## 2019-03-13 RX ADMIN — BUPROPION HYDROCHLORIDE 150 MG: 150 TABLET, EXTENDED RELEASE ORAL at 08:00

## 2019-03-13 RX ADMIN — OXYCODONE HYDROCHLORIDE 5 MG: 5 TABLET ORAL at 11:46

## 2019-03-13 RX ADMIN — OMEPRAZOLE 40 MG: 20 CAPSULE, DELAYED RELEASE ORAL at 08:00

## 2019-03-13 RX ADMIN — POLYETHYLENE GLYCOL 3350 17 G: 17 POWDER, FOR SOLUTION ORAL at 08:01

## 2019-03-13 ASSESSMENT — ACTIVITIES OF DAILY LIVING (ADL)
ADLS_ACUITY_SCORE: 16
ADLS_ACUITY_SCORE: 22
ADLS_ACUITY_SCORE: 22
ADLS_ACUITY_SCORE: 16
ADLS_ACUITY_SCORE: 22
ADLS_ACUITY_SCORE: 19

## 2019-03-13 NOTE — CONSULTS
[facility]    Orthopedic Consultation    Parvin Wen MRN# [id number]   Age: 88 year old YOB: 1930     Date of Admission:  3/11/2019    Reason for consult: Left hip replacement with increasing pain       Requesting physician: Dr. Faheem Cherry       Level of consult: Consult, follow and place orders           Assessment and Plan:   Assessment:   Concern for left septic total hip arthroplasty  + Blood cultures - Staph Aureus   Lumbar spine with no new dx, stable and chronic appearing compression fractures      Plan:   Plan for IR aspiration of the left MONA with cell count with differential, gram stain, culture, and crystals  I have upped IV dilaudid and added in robaxin and Toradol for better pain control  Bed rest at this time  Discussed with Dr. Murphy           Chief Complaint:   Left hip/low back pain         History of Present Illness:   This patient is a 88 year old female who presents with the following condition requiring a hospital admission:    Patient has had a week history of increasing low back (left) and left hip pain. She states that she has not had any recent trauma. She had been seen at [facility] and CT was done showing compression fractures. She returned to [facility] ED on 3/11/19 and at that time she stated her back pain had worsened even though she was taking oxycodone, muscle relaxers, and lidocaine patches. She also had a fever at that time.  She was discharged to home at that time because she was also having URI symptoms which they felt contributed to her fever. Patient was seen again yesterday evening in ED with continued worsening of symptoms. She underwent a MRI here lastnight which showed that the compression fractures are chronic and not acute and no explanation of spine etiology of her symptoms. During this time her BC also came back +. She had an appointment today with Dr. Murphy as this has been her primary orthopedic surgeon for bilateral  "THAs. There is concern at this time by Hospitalist, ID, and Ortho that the hip may be septic.          Past Medical History:     Past Medical History:   Diagnosis Date     Arthritis      Compression fracture of spine (H)      Depression      Dysphagia      Gastro-oesophageal reflux disease      Hiatal hernia      Hyperlipidemia      Hypertension      Osteoporosis              Past Surgical History:     Past Surgical History:   Procedure Laterality Date     ARTHROPLASTY HIP  11/10/2011    Procedure:ARTHROPLASTY HIP; RIGHT TOTAL HIP ARTHROPLASTY (BIOMET)^; Surgeon:CHIRAG IBRAHIM; Location: OR     ARTHROPLASTY HIP Left 12/11/2017    Procedure: ARTHROPLASTY HIP;;  Surgeon: Chirag Ibrahim MD;  Location:  OR     GI SURGERY      EGD     GYN SURGERY      hyst     OPEN REDUCTION INTERNAL FIXATION HIP NAILING Left 11/21/2016    Procedure: OPEN REDUCTION INTERNAL FIXATION HIP NAILING;  Surgeon: Maldonado Koo MD;  Location:  OR     ORTHOPEDIC SURGERY      TKA     REMOVE HARDWARE HIP NAILING Left 12/11/2017    Procedure: REMOVE HARDWARE HIP NAILING;  LEFT REMOVAL OF HIP HARDWARE  AND LEFT TOTAL HIP ARTHROPLASTY ;  Surgeon: Chirag Ibrahim MD;  Location:  OR             Social History:     Social History     Tobacco Use     Smoking status: Never Smoker     Smokeless tobacco: Never Used   Substance Use Topics     Alcohol use: No     Frequency: Never             Family History:   History reviewed. No pertinent family history.          Immunizations:     VACCINE/DOSE   Diptheria   DPT   DTAP   HBIG   Hepatitis A   Hepatitis B   HIB   Influenza   Measles   Meningococcal   MMR   Mumps   Pneumococcal   Polio   Rubella   Small Pox   TDAP   Varicella   Zoster             Allergies:     Allergies   Allergen Reactions     Propofol      \"I got stiff as a board\"             Medications:     Current Facility-Administered Medications   Medication     acetaminophen (TYLENOL) Suppository 650 mg "     acetaminophen (TYLENOL) tablet 1,000 mg     amLODIPine (NORVASC) tablet 5 mg     aspirin EC tablet 81 mg     atorvastatin (LIPITOR) tablet 40 mg     buPROPion (WELLBUTRIN SR) 12 hr tablet 150 mg     calcium carbonate 600 mg-vitamin D 400 units (CALTRATE) per tablet 1 tablet     ceFAZolin (ANCEF) intermittent infusion 2 g in 100 mL dextrose PRE-MIX     citalopram (celeXA) tablet 40 mg     HYDROmorphone (PF) (DILAUDID) injection 0.2 mg     ipratropium - albuterol 0.5 mg/2.5 mg/3 mL (DUONEB) neb solution 3 mL     melatonin tablet 1 mg     multivitamin w/minerals (THERA-VIT-M) tablet 1 tablet     naloxone (NARCAN) injection 0.1-0.4 mg     omeprazole (priLOSEC) CR capsule 40 mg     ondansetron (ZOFRAN-ODT) ODT tab 4 mg     oxybutynin (DITROPAN) tablet 5 mg     oxyCODONE (ROXICODONE) tablet 5-10 mg     polyethylene glycol (MIRALAX/GLYCOLAX) Packet 17 g     sennosides (SENOKOT) tablet 2 tablet     vitamin D3 (CHOLECALCIFEROL) 1000 units (25 mcg) tablet 2,000 Units             Review of Systems:     CV: NEGATIVE for chest pain, palpitations or peripheral edema  C: NEGATIVE  chills, change in weight POSITIVE for recent fever  E/M: NEGATIVE for ear, mouth and throat problems  R: POSITIVE for cough           Physical Exam:   All vitals have been reviewed  Patient Vitals for the past 24 hrs:   BP Temp Temp src Pulse Resp SpO2   03/13/19 1130 -- -- -- -- -- 94 %   03/13/19 0839 -- -- -- -- 18 --   03/13/19 0810 -- -- -- -- -- 92 %   03/13/19 0754 -- -- -- -- 16 --   03/13/19 0735 120/60 99.2  F (37.3  C) Oral 74 16 93 %   03/13/19 0620 -- -- -- -- 20 --   03/13/19 0558 -- -- -- -- 20 94 %   03/13/19 0116 119/62 99  F (37.2  C) Oral 76 18 93 %   03/12/19 2300 -- 101.5  F (38.6  C) Oral -- -- --   03/12/19 2114 -- -- -- -- -- 92 %   03/12/19 2039 -- 102.9  F (39.4  C) Oral -- -- 92 %   03/12/19 1732 -- -- -- -- 18 --   03/12/19 1653 -- -- -- -- 20 --   03/12/19 1531 101/57 99.7  F (37.6  C) Oral 79 16 91 %   03/12/19 1427 --  -- -- -- 16 --       Intake/Output Summary (Last 24 hours) at 3/13/2019 1356  Last data filed at 3/13/2019 1000  Gross per 24 hour   Intake 530 ml   Output 550 ml   Net -20 ml     Exam deferred as patient was having severe hip and back spasms from pain and simply breathing  Minimal erythema of the surrounding skin.   Bilateral calves are soft, non-tender.  Bilateral lower extremity is NVI.  Sensation intact bilateral lower extremities  Active dorsi and plantar flexion bilaterally  +Dp pulse          Data:   All laboratory data reviewed  Results for orders placed or performed during the hospital encounter of 03/11/19   CT Abdomen Pelvis w Contrast    Narrative    CT ABDOMEN AND PELVIS WITH CONTRAST   3/11/2019 10:19 PM     HISTORY: Abdominal pain, fever, rule out diverticulitis.    TECHNIQUE: 62 mL Isovue-370. CT images of the abdomen and pelvis  following nonionic intravenous contrast. Radiation dose for this scan  was reduced using automated exposure control, adjustment of the mA  and/or kV according to patient size, or iterative reconstruction  technique.    COMPARISON: None.    FINDINGS:   The liver, spleen, and pancreas are unremarkable. The gallbladder is  distended. The adrenal glands appear normal. There is no  hydronephrosis. No evidence of solid renal mass. The aorta is  atherosclerotic but not aneurysmal. There is severe coronary  atherosclerosis.    No free air or ascites. No evidence of appendicitis or diverticulitis.  No bowel obstruction. There is moderate volume of retained colonic  stool in the ascending colon. No enlarged abdominal or pelvic lymph  nodes. There are bilateral hip replacements. There are multiple lumbar  compression deformities, some of which have undergone vertebroplasty.      Impression    IMPRESSION:  1. Moderate volume of retained colonic stool in the right colon  suggests constipation.  2. Gallbladder distention without other CT evidence of cholecystitis.  3. No cause for fever  demonstrated.    MARIO NORMAN MD   XR Chest 1 View    Narrative    XR CHEST ONE VIEW   3/11/2019 9:55 PM     HISTORY: Fever.    COMPARISON: 11/20/2016      Impression    IMPRESSION: There is increased patchy opacity within the inferomedial  right lung along the right heart border which could represent  aspiration, infection, or atelectasis. Background of perihilar and  basilar opacities are favored to represent a combination of mild  vascular enlargement and atelectasis/chronic scarring, similar  compared to the prior exam. No evidence of effusion. Multiple old  right-sided rib fractures are present. Heart size is unchanged. Mid  thoracic compression fractures and changes of vertebroplasty are  noted.    JAMES ENGLAND MD   Lumbar spine MRI w & w/o contrast - surgery <10yrs    Narrative    MRI OF THE LUMBAR SPINE WITHOUT AND WITH CONTRAST  3/12/2019 12:20 AM     HISTORY:  Back pain, fever. Rule out disc, infection.    TECHNIQUE: Multiplanar, multisequence MRI images of the lumbar spine  were acquired without and with 5 mL Gadavist IV contrast.    COMPARISON: Lumbar spine CT scan 5/30/2012.    FINDINGS: There are five lumbar-type vertebrae for the purposes of  this dictation.     Compression deformities of all five lumbar vertebral bodies again  noted without definite change. Vertebroplasty changes in the L3 and L4  vertebral bodies again noted. There is no bone marrow edema in any of  the lumbar vertebral bodies to suggest recent or progressing fracture  abnormality. Alignment of the lumbar vertebrae remains normal. There  is no abnormal contrast enhancement in any of the lumbar vertebrae.    There is posterior disc bulging/herniation to varying degrees at all  levels of the lumbar spine. There is no abnormal contrast enhancement  in any of the lumbar intervertebral discs.    The tip of the conus medullaris is at the mid L2 level which is within  normal limits. There is no evidence for intrathecal abnormality.  There  is no abnormal intrathecal contrast enhancement.    Level by level:     L1-L2: There is a circumferential disc bulge and mild facet  arthropathy bilaterally. There is mild bilateral neural foraminal  narrowing and minimal spinal canal narrowing.    L2-L3: There is a circumferential disc bulge, thickening of the  ligamentum flavum bilaterally and mild facet arthropathy bilaterally.  These findings result in mild-moderate spinal canal narrowing,  mild-moderate right foraminal narrowing and mild left foraminal  narrowing.    L3-L4: There is a circumferential disc bulge, mild thickening of the  ligamentum flavum bilaterally and mild facet arthropathy bilaterally.  These findings result in mild-moderate bilateral neural foraminal  narrowing and mild spinal narrowing.    L4-L5: There is a circumferential disc bulge, circumferential endplate  spurring, thickening of the ligamentum flavum bilaterally and moderate  facet arthropathy bilaterally. These findings result in moderate  spinal canal stenosis, mild-moderate left foraminal narrowing and mild  right foraminal narrowing.    L5-S1: There is a circumferential disc bulge, moderate facet  arthropathy bilaterally and mild thickening of the ligamentum flavum  bilaterally. These findings result in moderate bilateral neural  foraminal stenosis but no significant spinal canal narrowing.      Impression    IMPRESSION:  1. Chronic compression deformities of all five lumbar vertebral bodies  again noted without definite change. There is no bone marrow edema in  any of the lumbar vertebrae to suggest recent or progressing  compression fractures.  2. Diffuse degenerative changes of the lumbar spine as described  above.  3. No findings to suggest discitis.    CHERRY DUMAS MD   CT Hip Left w/o & w Contrast    Narrative    CT HIP LEFT WITH CONTRAST 3/12/2019 4:19 PM     HISTORY: Hip pain and MSSA bacteremia.    CONTRAST DOSE:  62 mL Isovue-370    Radiation dose for this scan  was reduced using automated exposure  control, adjustment of the mA and/or kV according to patient size, or  iterative reconstruction technique.    FINDINGS:  Bilateral total hip arthroplasties are present with  associated metallic streak/beam hardening artifact associated with the  implants. This limits evaluation of soft tissues, particularly  immediately adjacent to the metal implants. With this limitation, no  soft tissue fluid collection or abnormal rim enhancement is visible.  Although there may be mild left femoral diaphyseal anterolateral  cortical thinning adjacent to the tip of the femoral stem, there is no  apparent periprosthetic osseous destruction or lucency indicative of  prosthetic loosening. There is mild deformity of the left inferior and  superior pubic rami compatible with old healed fractures. Degenerative  changes and chondrocalcinosis are noted at the pubis symphysis.  Advanced degenerative changes are noted in the lower lumbar spine with  at least mild central stenosis at the presumed L4-5 level, not  optimally evaluated with this scan. Extensive atherosclerotic  calcification is noted in the femoral arteries.      Impression    IMPRESSION:  1. Bilateral total hip arthroplasty without CT evidence of  complication. Although metallic artifact limits evaluation of the  immediately adjacent soft tissues, no soft tissue fluid collection or  abnormal rim enhancement are demonstrated.  2. Left superior/inferior pubic rami healed fractures.  3. Pubis symphysis chondrocalcinosis and degenerative changes.  4. Extensive atherosclerotic calcification.  5. Lower lumbar spine degenerative changes with at least mild central  stenosis at the L4-5 level, not optimally evaluated with this scan.    DARRON ALFREDO MD   Comprehensive metabolic panel   Result Value Ref Range    Sodium 135 133 - 144 mmol/L    Potassium 3.7 3.4 - 5.3 mmol/L    Chloride 104 94 - 109 mmol/L    Carbon Dioxide 24 20 - 32 mmol/L    Anion  Gap 7 3 - 14 mmol/L    Glucose 108 (H) 70 - 99 mg/dL    Urea Nitrogen 18 7 - 30 mg/dL    Creatinine 0.87 0.52 - 1.04 mg/dL    GFR Estimate 59 (L) >60 mL/min/[1.73_m2]    GFR Estimate If Black 69 >60 mL/min/[1.73_m2]    Calcium 8.1 (L) 8.5 - 10.1 mg/dL    Bilirubin Total 0.5 0.2 - 1.3 mg/dL    Albumin 3.1 (L) 3.4 - 5.0 g/dL    Protein Total 7.3 6.8 - 8.8 g/dL    Alkaline Phosphatase 88 40 - 150 U/L    ALT 24 0 - 50 U/L    AST 23 0 - 45 U/L   CBC with platelets differential   Result Value Ref Range    WBC 8.6 4.0 - 11.0 10e9/L    RBC Count 3.30 (L) 3.8 - 5.2 10e12/L    Hemoglobin 10.1 (L) 11.7 - 15.7 g/dL    Hematocrit 30.2 (L) 35.0 - 47.0 %    MCV 92 78 - 100 fl    MCH 30.6 26.5 - 33.0 pg    MCHC 33.4 31.5 - 36.5 g/dL    RDW 13.9 10.0 - 15.0 %    Platelet Count 204 150 - 450 10e9/L    Diff Method Automated Method     % Neutrophils 85.7 %    % Lymphocytes 7.6 %    % Monocytes 6.4 %    % Eosinophils 0.1 %    % Basophils 0.1 %    % Immature Granulocytes 0.1 %    Nucleated RBCs 0 0 /100    Absolute Neutrophil 7.3 1.6 - 8.3 10e9/L    Absolute Lymphocytes 0.7 (L) 0.8 - 5.3 10e9/L    Absolute Monocytes 0.6 0.0 - 1.3 10e9/L    Absolute Eosinophils 0.0 0.0 - 0.7 10e9/L    Absolute Basophils 0.0 0.0 - 0.2 10e9/L    Abs Immature Granulocytes 0.0 0 - 0.4 10e9/L    Absolute Nucleated RBC 0.0    Lactic acid whole blood   Result Value Ref Range    Lactic Acid 0.7 0.7 - 2.0 mmol/L   UA with Microscopic   Result Value Ref Range    Color Urine Yellow     Appearance Urine Clear     Glucose Urine Negative NEG^Negative mg/dL    Bilirubin Urine Negative NEG^Negative    Ketones Urine 10 (A) NEG^Negative mg/dL    Specific Gravity Urine 1.013 1.003 - 1.035    Blood Urine Small (A) NEG^Negative    pH Urine 7.0 5.0 - 7.0 pH    Protein Albumin Urine 10 (A) NEG^Negative mg/dL    Urobilinogen mg/dL Normal 0.0 - 2.0 mg/dL    Nitrite Urine Negative NEG^Negative    Leukocyte Esterase Urine Negative NEG^Negative    Source Catheterized Urine     WBC  Urine 1 0 - 5 /HPF    RBC Urine 8 (H) 0 - 2 /HPF    Mucous Urine Present (A) NEG^Negative /LPF   Lipase   Result Value Ref Range    Lipase 177 73 - 393 U/L   Procalcitonin   Result Value Ref Range    Procalcitonin 0.06 ng/ml   Procalcitonin   Result Value Ref Range    Procalcitonin 0.19 ng/ml   Basic metabolic panel   Result Value Ref Range    Sodium 136 133 - 144 mmol/L    Potassium 3.6 3.4 - 5.3 mmol/L    Chloride 106 94 - 109 mmol/L    Carbon Dioxide 23 20 - 32 mmol/L    Anion Gap 7 3 - 14 mmol/L    Glucose 105 (H) 70 - 99 mg/dL    Urea Nitrogen 18 7 - 30 mg/dL    Creatinine 0.91 0.52 - 1.04 mg/dL    GFR Estimate 56 (L) >60 mL/min/[1.73_m2]    GFR Estimate If Black 65 >60 mL/min/[1.73_m2]    Calcium 8.0 (L) 8.5 - 10.1 mg/dL   CBC with platelets differential   Result Value Ref Range    WBC 6.4 4.0 - 11.0 10e9/L    RBC Count 3.04 (L) 3.8 - 5.2 10e12/L    Hemoglobin 9.3 (L) 11.7 - 15.7 g/dL    Hematocrit 28.3 (L) 35.0 - 47.0 %    MCV 93 78 - 100 fl    MCH 30.6 26.5 - 33.0 pg    MCHC 32.9 31.5 - 36.5 g/dL    RDW 14.2 10.0 - 15.0 %    Platelet Count 162 150 - 450 10e9/L    Diff Method Automated Method     % Neutrophils 82.8 %    % Lymphocytes 8.3 %    % Monocytes 8.1 %    % Eosinophils 0.3 %    % Basophils 0.2 %    % Immature Granulocytes 0.3 %    Nucleated RBCs 0 0 /100    Absolute Neutrophil 5.3 1.6 - 8.3 10e9/L    Absolute Lymphocytes 0.5 (L) 0.8 - 5.3 10e9/L    Absolute Monocytes 0.5 0.0 - 1.3 10e9/L    Absolute Eosinophils 0.0 0.0 - 0.7 10e9/L    Absolute Basophils 0.0 0.0 - 0.2 10e9/L    Abs Immature Granulocytes 0.0 0 - 0.4 10e9/L    Absolute Nucleated RBC 0.0    CRP inflammation   Result Value Ref Range    CRP Inflammation 142.0 (H) 0.0 - 8.0 mg/L   Erythrocyte sedimentation rate auto   Result Value Ref Range    Sed Rate 69 (H) 0 - 30 mm/h   Care Transition RN/SW IP Consult    Narrative    Jenny Fleming RN     3/12/2019  3:50 PM  Care Transition Initial Assessment - RN        Met with: Patient.  DATA    Active Problems:    Lower back pain       Cognitive Status: awake, alert and oriented.        Contact information and PCP information verified: Yes  Lives With: alone      Insurance concerns: No Insurance issues identified  ASSESSMENT  Patient currently receives the following services:  NA        Identified issues/concerns regarding health management: In   discussion with pt, she exhibits pretty severe back pain   depending how she is lying.  Pt was at the [facility]   under Observation status 3/6-3/8.  That hospital is closest to   her home.  They had recommended a TCU.  Pt was not able to afford   the cost of this.  Pt wanted to come to [facility] due to having Dr Murphy do orthopedic surgeries in the past.  Pt feels her back   pain is much worse than when she presented to [facility] last   week.  Pt resides in her own home.  She does drive, but has not   attempted this over the Winter months.  Her laundry is in the   basement.  Pt has five children that she noted are   supportiveAwaiting Orthopedic consult.  Pt will most likely be   here awhile due to bacteremia.  Pt is open to transitioning to a TCU when she is medically   stable, and would like to go to the facility in [place], where   she has been before.    Care  (CTS) will continue to follow as   needed.           Urine Culture   Result Value Ref Range    Specimen Description Catheterized Urine     Special Requests Specimen received in preservative     Culture Micro No growth    Influenza A/B antigen   Result Value Ref Range    Influenza A/B Agn Specimen Nasopharyngeal     Influenza A Negative NEG^Negative    Influenza B Negative NEG^Negative   Blood culture   Result Value Ref Range    Specimen Description Blood Left Arm     Special Requests Aerobic and anaerobic bottles received     Culture Micro (A)      Cultured on the 1st day of incubation:  Staphylococcus aureus      Culture Micro       Critical Value/Significant Value,  preliminary result only, called to and read back by  Sonya Waite RN from [facility] 6 on 3.12.19 at 1128 by SS.      Culture Micro Susceptibility testing in progress     Culture Micro       (Note)  POSITIVE for STAPHYLOCOCCUS AUREUS and NEGATIVE for the mecA gene  (not MRSA) by Verigene multiplex nucleic acid test. The mecA gene was  not detected. Final identification and antimicrobial susceptibility  testing will be verified by standard methods.    Specimen tested with Verigene multiplex, gram-positive blood culture  nucleic acid test for the following targets: Staph aureus, Staph  epidermidis, Staph lugdunensis, other Staph species, Enterococcus  faecalis, Enterococcus faecium, Streptococcus species, S. agalactiae,  S. anginosus grp., S. pneumoniae, S. pyogenes, Listeria sp., mecA  (methicillin resistance) and Noble/B (vancomycin resistance).    Critical Value/Significant Value called to and read back by ricardo waite rn @0624 3/12/19. scg       Blood culture   Result Value Ref Range    Specimen Description Blood Left Arm     Special Requests Aerobic and anaerobic bottles received     Culture Micro (A)      Cultured on the 1st day of incubation:  Staphylococcus aureus      Culture Micro       Critical Value/Significant Value, preliminary result only, called to and read back by  Sonya Waite RN [id number] @ 0332 3.12.19 JE      Culture Micro Susceptibility testing done on previous specimen    Blood culture   Result Value Ref Range    Specimen Description Blood Left Arm     Special Requests Received in aerobic bottle only     Culture Micro No growth after 10 hours    Blood culture   Result Value Ref Range    Specimen Description Blood Right Arm     Special Requests Aerobic and anaerobic bottles received     Culture Micro No growth after 10 hours    Blood culture   Result Value Ref Range    Specimen Description Blood Right Arm     Special Requests Aerobic and anaerobic bottles received     Culture Micro PENDING            Attestation:  I have reviewed today's vital signs, notes, medications, labs and imaging with Dr. Murphy.  Amount of time performed on this consult: 25 minutes.    Rima Daly PA-C

## 2019-03-13 NOTE — PROGRESS NOTES
03/13/19 1013   General Information   Onset Date 03/11/19   Start of Care Date 03/13/19   Referring Physician Dr. Amin   Patient Profile Review/OT: Additional Occupational Profile Info See Profile for full history and prior level of function   Patient/Family Goals Statement To continue on a regular diet.    Swallowing Evaluation Bedside swallow evaluation   Behaviorial Observations Alert   Mode of current nutrition Oral diet   Type of oral diet Regular;Thin liquid   Respiratory Status O2 Supply   Type of O2 supply Nasal cannula   Comments Parvin Wen is a 88 year old female admitted on 3/11/2019 for lower back pain and pneumonia.   Clinical Swallow Evaluation   Oral Musculature generally intact   Structural Abnormalities none present   Dentition upper and lower dentures   Mucosal Quality dry   Mandibular Strength and Mobility intact   Oral Labial Strength and Mobility WFL   Lingual Strength and Mobility WFL   Velar Elevation intact   Buccal Strength and Mobility intact   Laryngeal Function Cough;Throat clear;Swallow;Voicing initiated;Dry swallow palpated  (Delayed and decreased elevation. )   Swallow Eval   Feeding Assistance no assistance needed   Clinical Swallow Eval: Thin Liquid Texture Trial   Mode of Presentation, Thin Liquids cup;straw;self-fed   Volume of Liquid or Food Presented 4 oz of coffee   Oral Phase of Swallow Premature pharyngeal entry   Pharyngeal Phase of Swallow impaired;reduction in laryngeal movement;repeated swallows   Diagnostic Statement Premature entry and reduced laryngeal elevation. Suspect possible penetration via the straw. Tolerated by the cup.    Clinical Swallow Eval: Puree Solid Texture Trial   Mode of Presentation, Puree spoon;self-fed   Volume of Puree Presented 2 teaspoons apple sauce   Oral Phase, Puree WFL   Pharyngeal Phase, Puree impaired;reduction in laryngeal movement;repeated swallows   Clinical Swallow Eval: Solid Food Texture Trial   Mode of Presentation,  Solid self-fed   Volume of Solid Food Presented 1/2 piece of toast with peanut butter and few grapes   Oral Phase, Solid Poor AP movement;Premature pharyngeal entry   Pharyngeal Phase, Solid impaired;reduction in laryngeal movement;repeated swallows;throat clearing   Diagnostic Statement Throat clear x1 when masticating and talking.    Swallow Compensations   Swallow Compensations Alternate viscosity of consistencies;Pacing;Reduce amounts;Multiple swallow   Results Suspect silent aspiration;Oral difficulties only   Esophageal Phase of Swallow   Patient reports or presents with symptoms of esophageal dysphagia Yes   Esophageal comments Long standing history since 2015 she has large volume of reflux, poor motility and a small Zenker's.    Swallow Eval: Clinical Impressions   Skilled Criteria for Therapy Intervention No problems identified which require skilled intervention   Functional Assessment Scale (FAS) 5   Treatment Diagnosis Mild oral and pharyngeal dysphagia with esophageal dysfunction history   Diet texture recommendations Regular diet;Thin liquids  (Soft moist foods)   Recommended Feeding/Eating Techniques alternate between small bites and sips of food/liquid;check mouth frequently for oral residue/pocketing;maintain upright posture during/after eating for 30 mins;no straws;small sips/bites   Therapy Frequency (Evaluation only)   Anticipated Discharge Disposition inpatient rehabilitation facility   Risks and Benefits of Treatment have been explained. Yes   Patient, family and/or staff in agreement with Plan of Care Yes   Clinical Impression Comments Patient presents with minimal to mild oral and pharyngeal dysphagia at bedside, coupled with a significant history for esophageal dysfunction for poor motility and large amount of reflux with a small Zenker's diverticulum 2015. She was upright in the chair eating breakfast and aware of the GERDS precautions except for the use of a straw. She demonstrated  premature entry of thin liquids and suspect penetration via the straw. No difficulty when swallowing via the cup. Mastication was prolonged for a solid and needs some liquids to assist in moistening dry foods (toast with peanut butter). Clearing oral cavity with liquid rinse and multiple swallows. Throat clear noted x1 when talking and chewing at the same time. Re-education provided at bedside on swallow precautions and selecting soft moist foods. Recommend: 1. May continue on a regular diet and thin liquids. 2. Up in a chair for all meals and 60 minutes after. No straws, small bites/sips and alternate liquids/solids.    Total Evaluation Time   Total Evaluation Time (Minutes) 25

## 2019-03-13 NOTE — PLAN OF CARE
Patient is A&Ox4.  Up with 1 and gait belt. Tolerating regular diet.  PRN Dilaudid x1 for L hip pain. LS clear, diminished. IV antibiotics. Temps 99.7, 102.9 Tylenol given, 101.5. 94% on  4L O2 Oxy mask, MD notified. PRN nebs ordered.  ID following. Ortho consulted. Will continue to monitor.

## 2019-03-13 NOTE — PROGRESS NOTES
SW:  D: Per chart review, CTS consult has been completed. Per CC, Pt is open to transitioning to a TCU when she is medically stable, and would like to go to the facility in [place], where she has been before.  [facility] has completed chart review and determined that pt has previously been at [facility]. Per MD note, pt may be ready for discharge Friday vs Saturday. [facility] has sent referral to [facility]. [facility] will follow up with pt to confirm this TCU location and transportation plans.    P: [facility] will continue to follow for discharge planning.    ELIAZAR Casanova

## 2019-03-13 NOTE — PROGRESS NOTES
MD Notification    Notified Person: MD Shepherd    Notified Person Name: MD Shepherd    Notification Date/Time:3/12/19 at 2052    Notification Interaction:Telephone    Purpose of Notification: Elevated temp and respiratory status    Orders Received: Order placed for neb and Tylenol suppository      Comments:

## 2019-03-13 NOTE — PROGRESS NOTES
RADIOLOGY PROCEDURE NOTE  Patient name: Parvin Wen  MRN: [id number]  : 1930    Pre-procedure diagnosis: left hip pain, bacteremia, s/p left MONA  Post-procedure diagnosis: Same    Procedure Date/Time: 2019  3:43 PM  Procedure: left hip aspiration  Estimated blood loss: None  Specimen(s) collected with description: 1.5 mL blood tinged synovial fluid  The patient tolerated the procedure well with no immediate complications.  Significant findings:none    See imaging dictation for procedural details.    Provider name: Sunitha Morin PA-C and Dr Mckeon  Assistant(s):None

## 2019-03-13 NOTE — PLAN OF CARE
A&Ox4. VSS on 3L O2 oxymask. Up A1. Tolerating Reg diet but poor appetite. Continent overnight. C/o L hip pain, given scheduled tylenol and dilaudid x1. IV SL. Continue to monitor and address pain.

## 2019-03-13 NOTE — PLAN OF CARE
Pt is A&Ox4.  Up with assit of 1 and gait belt. VSS on 4L of oxygen via nc, satting 92%-95%. Encouraged use of IS and deep breathing. LS diminished, coarse at bases. Poor appetite.Tolerating regular diet. Per speech no straw with fluids intake. C/o of back pain, managed with oxycodone PO and IV Dilaudid. Pt will continue on IV  antibiotics. Pt is incontinent at times. Up to [facility] to urinate, bladder scanned for 235 ml this afternoon, minimal urinal output. MD aware. ID is following. Ortho consulted. Will continue to monitor.

## 2019-03-13 NOTE — PROGRESS NOTES
[facility]  Hospitalist Progress Note    Assessment & Plan   Parvin Wen is a very pleasant 88 year old female with hypertension, depression, anxiety, dyslipidemia, GERD who was admitted on 3/11/2019 with intractable low back pain and suspected septic left MONA.    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  Recent hospitalization at [facility] from 3/6 through 3/8 for pain and was sent home with oxycodone.  MRI of lumbar spine with no evidence for discitis.  -continue ceftriaxone per infectious disease recommendations  -Today going for aspiration of left MONA and interventional radiology  -appreciate further orthopedics recommendations  -Increased IV Dilaudid and added Robaxin and Toradol per orthopedic recommendations  -Bedrest activity at this time  -Follow-up culture and Gram stain from aspiration  -Bowel regimen    Hypertension  Normotensive at present  -Continue PTA Norvasc 5 mg daily  -PRN IV labetalol and hydralazine for systolic blood pressure greater than 180    Dyslipidemia  -Continue aspirin 81 mg daily, statin    Depression, anxiety  -Continue PTA Wellbutrin, Celexa    GERD  -Continue PTA PPI    Chronic urinary incontinence  -Continue PTA oxybutynin    Orders Placed This Encounter      Combination Diet Regular Diet Adult    DVT prophylaxis: Pneumatic Compression Devices and Ambulate every shift  Code Status: Full Code    Disposition: Expected discharge unknown.  Likely 3-4 more days given probable septic hip, need for long-term IV antibiotics if this is the case.     Christine Burrows MD  [phone] (7am - 6pm)  Text Page  ~~~~~~~~~~~~~~~~~~~~~~~~~~~~~~~~~~~~~~~~~~~~~~~  Interval History   Seen earlier today and patient miserable with pain.  Went for IR aspiration of hip this  afternoon.    -Data reviewed today: I reviewed all new labs and imaging results over the last 24 hours.     Physical Exam   Temp: 99.2  F (37.3  C) Temp src: Oral BP: 120/60 Pulse: 74   Resp: 18 SpO2: 94 % O2 Device: Nasal cannula with humidification Oxygen Delivery: 2 LPM  Vitals:    03/11/19 1937 03/12/19 0353   Weight: 55.8 kg (123 lb) 57.1 kg (125 lb 14.1 oz)     Vital Signs with Ranges  Temp:  [99  F (37.2  C)-102.9  F (39.4  C)] 99.2  F (37.3  C)  Pulse:  [74-76] 74  Resp:  [16-20] 18  BP: (119-120)/(60-62) 120/60  SpO2:  [92 %-94 %] 94 %  I/O last 3 completed shifts:  In: 530 [P.O.:530]  Out: 610 [Urine:610]    Constitutional: Writhing in pain, alert  HEENT: mmm, sclerae anicteric  Respiratory: Lungs CTAB, no wheezes or crackles  Cardiovascular: RRR, no murmurs  No LE edema  GI: soft, non-tender, nondistended  Skin/Integument: warm, dry, no acute rashes    Medications       acetaminophen  1,000 mg Oral Q8H     amLODIPine  5 mg Oral QAM     aspirin  81 mg Oral QAM     atorvastatin  40 mg Oral Daily with supper     buPROPion  150 mg Oral QAM     calcium carbonate 600 mg-vitamin D 400 units  1 tablet Oral Daily with supper     ceFAZolin  2 g Intravenous Q8H     citalopram  40 mg Oral QAM     ketorolac  15 mg Intravenous Q6H     multivitamin w/minerals  1 tablet Oral Daily     omeprazole  40 mg Oral QAM     oxybutynin  5 mg Oral BID     polyethylene glycol  17 g Oral Daily     sennosides  2 tablet Oral BID     vitamin D3  2,000 Units Oral QAM       Data   Recent Labs   Lab 03/13/19 0919 03/11/19 2010   WBC 6.4 8.6   HGB 9.3* 10.1*   MCV 93 92    204    135   POTASSIUM 3.6 3.7   CHLORIDE 106 104   CO2 23 24   BUN 18 18   CR 0.91 0.87   ANIONGAP 7 7   MISSY 8.0* 8.1*   * 108*   ALBUMIN  --  3.1*   PROTTOTAL  --  7.3   BILITOTAL  --  0.5   ALKPHOS  --  88   ALT  --  24   AST  --  23   LIPASE  --  177       Imaging:  Recent Results (from the past 24 hour(s))   CT Hip Left w/o & w Contrast     Narrative    CT HIP LEFT WITH CONTRAST 3/12/2019 4:19 PM     HISTORY: Hip pain and MSSA bacteremia.    CONTRAST DOSE:  62 mL Isovue-370    Radiation dose for this scan was reduced using automated exposure  control, adjustment of the mA and/or kV according to patient size, or  iterative reconstruction technique.    FINDINGS:  Bilateral total hip arthroplasties are present with  associated metallic streak/beam hardening artifact associated with the  implants. This limits evaluation of soft tissues, particularly  immediately adjacent to the metal implants. With this limitation, no  soft tissue fluid collection or abnormal rim enhancement is visible.  Although there may be mild left femoral diaphyseal anterolateral  cortical thinning adjacent to the tip of the femoral stem, there is no  apparent periprosthetic osseous destruction or lucency indicative of  prosthetic loosening. There is mild deformity of the left inferior and  superior pubic rami compatible with old healed fractures. Degenerative  changes and chondrocalcinosis are noted at the pubis symphysis.  Advanced degenerative changes are noted in the lower lumbar spine with  at least mild central stenosis at the presumed L4-5 level, not  optimally evaluated with this scan. Extensive atherosclerotic  calcification is noted in the femoral arteries.      Impression    IMPRESSION:  1. Bilateral total hip arthroplasty without CT evidence of  complication. Although metallic artifact limits evaluation of the  immediately adjacent soft tissues, no soft tissue fluid collection or  abnormal rim enhancement are demonstrated.  2. Left superior/inferior pubic rami healed fractures.  3. Pubis symphysis chondrocalcinosis and degenerative changes.  4. Extensive atherosclerotic calcification.  5. Lower lumbar spine degenerative changes with at least mild central  stenosis at the L4-5 level, not optimally evaluated with this scan.    DARRON ALFREDO MD   XR Joint Aspiration Major  Left    Narrative    EXAM: Fluoroscopic guided hip aspiration   3/13/19       History:  Left MONA suspect septic MONA..     Operators:  MD Sunitha Cisse    PROCEDURE: The risks (including bleeding, infection, and allergy to  contrast and medications) and benefits of the procedure were explained  to the patient and consent was obtained.  Using sterile technique and  fluoroscopic guidance, a #20 gauge needle was placed into the left hip  joint using an anterior approach.  Proximally 0.5 cc serous fluid was  aspirated.  Estimated blood loss during the procedure was less than 5  mL. No initial complication. Fluid sent to lab for analysis.      Fluoroscopy time: .1  Images Obtained: 1  Medications used: 3mL Local Lidocaine 1%.       Impression    IMPRESSION:  Technically successful left hip aspiration.    PATRICK PERALES MD

## 2019-03-13 NOTE — PLAN OF CARE
Discharge Planner SLP   Patient plan for discharge: Patient did not state.   Current status: Bedside swallow evaluation completed.  Patient presents with minimal to mild oral and pharyngeal dysphagia at bedside, coupled with a significant history for esophageal dysfunction for poor motility and large amount of reflux with a small Zenker's diverticulum 2015. She was upright in the chair eating breakfast and aware of the GERDS precautions except for the use of a straw. She demonstrated premature entry of thin liquids and suspect penetration via the straw. No difficulty when swallowing via the cup. Mastication was prolonged for a solid and needs some liquids to assist in moistening dry foods (toast with peanut butter). Clearing oral cavity with liquid rinse and multiple swallows. Throat clear noted x1 when talking and chewing at the same time. Re-education provided at bedside on swallow precautions and selecting soft moist foods. Recommend: 1. May continue on a regular diet and thin liquids. 2. Up in a chair for all meals and 60 minutes after. No straws, small bites/sips and alternate liquids/solids. No further skilled intervention is indicated at this time.   Barriers to return to prior living situation: Deconditioning  Recommendations for discharge: Per medical team.   Rationale for recommendations: No further skilled intervention at this time will complete the orders.        Entered by: Cait Peter 03/13/2019 10:37 AM

## 2019-03-13 NOTE — PROGRESS NOTES
"[facility]  Infectious Disease Progress Note          Assessment and Plan:   IMPRESSION:   1.  An 88-year-old female with 1 week of worsening low back and left hip area pain, severe, and unrelenting, MRI scan of the back is negative.  CT scan pending from the hip, in light of #2 below.  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   2.  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 #3 below.   3.  Respiratory symptoms, doubt second infection.  If infection in the lungs, likely Staphylococcus aureus also.        RECOMMENDATIONS:   1.  Continue Ancef   2.  Serial blood cultures until clear.   3.  Await orthopedic evaluation of the hip and pain, obviously if hip is infected Further intervention will be required. Atypical localizing sxs but now that we know bacteremic suspicion level is high            Interval History:   no new complaints and a bit better pain control; no cp, sob, n/v/d, or abd pain. T still 101, cognition nl              Medications:       acetaminophen  1,000 mg Oral Q8H     amLODIPine  5 mg Oral QAM     aspirin  81 mg Oral QAM     atorvastatin  40 mg Oral Daily with supper     buPROPion  150 mg Oral QAM     calcium carbonate 600 mg-vitamin D 400 units  1 tablet Oral Daily with supper     ceFAZolin  2 g Intravenous Q8H     citalopram  40 mg Oral QAM     multivitamin w/minerals  1 tablet Oral Daily     omeprazole  40 mg Oral QAM     oxybutynin  5 mg Oral BID     polyethylene glycol  17 g Oral Daily     sennosides  2 tablet Oral BID     vitamin D3  2,000 Units Oral QAM                  Physical Exam:   Blood pressure 120/60, pulse 74, temperature 99.2  F (37.3  C), temperature source Oral, resp. rate 18, height 1.448 m (4' 9.01\"), weight 57.1 kg (125 lb 14.1 oz), SpO2 94 %.  Wt Readings from Last 2 Encounters:   03/12/19 57.1 kg " (125 lb 14.1 oz)   12/11/17 54.4 kg (120 lb)     Vital Signs with Ranges  Temp:  [98.5  F (36.9  C)-102.9  F (39.4  C)] 99.2  F (37.3  C)  Pulse:  [74-79] 74  Resp:  [16-20] 18  BP: (101-120)/(57-62) 120/60  SpO2:  [91 %-94 %] 94 %    Constitutional: Awake, alert, cooperative, no apparent distress   Lungs: Clear to auscultation bilaterally, no crackles or wheezing   Cardiovascular: Regular rate and rhythm, normal S1 and S2, and no murmur noted   Abdomen: Normal bowel sounds, soft, non-distended, non-tender   Skin: No rashes, no cyanosis, no edema pain with any movement ? Back but hip movement also an issue   Other:           Data:   All microbiology laboratory data reviewed.  Recent Labs   Lab Test 03/13/19 0919 03/11/19 2010 12/14/17  0640  11/24/16  0556   WBC 6.4 8.6  --   --  4.8   HGB 9.3* 10.1* 8.5*   < > 8.4*   HCT 28.3* 30.2*  --   --  25.3*   MCV 93 92  --   --  89    204 156   < > 114*    < > = values in this interval not displayed.     Recent Labs   Lab Test 03/13/19  0919 03/11/19 2010 12/14/17  0640   CR 0.91 0.87 0.90     Recent Labs   Lab Test 03/13/19  0919   SED 69*     Recent Labs   Lab Test 03/12/19  1540 03/12/19  1535 03/11/19  2200 03/11/19  2045 03/11/19 2010 06/20/11  1850 06/20/11  1120 06/20/11  1115   CULT No growth after 10 hours No growth after 10 hours Cultured on the 1st day of incubation:  Staphylococcus aureus  *  Critical Value/Significant Value, preliminary result only, called to and read back by  Sonya Waite RN [id number] @ 1318 3.12.19 JE    Susceptibility testing done on previous specimen No growth Cultured on the 1st day of incubation:  Staphylococcus aureus  *  Critical Value/Significant Value, preliminary result only, called to and read back by  Sonya Waite RN from [facility] MS 6 on 3.12.19 at 1128 by SS.    Susceptibility testing in progress  (Note)  POSITIVE for STAPHYLOCOCCUS AUREUS and NEGATIVE for the mecA gene  (not MRSA) by [employer] nucleic acid  test. The mecA gene was  not detected. Final identification and antimicrobial susceptibility  testing will be verified by standard methods.    Specimen tested with [employer] multiplex, gram-positive blood culture  nucleic acid test for the following targets: Staph aureus, Staph  epidermidis, Staph lugdunensis, other Staph species, Enterococcus  faecalis, Enterococcus faecium, Streptococcus species, S. agalactiae,  S. anginosus grp., S. pneumoniae, S. pyogenes, Listeria sp., mecA  (methicillin resistance) and Noble/B (vancomycin resistance).    Critical Value/Significant Value called to and read back by ricardo trotter rn @8658 3/12/19. scg     No growth No growth after 6 days No growth after 6 days

## 2019-03-14 ENCOUNTER — APPOINTMENT (OUTPATIENT)
Dept: CARDIOLOGY | Facility: CLINIC | Age: 84
DRG: 560 | End: 2019-03-14
Attending: INTERNAL MEDICINE
Payer: MEDICARE

## 2019-03-14 LAB
BACTERIA SPEC CULT: ABNORMAL
CREAT SERPL-MCNC: 0.9 MG/DL (ref 0.52–1.04)
GFR SERPL CREATININE-BSD FRML MDRD: 57 ML/MIN/{1.73_M2}
Lab: ABNORMAL
Lab: ABNORMAL
SPECIMEN SOURCE: ABNORMAL
SPECIMEN SOURCE: ABNORMAL

## 2019-03-14 PROCEDURE — A9270 NON-COVERED ITEM OR SERVICE: HCPCS | Mod: GY | Performed by: INTERNAL MEDICINE

## 2019-03-14 PROCEDURE — 25000128 H RX IP 250 OP 636: Performed by: INTERNAL MEDICINE

## 2019-03-14 PROCEDURE — 93306 TTE W/DOPPLER COMPLETE: CPT | Mod: 26 | Performed by: INTERNAL MEDICINE

## 2019-03-14 PROCEDURE — 93306 TTE W/DOPPLER COMPLETE: CPT

## 2019-03-14 PROCEDURE — 82565 ASSAY OF CREATININE: CPT | Performed by: INTERNAL MEDICINE

## 2019-03-14 PROCEDURE — 36415 COLL VENOUS BLD VENIPUNCTURE: CPT | Performed by: INTERNAL MEDICINE

## 2019-03-14 PROCEDURE — 87040 BLOOD CULTURE FOR BACTERIA: CPT | Performed by: INTERNAL MEDICINE

## 2019-03-14 PROCEDURE — 25000132 ZZH RX MED GY IP 250 OP 250 PS 637: Mod: GY | Performed by: INTERNAL MEDICINE

## 2019-03-14 PROCEDURE — 12000000 ZZH R&B MED SURG/OB

## 2019-03-14 PROCEDURE — 99233 SBSQ HOSP IP/OBS HIGH 50: CPT | Performed by: INTERNAL MEDICINE

## 2019-03-14 RX ORDER — POLYETHYLENE GLYCOL 3350 17 G/17G
17 POWDER, FOR SOLUTION ORAL 2 TIMES DAILY
Status: DISCONTINUED | OUTPATIENT
Start: 2019-03-14 | End: 2019-03-18 | Stop reason: HOSPADM

## 2019-03-14 RX ADMIN — MULTIPLE VITAMINS W/ MINERALS TAB 1 TABLET: TAB at 08:58

## 2019-03-14 RX ADMIN — OXYCODONE HYDROCHLORIDE 10 MG: 5 TABLET ORAL at 11:58

## 2019-03-14 RX ADMIN — VITAMIN D, TAB 1000IU (100/BT) 2000 UNITS: 25 TAB at 08:58

## 2019-03-14 RX ADMIN — OXYBUTYNIN CHLORIDE 5 MG: 5 TABLET ORAL at 08:58

## 2019-03-14 RX ADMIN — SENNOSIDES 2 TABLET: 8.6 TABLET, FILM COATED ORAL at 20:44

## 2019-03-14 RX ADMIN — OXYBUTYNIN CHLORIDE 5 MG: 5 TABLET ORAL at 20:44

## 2019-03-14 RX ADMIN — SENNOSIDES 2 TABLET: 8.6 TABLET, FILM COATED ORAL at 08:58

## 2019-03-14 RX ADMIN — CEFAZOLIN SODIUM 2 G: 2 INJECTION, SOLUTION INTRAVENOUS at 08:57

## 2019-03-14 RX ADMIN — OXYCODONE HYDROCHLORIDE 10 MG: 5 TABLET ORAL at 22:39

## 2019-03-14 RX ADMIN — ASPIRIN 81 MG: 81 TABLET, COATED ORAL at 08:58

## 2019-03-14 RX ADMIN — Medication 1 TABLET: at 16:35

## 2019-03-14 RX ADMIN — OXYCODONE HYDROCHLORIDE 10 MG: 5 TABLET ORAL at 01:25

## 2019-03-14 RX ADMIN — ACETAMINOPHEN 1000 MG: 500 TABLET, FILM COATED ORAL at 20:44

## 2019-03-14 RX ADMIN — CITALOPRAM HYDROBROMIDE 40 MG: 40 TABLET ORAL at 08:59

## 2019-03-14 RX ADMIN — CEFAZOLIN SODIUM 2 G: 2 INJECTION, SOLUTION INTRAVENOUS at 16:41

## 2019-03-14 RX ADMIN — ACETAMINOPHEN 1000 MG: 500 TABLET, FILM COATED ORAL at 05:32

## 2019-03-14 RX ADMIN — ENOXAPARIN SODIUM 30 MG: 30 INJECTION SUBCUTANEOUS at 13:52

## 2019-03-14 RX ADMIN — OMEPRAZOLE 40 MG: 20 CAPSULE, DELAYED RELEASE ORAL at 08:58

## 2019-03-14 RX ADMIN — ACETAMINOPHEN 1000 MG: 500 TABLET, FILM COATED ORAL at 13:52

## 2019-03-14 RX ADMIN — ATORVASTATIN CALCIUM 40 MG: 40 TABLET, FILM COATED ORAL at 16:35

## 2019-03-14 RX ADMIN — OXYCODONE HYDROCHLORIDE 10 MG: 5 TABLET ORAL at 05:37

## 2019-03-14 RX ADMIN — BUPROPION HYDROCHLORIDE 150 MG: 150 TABLET, EXTENDED RELEASE ORAL at 08:58

## 2019-03-14 RX ADMIN — CEFAZOLIN SODIUM 2 G: 2 INJECTION, SOLUTION INTRAVENOUS at 23:51

## 2019-03-14 RX ADMIN — POLYETHYLENE GLYCOL 3350 17 G: 17 POWDER, FOR SOLUTION ORAL at 08:57

## 2019-03-14 RX ADMIN — AMLODIPINE BESYLATE 5 MG: 5 TABLET ORAL at 08:58

## 2019-03-14 ASSESSMENT — ACTIVITIES OF DAILY LIVING (ADL)
ADLS_ACUITY_SCORE: 22

## 2019-03-14 NOTE — PLAN OF CARE
Patient is A&Ox4.  Up with 1 and gait belt. Tolerating regular diet.  PRN Oxy x1 for L hip pain. LS  diminished. IV antibiotics.VSS on 4L NC, 94% O2.  Left hip joint aspiration today.NPO at midnight. ID following.  Will continue to monitor.

## 2019-03-14 NOTE — PROGRESS NOTES
"[facility]  Infectious Disease Progress Note          Assessment and Plan:   IMPRESSION:   1.  An 88-year-old female with 1 week of worsening low back and left hip area pain, severe, and unrelenting, MRI scan of the back is negative.  CT scan pending from the hip, in light of #2 below.  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   2.  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 #3 below.   3.  Respiratory symptoms, doubt second infection.  If infection in the lungs, likely Staphylococcus aureus also.        RECOMMENDATIONS:   1.  Continue Ancef   2.  Serial blood cultures until clear. 3/12 +. 3/13 neg so far, no long line yet  3.   hip aspirate benign , not infected looking, ? Pain early discitis, if so ABX alone, imaging wo other obvious staph complication  4 If BC clears, fever resolves, simply tx extended IV course            Interval History:   no new complaints and a bit better pain control; no cp, sob, n/v/d, or abd pain. T down, cognition nl hip aspirate cx pending but fluid not infected looking              Medications:       acetaminophen  1,000 mg Oral Q8H     amLODIPine  5 mg Oral QAM     aspirin  81 mg Oral QAM     atorvastatin  40 mg Oral Daily with supper     buPROPion  150 mg Oral QAM     calcium carbonate 600 mg-vitamin D 400 units  1 tablet Oral Daily with supper     ceFAZolin  2 g Intravenous Q8H     citalopram  40 mg Oral QAM     multivitamin w/minerals  1 tablet Oral Daily     omeprazole  40 mg Oral QAM     oxybutynin  5 mg Oral BID     polyethylene glycol  17 g Oral Daily     sennosides  2 tablet Oral BID     vitamin D3  2,000 Units Oral QAM                  Physical Exam:   Blood pressure 117/56, pulse 75, temperature 99.8  F (37.7  C), temperature source Oral, resp. rate 16, height 1.448 m (4' 9.01\"), weight 57.1 kg " (125 lb 14.1 oz), SpO2 90 %.  Wt Readings from Last 2 Encounters:   03/12/19 57.1 kg (125 lb 14.1 oz)   12/11/17 54.4 kg (120 lb)     Vital Signs with Ranges  Temp:  [98.8  F (37.1  C)-99.8  F (37.7  C)] 99.8  F (37.7  C)  Pulse:  [75-81] 75  Resp:  [16-18] 16  BP: (107-125)/(56-63) 117/56  SpO2:  [90 %-94 %] 90 %    Constitutional: Awake, alert, cooperative, no apparent distress   Lungs: Clear to auscultation bilaterally, no crackles or wheezing   Cardiovascular: Regular rate and rhythm, normal S1 and S2, and no murmur noted   Abdomen: Normal bowel sounds, soft, non-distended, non-tender   Skin: No rashes, no cyanosis, no edema pain with any movement ? Back but hip movement also an issue   Other:           Data:   All microbiology laboratory data reviewed.  Recent Labs   Lab Test 03/13/19  0919 03/11/19 2010 12/14/17  0640  11/24/16  0556   WBC 6.4 8.6  --   --  4.8   HGB 9.3* 10.1* 8.5*   < > 8.4*   HCT 28.3* 30.2*  --   --  25.3*   MCV 93 92  --   --  89    204 156   < > 114*    < > = values in this interval not displayed.     Recent Labs   Lab Test 03/13/19  0919 03/11/19 2010 12/14/17  0640   CR 0.91 0.87 0.90     Recent Labs   Lab Test 03/13/19  0919   SED 69*     Recent Labs   Lab Test 03/13/19  1530 03/13/19  0918 03/12/19  1540 03/12/19  1535 03/11/19  2200 03/11/19  2045 03/11/19 2010 06/20/11  1850 06/20/11  1120   CULT PENDING No growth after 17 hours Cultured on the 1st day of incubation:  Gram positive cocci in clusters  *  Critical Value/Significant Value, preliminary result only, called to and read back by  MING ALBRIGHT RN 1550 3.13.19 NDP   Cultured on the 2nd day of incubation:  Gram positive cocci in clusters  *  Critical Value/Significant Value, preliminary result only, called to and read back by  Chiqui Duran, RN 0037 3/14/19. MS   Cultured on the 1st day of incubation:  Staphylococcus aureus  Susceptibility testing done on previous specimen  *  Critical Value/Significant  Value, preliminary result only, called to and read back by  Sonya Waite RN SH66 @ 1318 3.12.19 JE   No growth Cultured on the 1st day of incubation:  Staphylococcus aureus  *  Critical Value/Significant Value, preliminary result only, called to and read back by  Sonya Waite RN from [facility] MS 6 on 3.12.19 at 1128 by SS.    (Note)  POSITIVE for STAPHYLOCOCCUS AUREUS and NEGATIVE for the mecA gene  (not MRSA) by [employer] multiplex nucleic acid test. The mecA gene was  not detected. Final identification and antimicrobial susceptibility  testing will be verified by standard methods.    Specimen tested with [employer] multiplex, gram-positive blood culture  nucleic acid test for the following targets: Staph aureus, Staph  epidermidis, Staph lugdunensis, other Staph species, Enterococcus  faecalis, Enterococcus faecium, Streptococcus species, S. agalactiae,  S. anginosus grp., S. pneumoniae, S. pyogenes, Listeria sp., mecA  (methicillin resistance) and Noble/B (vancomycin resistance).    Critical Value/Significant Value called to and read back by ricardo waite rn @1897 3/12/19. scg     No growth No growth after 6 days

## 2019-03-14 NOTE — PROGRESS NOTES
[facility]  Hospitalist Progress Note    Assessment & Plan   Parvin Wen is a very pleasant 88 year old female with hypertension, depression, anxiety, dyslipidemia, GERD who was admitted on 3/11/2019 with intractable low back pain and suspected septic left MONA.    Left lower back and hip pain  Bacteremia, MSSA  History of left-sided hip replacement approximately 1 year ago  Concern for left septic total hip arthroplasty, CT scan of the hip negative for any obvious complication.  Cultures from the left hip synovial fluid is negative so far, white cell count about 585 argue against any left total hip arthroplasty infection at this time.  MRI of the lumbar spine is negative for any discitis or osteomyelitis.  She does have compression fractures chronic and degenerative disc disease of all lumbar spines.  She is on IV cefazolin and 2 g every 8 hourly I will continue with that.  Given high-grade bacteremia, no obvious source of infection at this time.  I will go ahead and do the echocardiogram to rule out any endocarditis at this time.  Infectious disease and orthopedic is following.        Hypertension  Normotensive at present  -Continue PTA Norvasc 5 mg daily  -PRN IV labetalol and hydralazine for systolic blood pressure greater than 180    Dyslipidemia  -Continue aspirin 81 mg daily, statin    Depression, anxiety  -Continue PTA Wellbutrin, Celexa    GERD  -Continue PTA PPI    Chronic urinary incontinence  -Continue PTA oxybutynin    Constipation  Patient is quite constipated, has no bowel movement for a week or more.  CT scan of the abdomen shows a lot of stools as well.  She is started on MiraLAX 17 g daily and Senokot, I will increase the dose of MiraLAX to 70 g twice daily continue the Senokot and and will order an soapsuds enema today.    Orders Placed This Encounter      Regular Diet Adult    DVT prophylaxis: Started on Lovenox 30 mg daily.  Code Status: Full Code    Disposition:  Expected discharge unknown.  Possibly 2-3 days, may need the placement.      Repeat blood culture from 3/13/2019 is negative so far  Repeat blood culture today as per ID  Unlikely septic arthritis of the left hip as the white cell count is only 585, and the cultures are negative so far  Echocardiogram to rule out any endocarditis  Continue IV Ancef at this time.  Increase MiraLAX 17 g twice a day continue Senokot and give soapsuds enema today        Kingsley Armstrong MD  [phone] (7am - 6pm)  Text Page  ~~~~~~~~~~~~~~~~~~~~~~~~~~~~~~~~~~~~~~~~~~~~~~~  Interval History   Seen earlier today and patient miserable with pain.  Went for IR aspiration of hip this afternoon.    -Data reviewed today: I reviewed all new labs and imaging results over the last 24 hours.     Physical Exam   Temp: 99.8  F (37.7  C) Temp src: Oral BP: 117/56 Pulse: 75   Resp: 16 SpO2: 90 % O2 Device: Nasal cannula Oxygen Delivery: 4 LPM  Vitals:    03/11/19 1937 03/12/19 0353   Weight: 55.8 kg (123 lb) 57.1 kg (125 lb 14.1 oz)     Vital Signs with Ranges  Temp:  [98.8  F (37.1  C)-99.8  F (37.7  C)] 99.8  F (37.7  C)  Pulse:  [75-81] 75  Resp:  [16-18] 16  BP: (107-125)/(56-63) 117/56  SpO2:  [90 %-93 %] 90 %  I/O last 3 completed shifts:  In: 290 [P.O.:290]  Out: 510 [Urine:510]    Constitutional: Writhing in pain, alert  HEENT: mmm, sclerae anicteric  Respiratory: Lungs CTAB, no wheezes or crackles  Cardiovascular: RRR, no murmurs  No LE edema present  GI: soft, diffuse tender, nondistended, no organomegaly  Skin/Integument: warm, dry, no acute rashes    Medications       acetaminophen  1,000 mg Oral Q8H     amLODIPine  5 mg Oral QAM     aspirin  81 mg Oral QAM     atorvastatin  40 mg Oral Daily with supper     buPROPion  150 mg Oral QAM     calcium carbonate 600 mg-vitamin D 400 units  1 tablet Oral Daily with supper     ceFAZolin  2 g Intravenous Q8H     citalopram  40 mg Oral QAM     multivitamin w/minerals  1 tablet Oral Daily     omeprazole   40 mg Oral QAM     oxybutynin  5 mg Oral BID     polyethylene glycol  17 g Oral BID     sennosides  2 tablet Oral BID     vitamin D3  2,000 Units Oral QAM       Data   Recent Labs   Lab 03/14/19  0919 03/13/19 0919 03/11/19 2010   WBC  --  6.4 8.6   HGB  --  9.3* 10.1*   MCV  --  93 92   PLT  --  162 204   NA  --  136 135   POTASSIUM  --  3.6 3.7   CHLORIDE  --  106 104   CO2  --  23 24   BUN  --  18 18   CR 0.90 0.91 0.87   ANIONGAP  --  7 7   MISSY  --  8.0* 8.1*   GLC  --  105* 108*   ALBUMIN  --   --  3.1*   PROTTOTAL  --   --  7.3   BILITOTAL  --   --  0.5   ALKPHOS  --   --  88   ALT  --   --  24   AST  --   --  23   LIPASE  --   --  177       Imaging:  Recent Results (from the past 24 hour(s))   XR Joint Aspiration Major Left    Narrative    EXAM: Fluoroscopic guided hip aspiration   3/13/19       History:  Left MONA suspect septic MONA..     Operators:  MD Sunitha Cisse    PROCEDURE: The risks (including bleeding, infection, and allergy to  contrast and medications) and benefits of the procedure were explained  to the patient and consent was obtained.  Using sterile technique and  fluoroscopic guidance, a #20 gauge needle was placed into the left hip  joint using an anterior approach.  Proximally 0.5 cc serous fluid was  aspirated.  Estimated blood loss during the procedure was less than 5  mL. No initial complication. Fluid sent to lab for analysis.      Fluoroscopy time: .1  Images Obtained: 1  Medications used: 3mL Local Lidocaine 1%.       Impression    IMPRESSION:  Technically successful left hip aspiration.    PATRICK PERALES MD

## 2019-03-14 NOTE — PLAN OF CARE
Pt is A/Ox4, VSS on 4L O2 NC, L hip/Lower back pain managed with Oxycodone.  NPO @ MN d/t possible surgical intervention this am.  VICTORIAO, BS active, +flatus. Up Aof1 w/GB. D/C pending.

## 2019-03-14 NOTE — PROGRESS NOTES
Orthopedic Surgery  [facility] Lauren Wen  3/14/2019  Admit Date:  3/11/2019  Back and left hip pain    Patient just received enema and sitting on commode at my time of visit  Pain controlled at this time but she states that it comes and goes and can be very painful  Denies any CP or SOB  No events over night    Aspiration completed yesterday afternoon    Alert and orient to person, place, and time.  Vital Sign Ranges  Temperature Temp  Av.2  F (37.3  C)  Min: 98.8  F (37.1  C)  Max: 99.8  F (37.7  C)   Blood pressure Systolic (24hrs), Av , Min:107 , Max:125        Diastolic (24hrs), Av, Min:56, Max:63      Pulse Pulse  Av  Min: 75  Max: 81   Respirations Resp  Av.4  Min: 16  Max: 18   Pulse oximetry SpO2  Av.3 %  Min: 90 %  Max: 93 %       Prior incision of posterior lateral scar on the left without erythema or drianage, well healed  Minimal erythema of the surrounding skin.   Bilateral calves are soft, non-tender.  Bilateral lower extremity is NVI.  Sensation intact bilateral lower extremities  5/5 motor with resisted dorsi and plantar flexion bilaterally  +Dp pulse    Aspirate of the left hip negative gram stain, low cell count (583 WBC), crystals negative, culture negative (pending)   Was on abx prior to aspiration but very low WBC count likely not a septic MONA    Labs:  Recent Labs   Lab Test 19  0919 19  0918   POTASSIUM 3.6 3.7 3.8     Recent Labs   Lab Test 19  0919 19  0640   HGB 9.3* 10.1* 8.5*     Recent Labs   Lab Test 16  1722 11  1150   INR 0.96 0.93     Recent Labs   Lab Test 19  0919 19  0640    204 156       Plan:   Continue Lovenox for DVT prophylaxis.     Mobilize with PT/OT    WBAT with walker.     Continue current pain regiment.   Ortho will continue to follow     Rima Daly PA-C

## 2019-03-14 NOTE — PLAN OF CARE
Pt is A&Ox4.  Up with assit of 1 and gait belt. VSS on 4L of oxygen via nc, satting 92%-95%. Encouraged use of IS and deep breathing. LS diminished, coarse at bases. Tolerating regular diet. Per speech no straw with fluids intake. C/o of back pain, managed with oxycodone PO. Pt will continue on IV  antibiotics. Pt is incontinent at times. Up to BSC to urinate. Pt is constipated, enema is given around 2:00 pm, still no BM. ID/Ortho is following. Echo done today, see results. Will continue to monitor.

## 2019-03-15 ENCOUNTER — APPOINTMENT (OUTPATIENT)
Dept: GENERAL RADIOLOGY | Facility: CLINIC | Age: 84
DRG: 560 | End: 2019-03-15
Attending: INTERNAL MEDICINE
Payer: MEDICARE

## 2019-03-15 LAB
BACTERIA SPEC CULT: ABNORMAL
BACTERIA SPEC CULT: ABNORMAL
CREAT SERPL-MCNC: 0.79 MG/DL (ref 0.52–1.04)
GFR SERPL CREATININE-BSD FRML MDRD: 66 ML/MIN/{1.73_M2}
Lab: ABNORMAL
PLATELET # BLD AUTO: 261 10E9/L (ref 150–450)
SPECIMEN SOURCE: ABNORMAL

## 2019-03-15 PROCEDURE — 36415 COLL VENOUS BLD VENIPUNCTURE: CPT | Performed by: INTERNAL MEDICINE

## 2019-03-15 PROCEDURE — 25000132 ZZH RX MED GY IP 250 OP 250 PS 637: Mod: GY | Performed by: INTERNAL MEDICINE

## 2019-03-15 PROCEDURE — A9270 NON-COVERED ITEM OR SERVICE: HCPCS | Mod: GY | Performed by: INTERNAL MEDICINE

## 2019-03-15 PROCEDURE — 82565 ASSAY OF CREATININE: CPT | Performed by: INTERNAL MEDICINE

## 2019-03-15 PROCEDURE — 99232 SBSQ HOSP IP/OBS MODERATE 35: CPT | Performed by: INTERNAL MEDICINE

## 2019-03-15 PROCEDURE — 25000128 H RX IP 250 OP 636: Performed by: INTERNAL MEDICINE

## 2019-03-15 PROCEDURE — 12000000 ZZH R&B MED SURG/OB

## 2019-03-15 PROCEDURE — 85049 AUTOMATED PLATELET COUNT: CPT | Performed by: INTERNAL MEDICINE

## 2019-03-15 PROCEDURE — 87040 BLOOD CULTURE FOR BACTERIA: CPT | Performed by: INTERNAL MEDICINE

## 2019-03-15 PROCEDURE — 71045 X-RAY EXAM CHEST 1 VIEW: CPT

## 2019-03-15 RX ORDER — BISACODYL 10 MG
10 SUPPOSITORY, RECTAL RECTAL DAILY PRN
Status: DISCONTINUED | OUTPATIENT
Start: 2019-03-15 | End: 2019-03-18 | Stop reason: HOSPADM

## 2019-03-15 RX ORDER — CEFAZOLIN SODIUM 1 G/3ML
2 INJECTION, POWDER, FOR SOLUTION INTRAMUSCULAR; INTRAVENOUS EVERY 8 HOURS
Qty: 1 EACH | Refills: 1 | Status: SHIPPED | OUTPATIENT
Start: 2019-03-15 | End: 2019-04-09

## 2019-03-15 RX ADMIN — ACETAMINOPHEN 1000 MG: 500 TABLET, FILM COATED ORAL at 14:27

## 2019-03-15 RX ADMIN — BUPROPION HYDROCHLORIDE 150 MG: 150 TABLET, EXTENDED RELEASE ORAL at 08:25

## 2019-03-15 RX ADMIN — OMEPRAZOLE 40 MG: 20 CAPSULE, DELAYED RELEASE ORAL at 08:26

## 2019-03-15 RX ADMIN — POLYETHYLENE GLYCOL 3350 17 G: 17 POWDER, FOR SOLUTION ORAL at 08:32

## 2019-03-15 RX ADMIN — OXYBUTYNIN CHLORIDE 5 MG: 5 TABLET ORAL at 08:26

## 2019-03-15 RX ADMIN — ACETAMINOPHEN 1000 MG: 500 TABLET, FILM COATED ORAL at 05:12

## 2019-03-15 RX ADMIN — ACETAMINOPHEN 1000 MG: 500 TABLET, FILM COATED ORAL at 20:41

## 2019-03-15 RX ADMIN — ATORVASTATIN CALCIUM 40 MG: 40 TABLET, FILM COATED ORAL at 16:49

## 2019-03-15 RX ADMIN — ASPIRIN 81 MG: 81 TABLET, COATED ORAL at 08:25

## 2019-03-15 RX ADMIN — CEFAZOLIN SODIUM 2 G: 2 INJECTION, SOLUTION INTRAVENOUS at 23:57

## 2019-03-15 RX ADMIN — AMLODIPINE BESYLATE 5 MG: 5 TABLET ORAL at 08:26

## 2019-03-15 RX ADMIN — SENNOSIDES 2 TABLET: 8.6 TABLET, FILM COATED ORAL at 20:41

## 2019-03-15 RX ADMIN — OXYCODONE HYDROCHLORIDE 10 MG: 5 TABLET ORAL at 08:26

## 2019-03-15 RX ADMIN — OXYBUTYNIN CHLORIDE 5 MG: 5 TABLET ORAL at 20:41

## 2019-03-15 RX ADMIN — ENOXAPARIN SODIUM 30 MG: 30 INJECTION SUBCUTANEOUS at 14:27

## 2019-03-15 RX ADMIN — OXYCODONE HYDROCHLORIDE 10 MG: 5 TABLET ORAL at 20:41

## 2019-03-15 RX ADMIN — MULTIPLE VITAMINS W/ MINERALS TAB 1 TABLET: TAB at 08:25

## 2019-03-15 RX ADMIN — SENNOSIDES 2 TABLET: 8.6 TABLET, FILM COATED ORAL at 08:25

## 2019-03-15 RX ADMIN — OXYCODONE HYDROCHLORIDE 10 MG: 5 TABLET ORAL at 16:20

## 2019-03-15 RX ADMIN — Medication 1 TABLET: at 17:11

## 2019-03-15 RX ADMIN — OXYCODONE HYDROCHLORIDE 10 MG: 5 TABLET ORAL at 03:25

## 2019-03-15 RX ADMIN — CEFAZOLIN SODIUM 2 G: 2 INJECTION, SOLUTION INTRAVENOUS at 16:49

## 2019-03-15 RX ADMIN — CITALOPRAM HYDROBROMIDE 40 MG: 40 TABLET ORAL at 08:25

## 2019-03-15 RX ADMIN — VITAMIN D, TAB 1000IU (100/BT) 2000 UNITS: 25 TAB at 08:26

## 2019-03-15 RX ADMIN — OXYCODONE HYDROCHLORIDE 10 MG: 5 TABLET ORAL at 11:58

## 2019-03-15 RX ADMIN — CEFAZOLIN SODIUM 2 G: 2 INJECTION, SOLUTION INTRAVENOUS at 08:43

## 2019-03-15 ASSESSMENT — ACTIVITIES OF DAILY LIVING (ADL)
ADLS_ACUITY_SCORE: 22

## 2019-03-15 NOTE — PLAN OF CARE
Pt A&Ox4. Max Temp 99.5. Other VSS on 4L. She continues to c/o left lower back pain; PRN Oxycodone and scheduled Tylenol helpful. She is up with assist of 1. Discharge pending

## 2019-03-15 NOTE — PROGRESS NOTES
D: [facility] following for discharge planning. Possible discharge this weekend to TCU.   I: Pt has been accepted at [facility]. [facility] spoke with Lisa. They can not accept pt this weekend due to staffing levels. If still hospitalized on Monday, they will have a bed for pt then.   P: [facility] following.     ANIL Mathis, SW  [id number]

## 2019-03-15 NOTE — PLAN OF CARE
Pt alert and oriented. Up with 1/walker and belt,able to walk short distance in the he. Continues to have spasm like left/lower back pain. Rates pain 5/10,increases to 7or 8 with activity. Requesting oxycodone q3h along with scheduled tylenol. Attempted to wean O2 but at 3l was only 90%. Denies sob,LSC. Using IS. Poor appetite,supplements ordered.

## 2019-03-15 NOTE — PROGRESS NOTES
[facility]  Infectious Disease Progress Note          Assessment and Plan:   IMPRESSION:   1.  An 88-year-old female with 1 week of worsening low back and left hip area pain, severe, and unrelenting, MRI scan of the back is negative.  CT scan pending from the hip, in light of #2 below.  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   2.  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 #3 below.   3.  Respiratory symptoms, doubt second infection.  If infection in the lungs, likely Staphylococcus aureus also.        RECOMMENDATIONS:   1.  Continue Ancef   2.  Serial blood cultures until clear. 3/12 +. 3/13 still neg so far, no long line yet but if still neg Ok 3/16  3.   hip aspirate benign , not infected looking, cx neg? Pain early discitis, if so ABX alone, imaging wo other obvious staph complication  4 Assuming BC clears, fever stays resolved, simply tx extended IV course and watch pain  5 call if issues this WE ,OK PICC 3/16 if BC still neg, orders in for ancef , continue to work on pain and follow over time            Interval History:   no new complaints and a bit better pain control; no cp, sob, n/v/d, or abd pain. T down, cognition nl hip aspirate cx pending but fluid not infected looking              Medications:       acetaminophen  1,000 mg Oral Q8H     amLODIPine  5 mg Oral QAM     aspirin  81 mg Oral QAM     atorvastatin  40 mg Oral Daily with supper     buPROPion  150 mg Oral QAM     calcium carbonate 600 mg-vitamin D 400 units  1 tablet Oral Daily with supper     ceFAZolin  2 g Intravenous Q8H     citalopram  40 mg Oral QAM     enoxaparin  30 mg Subcutaneous Q24H     multivitamin w/minerals  1 tablet Oral Daily     omeprazole  40 mg Oral QAM     oxybutynin  5 mg Oral BID     polyethylene glycol  17 g Oral BID     sennosides  2  "tablet Oral BID     vitamin D3  2,000 Units Oral QAM                  Physical Exam:   Blood pressure 134/69, pulse 68, temperature 98.9  F (37.2  C), temperature source Oral, resp. rate 16, height 1.448 m (4' 9.01\"), weight 57.1 kg (125 lb 14.1 oz), SpO2 95 %.  Wt Readings from Last 2 Encounters:   03/12/19 57.1 kg (125 lb 14.1 oz)   12/11/17 54.4 kg (120 lb)     Vital Signs with Ranges  Temp:  [98.3  F (36.8  C)-99.5  F (37.5  C)] 98.9  F (37.2  C)  Pulse:  [68-87] 68  Resp:  [16-20] 16  BP: (100-134)/(54-69) 134/69  SpO2:  [90 %-95 %] 95 %    Constitutional: Awake, alert, cooperative, no apparent distress   Lungs: Clear to auscultation bilaterally, no crackles or wheezing   Cardiovascular: Regular rate and rhythm, normal S1 and S2, and no murmur noted   Abdomen: Normal bowel sounds, soft, non-distended, non-tender   Skin: No rashes, no cyanosis, no edema pain with any movement ? Back but hip movement also an issue   Other:           Data:   All microbiology laboratory data reviewed.  Recent Labs   Lab Test 03/15/19  1000 03/13/19  0919 03/11/19 2010 12/14/17  0640  11/24/16  0556   WBC  --  6.4 8.6  --   --  4.8   HGB  --  9.3* 10.1* 8.5*   < > 8.4*   HCT  --  28.3* 30.2*  --   --  25.3*   MCV  --  93 92  --   --  89    162 204 156   < > 114*    < > = values in this interval not displayed.     Recent Labs   Lab Test 03/14/19  0919 03/13/19  0919 03/11/19 2010   CR 0.90 0.91 0.87     Recent Labs   Lab Test 03/13/19  0919   SED 69*     Recent Labs   Lab Test 03/14/19  0918 03/13/19  1530 03/13/19  0918 03/12/19  1540 03/12/19  1535 03/11/19  2200 03/11/19  2045 03/11/19 2010 06/20/11  1850   CULT No growth after 17 hours Culture negative monitoring continues No growth after 2 days Cultured on the 1st day of incubation:  Staphylococcus aureus  Susceptibility testing done on previous specimen  *  Critical Value/Significant Value, preliminary result only, called to and read back by  MING ALBRIGHT, RN 1550 " 3.13.19 NDP   Cultured on the 2nd day of incubation:  Gram positive cocci in clusters  *  Critical Value/Significant Value, preliminary result only, called to and read back by  Chiqui Duran, RN 0037 3/14/19. MS    Susceptibility testing done on previous specimen Cultured on the 1st day of incubation:  Staphylococcus aureus  Susceptibility testing done on previous specimen  *  Critical Value/Significant Value, preliminary result only, called to and read back by  Sonya Waite RN SH66 @ 1318 3.12.19 JE   No growth Cultured on the 1st day of incubation:  Staphylococcus aureus  *  Critical Value/Significant Value, preliminary result only, called to and read back by  Sonya Waite RN from [facility] MS 6 on 3.12.19 at 1128 by SS.    (Note)  POSITIVE for STAPHYLOCOCCUS AUREUS and NEGATIVE for the mecA gene  (not MRSA) by [employer] multiplex nucleic acid test. The mecA gene was  not detected. Final identification and antimicrobial susceptibility  testing will be verified by standard methods.    Specimen tested with [employer] multiplex, gram-positive blood culture  nucleic acid test for the following targets: Staph aureus, Staph  epidermidis, Staph lugdunensis, other Staph species, Enterococcus  faecalis, Enterococcus faecium, Streptococcus species, S. agalactiae,  S. anginosus grp., S. pneumoniae, S. pyogenes, Listeria sp., mecA  (methicillin resistance) and Noble/B (vancomycin resistance).    Critical Value/Significant Value called to and read back by ricardo waite rn @1716 3/12/19. scg     No growth

## 2019-03-15 NOTE — PROGRESS NOTES
[facility]    Medicine Progress Note - Hospitalist Service        Date of Admission:  3/11/2019  7:30 PM    Assessment & Plan:   Parvin Wen is a very pleasant 88 year old female with hypertension, depression, anxiety, dyslipidemia, GERD who was admitted on 3/11/2019 with intractable low back pain and suspected septic left MONA.    MSSA bacteremia    Left lower back and hip pain  History of left-sided hip replacement approximately 1 year ago  -Patient presented with fever up to 102.8 and worsening left lower back and hip pain  -Initial concern for left septic total hip arthroplasty, CT scan of the hip negative for any obvious complication.  -Status post arthrocentesis of the left hip; cultures from the left hip synovial fluid is negative so far  -MRI of the lumbar spine is negative for any discitis or osteomyelitis.  -Bacteremic with staph aureus; source of bacteremia unclear ?  Early discitis  -Infectious disease following  -Continue IV cefazolin and 2 g every 8 hourly  -Transthoracic echo without obvious vegetation  -Repeat culture from 3/13- so far, if continues to stay negative okay for PICC line on 3/16     Hypoxia:   -At admission there was concerns about healthcare associated pneumonia, she reportedly had mild nonproductive cough, apparently her O2 sats dropped after she received narcotics  -Chest x-ray from 3/11 revealed increased patchy opacity within the right lung which could represent aspiration/ infection or atelectasis  -Medically does not appear like pneumonia  -Continues to require 2-3 L oxygen, no pre-existing lung disease, obviously hypoxia could be related to ongoing narcotic use and atelectasis as patient not very mobile because of the pain  -Recheck chest x-ray today    Hypertension  -Continue PTA Norvasc 5 mg daily  -PRN IV labetalol and hydralazine for systolic blood pressure greater than 180     Dyslipidemia  -Continue aspirin 81 mg daily, statin     Depression,  "anxiety  -Continue PTA Wellbutrin, Celexa     GERD  -Continue PTA PPI     Chronic urinary incontinence  -Continue PTA oxybutynin     Constipation  -Bowel regimen.      Diet: Regular Diet Adult  Room Service     DVT Prophylaxis: Pneumatic Compression Devices   Wilson Catheter: not present  Code Status: Full Code     Disposition Plan    Expected discharge: 2-3 days, likely to TCU.  Entered: Danny Russell MD 03/15/2019, 12:28 PM        The patient's care was discussed with the Bedside Nurse and Patient.    Danny Russell MD  Hospitalist Service  [facility]    ______________________________________________________________________    Interval History   Continues to complain of ongoing back pain.  Afebrile.  Continues to require oxygen around 3 L.  No nausea.  Denies dyspnea.  No chest pain.    Data reviewed today: I reviewed all medications, new labs and imaging results over the last 24 hours. I personally reviewed no images or EKG's today.    Physical Exam   Vital signs:  Temp: 98.9  F (37.2  C) Temp src: Oral BP: 134/69 Pulse: 68   Resp: 20 SpO2: 90 % O2 Device: Nasal cannula Oxygen Delivery: 3 LPM Height: 144.8 cm (4' 9.01\") Weight: 57.1 kg (125 lb 14.1 oz)  Estimated body mass index is 27.23 kg/m  as calculated from the following:    Height as of this encounter: 1.448 m (4' 9.01\").    Weight as of this encounter: 57.1 kg (125 lb 14.1 oz).      Wt Readings from Last 2 Encounters:   03/12/19 57.1 kg (125 lb 14.1 oz)   12/11/17 54.4 kg (120 lb)       Gen: AAOX3, NAD  HEENT: Supple neck, moist oral mucosa, no pallor  Resp: Few crackles bilaterally, diminished at the bases, normal effort of breathing  CVS: RRR, no murmur  Abd/GI: Soft, non-tender. BS- normoactive.    Skin: Warm, dry no rashes  MSK: Tender lower back  Neuro- CN- intact. No focal deficits. Spontaneously moving all 4 extremities      Data   Recent Labs   Lab 03/15/19  1000 03/14/19  0919 03/13/19  0919 03/11/19 2010   WBC  --   --  " 6.4 8.6   HGB  --   --  9.3* 10.1*   MCV  --   --  93 92     --  162 204   NA  --   --  136 135   POTASSIUM  --   --  3.6 3.7   CHLORIDE  --   --  106 104   CO2  --   --  23 24   BUN  --   --  18 18   CR 0.79 0.90 0.91 0.87   ANIONGAP  --   --  7 7   MISSY  --   --  8.0* 8.1*   GLC  --   --  105* 108*   ALBUMIN  --   --   --  3.1*   PROTTOTAL  --   --   --  7.3   BILITOTAL  --   --   --  0.5   ALKPHOS  --   --   --  88   ALT  --   --   --  24   AST  --   --   --  23   LIPASE  --   --   --  177       No results found for this or any previous visit (from the past 24 hour(s)).  Medications       acetaminophen  1,000 mg Oral Q8H     amLODIPine  5 mg Oral QAM     aspirin  81 mg Oral QAM     atorvastatin  40 mg Oral Daily with supper     buPROPion  150 mg Oral QAM     calcium carbonate 600 mg-vitamin D 400 units  1 tablet Oral Daily with supper     ceFAZolin  2 g Intravenous Q8H     citalopram  40 mg Oral QAM     enoxaparin  30 mg Subcutaneous Q24H     multivitamin w/minerals  1 tablet Oral Daily     omeprazole  40 mg Oral QAM     oxybutynin  5 mg Oral BID     polyethylene glycol  17 g Oral BID     sennosides  2 tablet Oral BID     vitamin D3  2,000 Units Oral QAM

## 2019-03-16 LAB
BACTERIA SPEC CULT: ABNORMAL
BACTERIA SPEC CULT: ABNORMAL
CREAT SERPL-MCNC: 0.84 MG/DL (ref 0.52–1.04)
ERYTHROCYTE [DISTWIDTH] IN BLOOD BY AUTOMATED COUNT: 13.9 % (ref 10–15)
GFR SERPL CREATININE-BSD FRML MDRD: 62 ML/MIN/{1.73_M2}
HCT VFR BLD AUTO: 29.5 % (ref 35–47)
HGB BLD-MCNC: 9.3 G/DL (ref 11.7–15.7)
Lab: ABNORMAL
MCH RBC QN AUTO: 29.9 PG (ref 26.5–33)
MCHC RBC AUTO-ENTMCNC: 31.5 G/DL (ref 31.5–36.5)
MCV RBC AUTO: 95 FL (ref 78–100)
PLATELET # BLD AUTO: 288 10E9/L (ref 150–450)
RBC # BLD AUTO: 3.11 10E12/L (ref 3.8–5.2)
SPECIMEN SOURCE: ABNORMAL
WBC # BLD AUTO: 5.9 10E9/L (ref 4–11)

## 2019-03-16 PROCEDURE — 25000132 ZZH RX MED GY IP 250 OP 250 PS 637: Mod: GY | Performed by: INTERNAL MEDICINE

## 2019-03-16 PROCEDURE — A9270 NON-COVERED ITEM OR SERVICE: HCPCS | Mod: GY | Performed by: INTERNAL MEDICINE

## 2019-03-16 PROCEDURE — 85027 COMPLETE CBC AUTOMATED: CPT | Performed by: INTERNAL MEDICINE

## 2019-03-16 PROCEDURE — 25000128 H RX IP 250 OP 636: Performed by: INTERNAL MEDICINE

## 2019-03-16 PROCEDURE — 36415 COLL VENOUS BLD VENIPUNCTURE: CPT | Performed by: INTERNAL MEDICINE

## 2019-03-16 PROCEDURE — 82565 ASSAY OF CREATININE: CPT | Performed by: INTERNAL MEDICINE

## 2019-03-16 PROCEDURE — 99232 SBSQ HOSP IP/OBS MODERATE 35: CPT | Performed by: INTERNAL MEDICINE

## 2019-03-16 PROCEDURE — 12000000 ZZH R&B MED SURG/OB

## 2019-03-16 RX ADMIN — ENOXAPARIN SODIUM 30 MG: 30 INJECTION SUBCUTANEOUS at 12:50

## 2019-03-16 RX ADMIN — Medication 10 MG: at 10:25

## 2019-03-16 RX ADMIN — OXYCODONE HYDROCHLORIDE 10 MG: 5 TABLET ORAL at 00:02

## 2019-03-16 RX ADMIN — ATORVASTATIN CALCIUM 40 MG: 40 TABLET, FILM COATED ORAL at 20:39

## 2019-03-16 RX ADMIN — Medication 1 TABLET: at 20:40

## 2019-03-16 RX ADMIN — CEFAZOLIN SODIUM 2 G: 2 INJECTION, SOLUTION INTRAVENOUS at 08:41

## 2019-03-16 RX ADMIN — VITAMIN D, TAB 1000IU (100/BT) 2000 UNITS: 25 TAB at 08:32

## 2019-03-16 RX ADMIN — ASPIRIN 81 MG: 81 TABLET, COATED ORAL at 08:32

## 2019-03-16 RX ADMIN — OMEPRAZOLE 40 MG: 20 CAPSULE, DELAYED RELEASE ORAL at 08:32

## 2019-03-16 RX ADMIN — OXYBUTYNIN CHLORIDE 5 MG: 5 TABLET ORAL at 08:31

## 2019-03-16 RX ADMIN — CITALOPRAM HYDROBROMIDE 40 MG: 40 TABLET ORAL at 08:31

## 2019-03-16 RX ADMIN — POLYETHYLENE GLYCOL 3350 17 G: 17 POWDER, FOR SOLUTION ORAL at 08:32

## 2019-03-16 RX ADMIN — OXYCODONE HYDROCHLORIDE 10 MG: 5 TABLET ORAL at 12:49

## 2019-03-16 RX ADMIN — OXYCODONE HYDROCHLORIDE 10 MG: 5 TABLET ORAL at 17:10

## 2019-03-16 RX ADMIN — OXYCODONE HYDROCHLORIDE 10 MG: 5 TABLET ORAL at 08:31

## 2019-03-16 RX ADMIN — OXYBUTYNIN CHLORIDE 5 MG: 5 TABLET ORAL at 20:40

## 2019-03-16 RX ADMIN — MULTIPLE VITAMINS W/ MINERALS TAB 1 TABLET: TAB at 08:32

## 2019-03-16 RX ADMIN — CEFAZOLIN SODIUM 2 G: 2 INJECTION, SOLUTION INTRAVENOUS at 16:57

## 2019-03-16 RX ADMIN — AMLODIPINE BESYLATE 5 MG: 5 TABLET ORAL at 08:32

## 2019-03-16 RX ADMIN — BUPROPION HYDROCHLORIDE 150 MG: 150 TABLET, EXTENDED RELEASE ORAL at 08:32

## 2019-03-16 RX ADMIN — ACETAMINOPHEN 1000 MG: 500 TABLET, FILM COATED ORAL at 20:40

## 2019-03-16 RX ADMIN — ACETAMINOPHEN 1000 MG: 500 TABLET, FILM COATED ORAL at 04:52

## 2019-03-16 RX ADMIN — OXYCODONE HYDROCHLORIDE 10 MG: 5 TABLET ORAL at 04:52

## 2019-03-16 RX ADMIN — SENNOSIDES 2 TABLET: 8.6 TABLET, FILM COATED ORAL at 08:32

## 2019-03-16 RX ADMIN — ACETAMINOPHEN 1000 MG: 500 TABLET, FILM COATED ORAL at 12:49

## 2019-03-16 ASSESSMENT — ACTIVITIES OF DAILY LIVING (ADL)
ADLS_ACUITY_SCORE: 22
ADLS_ACUITY_SCORE: 20
ADLS_ACUITY_SCORE: 22

## 2019-03-16 NOTE — PROGRESS NOTES
Orthopedic Surgery  3/16/2019    Chart reviewed. Noted awaiting placement issues likely not until Monday. ID note appreciated with treatment of early discitis. Will sign off. Please reconsult Ortho prn.    Tori Ashton PA-C  [phone]

## 2019-03-16 NOTE — PROGRESS NOTES
[facility]  Hospitalist Progress Note  Luis Enrique Elizabeth MD    Assessment & Plan   Parvin Wen is a very pleasant 88 year old female with PMHx notable for hypertension, depression, anxiety, dyslipidemia, and GERD who was admitted on 3/11/2019 with intractable low back pain and suspected septic left MONA.     MSSA bacteremia    Left lower back and hip pain  History of left-sided hip replacement approximately 1 year ago  -Patient presented with fever up to 102.8 and worsening left lower back and hip pain  -Initial concern for left septic total hip arthroplasty.  -CT scan of the hip negative for any obvious complication.  -Status post arthrocentesis of the left hip 03/13; cultures from the left hip synovial fluid is negative so far  -Bacteremic with methicillin sensitive staph aureus; source of bacteremia unclear. Question early discitis.  -MRI of the lumbar spine negative for any discitis or osteomyelitis.  -Transthoracic echo 03/14 without obvious vegetation  -Started on IV cefazolin and 2 g every 8 hourly  -Repeat culture from 3/13 forward negative so far  -PICC line ordered for 03/17  -Infectious disease following. Plan for IV Ancef for 25 more days.     Hypoxia:   -At admission there was concerns about healthcare associated pneumonia, she reportedly had mild nonproductive cough, apparently her O2 sats dropped after she received narcotics  -Chest x-ray from 3/11 revealed increased patchy opacity within the right lung which could represent aspiration/ infection or atelectasis  -Patient was started on IV Zosyn and zithromax upon admission  -Medically did not appear like pneumonia, with normal white cell of 8.6 and low procalcitonin of 0.06.  -Continues to require 3-4 L oxygen, no pre-existing lung disease;  hypoxia could be related to ongoing narcotic use and atelectasis as patient not very mobile because of the pain  -Recheck chest x-ray 03/15 showed clear lungs  -Echo 03/14 showed LVEF of  65-70% and mildly dilated ascending aorta  -Currently only on IV Ancef as above for MSSA bacteremia  -Wean O2 as able    Physical deconditioning:   -PT     Hypertension  BP controlled.  -Continue PTA Norvasc 5 mg daily  -PRN IV labetalol and hydralazine for systolic blood pressure greater than 180     Dyslipidemia  -Continue PTA atorvastatin 40 mg po daily     Depression, anxiety  -Continue PTA Wellbutrin, Celexa     GERD  -Continue PTA omperazole     Chronic urinary incontinence  -Continue PTA oxybutynin     Constipation  -Bowel regimen.    Chronic anemia.  Stable around baseline of 9-10.    Recent Labs   Lab 03/16/19  0950 03/13/19  0919 03/11/19 2010   HGB 9.3* 9.3* 10.1*         Orders Placed This Encounter      Regular Diet Adult         DVT Prophylaxis: Lovenox     Code Status: Full Code    Disposition: Expected discharge: in 1-2 days, likely to TCU, pending respiratory status.    D/W RN.  D/W grand daughter.    Interval History   Patient is doing well. Pain is controlled. She reports no cp, sob, cough, or fever. She still requires 3-4 L/min O2.    Data reviewed today: I reviewed all new labs and imaging over the last 24 hours. I personally reviewed no images or EKG's today.    Physical Exam   Temp: 98.1  F (36.7  C) Temp src: Oral BP: 132/66 Pulse: 70   Resp: 16 SpO2: 97 % O2 Device: Nasal cannula Oxygen Delivery: 4 LPM  Vitals:    03/11/19 1937 03/12/19 0353   Weight: 55.8 kg (123 lb) 57.1 kg (125 lb 14.1 oz)     Vital Signs with Ranges  Temp:  [98.1  F (36.7  C)-98.6  F (37  C)] 98.1  F (36.7  C)  Pulse:  [65-80] 70  Resp:  [16-20] 16  BP: (120-132)/(55-66) 132/66  SpO2:  [90 %-97 %] 97 %  I/O's Last 24 hours  I/O last 3 completed shifts:  In: 100 [I.V.:100]  Out: 500 [Urine:500]    Constitutional: Comfortable, no acute distress  HEENT: + conjunctival pallor.  Neurologic: Awake, alert, fully oriented  Neck: Supple, no elevated JVP  Respiratory: Clear to auscultation B/L  Cardiovascular: Normal S1 and S2,  regular rhythm and rate  GI: Abdomen soft, non tender, non distended  Extremities: No calf tenderness, no edema  Skin/integument: No acute rash, no cyanosis    Medications   All medications were reviewed.      acetaminophen  1,000 mg Oral Q8H     amLODIPine  5 mg Oral QAM     aspirin  81 mg Oral QAM     atorvastatin  40 mg Oral Daily with supper     buPROPion  150 mg Oral QAM     calcium carbonate 600 mg-vitamin D 400 units  1 tablet Oral Daily with supper     ceFAZolin  2 g Intravenous Q8H     citalopram  40 mg Oral QAM     enoxaparin  30 mg Subcutaneous Q24H     multivitamin w/minerals  1 tablet Oral Daily     omeprazole  40 mg Oral QAM     oxybutynin  5 mg Oral BID     polyethylene glycol  17 g Oral BID     sennosides  2 tablet Oral BID     vitamin D3  2,000 Units Oral QAM        Data   Recent Labs   Lab 03/16/19  0950 03/15/19  1000 03/14/19  0919 03/13/19  0919 03/11/19 2010   WBC 5.9  --   --  6.4 8.6   HGB 9.3*  --   --  9.3* 10.1*   MCV 95  --   --  93 92    261  --  162 204   NA  --   --   --  136 135   POTASSIUM  --   --   --  3.6 3.7   CHLORIDE  --   --   --  106 104   CO2  --   --   --  23 24   BUN  --   --   --  18 18   CR 0.84 0.79 0.90 0.91 0.87   ANIONGAP  --   --   --  7 7   MISSY  --   --   --  8.0* 8.1*   GLC  --   --   --  105* 108*   ALBUMIN  --   --   --   --  3.1*   PROTTOTAL  --   --   --   --  7.3   BILITOTAL  --   --   --   --  0.5   ALKPHOS  --   --   --   --  88   ALT  --   --   --   --  24   AST  --   --   --   --  23   LIPASE  --   --   --   --  177       No results found for this or any previous visit (from the past 24 hour(s)).

## 2019-03-16 NOTE — PLAN OF CARE
Pt alert and oriented. Up with 1/walker and belt. O2 on at 4l,attempting to wean,was 86% on room air. Rates pain at 5,increases to 7-8 with activity-has spasm like pain left lower back. Poor appetite,had emesis x1. Had good results from dulcolox supp. PICC will be place in am,pt aware.

## 2019-03-16 NOTE — PLAN OF CARE
Alert and oriented but forgetful at times. Up with 1, walker and gait belt to commode. Oxycodone for back pain. Constipated, senna given. Lung sounds clear. VSS on 4-5 Liters NC. IS and flutter valve encouraged.  Plan for PICC line today pending blood culture results.

## 2019-03-17 ENCOUNTER — APPOINTMENT (OUTPATIENT)
Dept: PHYSICAL THERAPY | Facility: CLINIC | Age: 84
DRG: 560 | End: 2019-03-17
Attending: INTERNAL MEDICINE
Payer: MEDICARE

## 2019-03-17 LAB
CREAT SERPL-MCNC: 0.75 MG/DL (ref 0.52–1.04)
GFR SERPL CREATININE-BSD FRML MDRD: 71 ML/MIN/{1.73_M2}

## 2019-03-17 PROCEDURE — 97161 PT EVAL LOW COMPLEX 20 MIN: CPT | Mod: GP

## 2019-03-17 PROCEDURE — A9270 NON-COVERED ITEM OR SERVICE: HCPCS | Mod: GY | Performed by: INTERNAL MEDICINE

## 2019-03-17 PROCEDURE — 25000132 ZZH RX MED GY IP 250 OP 250 PS 637: Mod: GY | Performed by: INTERNAL MEDICINE

## 2019-03-17 PROCEDURE — 97116 GAIT TRAINING THERAPY: CPT | Mod: GP

## 2019-03-17 PROCEDURE — 97530 THERAPEUTIC ACTIVITIES: CPT | Mod: GP

## 2019-03-17 PROCEDURE — 25000128 H RX IP 250 OP 636: Performed by: INTERNAL MEDICINE

## 2019-03-17 PROCEDURE — 99232 SBSQ HOSP IP/OBS MODERATE 35: CPT | Performed by: INTERNAL MEDICINE

## 2019-03-17 PROCEDURE — 36415 COLL VENOUS BLD VENIPUNCTURE: CPT | Performed by: INTERNAL MEDICINE

## 2019-03-17 PROCEDURE — 27210218 ZZH KIT SHRLOCK 4FR SNGL LUM PICC

## 2019-03-17 PROCEDURE — 12000000 ZZH R&B MED SURG/OB

## 2019-03-17 PROCEDURE — 82565 ASSAY OF CREATININE: CPT | Performed by: INTERNAL MEDICINE

## 2019-03-17 PROCEDURE — 36569 INSJ PICC 5 YR+ W/O IMAGING: CPT

## 2019-03-17 RX ORDER — BUPROPION HYDROCHLORIDE 150 MG/1
150 TABLET, EXTENDED RELEASE ORAL 2 TIMES DAILY
Status: DISCONTINUED | OUTPATIENT
Start: 2019-03-17 | End: 2019-03-18 | Stop reason: HOSPADM

## 2019-03-17 RX ADMIN — ATORVASTATIN CALCIUM 40 MG: 40 TABLET, FILM COATED ORAL at 17:57

## 2019-03-17 RX ADMIN — ASPIRIN 81 MG: 81 TABLET, COATED ORAL at 10:46

## 2019-03-17 RX ADMIN — OXYBUTYNIN CHLORIDE 5 MG: 5 TABLET ORAL at 10:45

## 2019-03-17 RX ADMIN — CEFAZOLIN SODIUM 2 G: 2 INJECTION, SOLUTION INTRAVENOUS at 08:03

## 2019-03-17 RX ADMIN — CEFAZOLIN SODIUM 2 G: 2 INJECTION, SOLUTION INTRAVENOUS at 00:36

## 2019-03-17 RX ADMIN — ENOXAPARIN SODIUM 40 MG: 40 INJECTION SUBCUTANEOUS at 17:57

## 2019-03-17 RX ADMIN — OXYCODONE HYDROCHLORIDE 10 MG: 5 TABLET ORAL at 16:02

## 2019-03-17 RX ADMIN — OXYCODONE HYDROCHLORIDE 10 MG: 5 TABLET ORAL at 12:37

## 2019-03-17 RX ADMIN — OXYBUTYNIN CHLORIDE 5 MG: 5 TABLET ORAL at 21:43

## 2019-03-17 RX ADMIN — ACETAMINOPHEN 1000 MG: 500 TABLET, FILM COATED ORAL at 12:37

## 2019-03-17 RX ADMIN — ACETAMINOPHEN 1000 MG: 500 TABLET, FILM COATED ORAL at 21:43

## 2019-03-17 RX ADMIN — ACETAMINOPHEN 1000 MG: 500 TABLET, FILM COATED ORAL at 05:36

## 2019-03-17 RX ADMIN — BUPROPION HYDROCHLORIDE 150 MG: 150 TABLET, EXTENDED RELEASE ORAL at 21:43

## 2019-03-17 RX ADMIN — CEFAZOLIN SODIUM 2 G: 2 INJECTION, SOLUTION INTRAVENOUS at 16:16

## 2019-03-17 RX ADMIN — MULTIPLE VITAMINS W/ MINERALS TAB 1 TABLET: TAB at 10:42

## 2019-03-17 RX ADMIN — OMEPRAZOLE 40 MG: 20 CAPSULE, DELAYED RELEASE ORAL at 08:47

## 2019-03-17 RX ADMIN — OXYCODONE HYDROCHLORIDE 10 MG: 5 TABLET ORAL at 21:45

## 2019-03-17 RX ADMIN — CITALOPRAM HYDROBROMIDE 40 MG: 40 TABLET ORAL at 10:46

## 2019-03-17 RX ADMIN — VITAMIN D, TAB 1000IU (100/BT) 2000 UNITS: 25 TAB at 10:47

## 2019-03-17 RX ADMIN — OXYCODONE HYDROCHLORIDE 10 MG: 5 TABLET ORAL at 05:36

## 2019-03-17 RX ADMIN — BUPROPION HYDROCHLORIDE 150 MG: 150 TABLET, EXTENDED RELEASE ORAL at 10:48

## 2019-03-17 RX ADMIN — OXYCODONE HYDROCHLORIDE 10 MG: 5 TABLET ORAL at 08:47

## 2019-03-17 RX ADMIN — AMLODIPINE BESYLATE 5 MG: 5 TABLET ORAL at 10:44

## 2019-03-17 RX ADMIN — Medication 1 TABLET: at 17:57

## 2019-03-17 ASSESSMENT — ACTIVITIES OF DAILY LIVING (ADL)
ADLS_ACUITY_SCORE: 22
ADLS_ACUITY_SCORE: 18
ADLS_ACUITY_SCORE: 18
ADLS_ACUITY_SCORE: 22
ADLS_ACUITY_SCORE: 19
ADLS_ACUITY_SCORE: 22

## 2019-03-17 NOTE — PROGRESS NOTES
03/17/19 0900   Quick Adds   Type of Visit Initial PT Evaluation   Living Environment   Lives With alone   Living Arrangements house  ([facility])   Home Accessibility stairs within home   Number of Stairs, Within Home, Primary (1 flight of steps)   Stair Railings, Within Home, Primary railing on right side (ascending)   Living Environment Comment Stairs down to laundry    Self-Care   Usual Activity Tolerance good   Current Activity Tolerance fair   Equipment Currently Used at Home tub bench;walker, rolling   Activity/Exercise/Self-Care Comment Patient previously independent at home with no AD   Functional Level Prior   Ambulation 0-->independent   Transferring 0-->independent   Toileting 0-->independent   Bathing 0-->independent   Communication 0-->understands/communicates without difficulty   Swallowing 0-->swallows foods/liquids without difficulty   Cognition 0 - no cognition issues reported   Fall history within last six months yes   Number of times patient has fallen within last six months 1   Which of the above functional risks had a recent onset or change? ambulation;transferring   General Information   Onset of Illness/Injury or Date of Surgery - Date 03/11/19   Referring Physician Luis Enrique Elizabeth MD   Patient/Family Goals Statement Would like to get better   Pertinent History of Current Problem (include personal factors and/or comorbidities that impact the POC) Patient admitted on 3/11/19 for evaluation of intractable low back pain and suspected septic left MONA. Per I&D note, cultures from the left hip synovial fluid is negative so far; suspect possible early discitis. Patient with PMH of  hypertension, depression, anxiety, dyslipidemia, GERD.    Precautions/Limitations fall precautions   Weight-Bearing Status - LLE full weight-bearing   Weight-Bearing Status - RLE full weight-bearing   General Observations Patient sitting in chair upon arrival of therapist, agreeable to working with PT    Cognitive  "Status Examination   Orientation orientation to person, place and time   Level of Consciousness alert   Follows Commands and Answers Questions 100% of the time;able to follow multistep instructions   Pain Assessment   Patient Currently in Pain Yes, see Vital Sign flowsheet   Integumentary/Edema   Integumentary/Edema no deficits were identifed   Posture    Posture Forward head position;Kyphosis;Protracted shoulders   Range of Motion (ROM)   ROM Comment B LE ROM WNL    Strength   Strength Comments Not formally assessed but at least 3+/5 with functional transfers and gait    Bed Mobility   Bed Mobility Comments Sit>supine with Min A    Transfer Skills   Transfer Comments Sit>stand with SBA and FWW   Gait   Gait Comments Patient ambulated 10 feet with use of FWW and SBA, slow gait with varied step length 2/2 to increase in pain with ambulation    Balance   Balance Comments Slightly unsteady with ambulation 2/2 to pain, but no increased physical A needed to support    General Therapy Interventions   Planned Therapy Interventions bed mobility training;gait training;strengthening;transfer training;home program guidelines   Clinical Impression   Criteria for Skilled Therapeutic Intervention yes, treatment indicated   PT Diagnosis Impaired mobility and gait    Influenced by the following impairments pain, weakness   Functional limitations due to impairments bed mobility, transfers, gait, stairs   Clinical Presentation Evolving/Changing   Clinical Presentation Rationale Based on PMH, current presentation, and social support    Clinical Decision Making (Complexity) Low complexity   Therapy Frequency` daily   Predicted Duration of Therapy Intervention (days/wks) 4 days   Anticipated Discharge Disposition Transitional Care Facility   Risk & Benefits of therapy have been explained Yes   Patient, Family & other staff in agreement with plan of care Yes   [facility] AM-PAC TM \"6 Clicks\"   2016, Trustees of [facility], " "under license to [employer].  All rights reserved.   6 Clicks Short Forms Basic Mobility Inpatient Short Form   [facility] AM-PAC  \"6 Clicks\" V.2 Basic Mobility Inpatient Short Form   1. Turning from your back to your side while in a flat bed without using bedrails? 4 - None   2. Moving from lying on your back to sitting on the side of a flat bed without using bedrails? 3 - A Little   3. Moving to and from a bed to a chair (including a wheelchair)? 3 - A Little   4. Standing up from a chair using your arms (e.g., wheelchair, or bedside chair)? 3 - A Little   5. To walk in hospital room? 3 - A Little   6. Climbing 3-5 steps with a railing? 2 - A Lot   Basic Mobility Raw Score (Score out of 24.Lower scores equate to lower levels of function) 18   Total Evaluation Time   Total Evaluation Time (Minutes) 10     "

## 2019-03-17 NOTE — PROGRESS NOTES
[facility]  Hospitalist Progress Note  Luis Enrique Elizabeth MD    Assessment & Plan   Parvin Wen is a very pleasant 88 year old female with PMHx notable for hypertension, depression, anxiety, dyslipidemia, and GERD who was admitted on 3/11/2019 with intractable low back pain.     MSSA bacteremia    Left lower back and hip pain  History of left-sided hip replacement approximately 1 year ago  -Patient presented with fever up to 102.8 and worsening left lower back and hip pain  -Initial concern for left septic total hip arthroplasty.  -CT scan of the hip negative for any obvious complication.  -Status post arthrocentesis of the left hip 03/13; cultures from the left hip synovial fluid negative so far  -Bacteremic with methicillin sensitive staph aureus; source of bacteremia unclear. Question early discitis.  -MRI of the lumbar spine negative for any discitis or osteomyelitis.  -Transthoracic echo 03/14 without obvious vegetation  -Started on IV cefazolin and 2 g every 8 hourly  -Repeat culture from 3/13 and 03/15 negative so far  -PICC line placed 03/17  -Infectious disease following. Plan for IV Ancef for 25 more days upon discharge.     Hypoxia:   -At admission there was concerns about healthcare associated pneumonia. She reportedly had mild nonproductive cough, apparently her O2 sats dropped after she received narcotics  -Chest x-ray from 3/11 revealed increased patchy opacity within the right lung which could represent aspiration/ infection or atelectasis  -Patient was started on IV Zosyn and zithromax upon admission  -Medically did not appear like pneumonia, with normal white cell of 8.6 and low procalcitonin of 0.06.  -No pre-existing lung disease;  hypoxia could be related to ongoing narcotic use and atelectasis as patient not very mobile because of the pain  -Recheck chest x-ray 03/15 showed clear lungs  -Echo 03/14 showed LVEF of 65-70% and mildly dilated ascending aorta  -Currently  only on IV Ancef as above for MSSA bacteremia  -Currently saturating 89% on RA. O2 available prn.    Physical deconditioning:   -Continue PT     Hypertension  BP controlled.  -Continue PTA Norvasc 5 mg daily  -PRN IV labetalol and hydralazine for systolic blood pressure greater than 180     Dyslipidemia  -Continue PTA atorvastatin 40 mg po daily     Depression, anxiety  -Continue PTA Wellbutrin and Celexa     GERD  -Continue PTA omperazole     Chronic urinary incontinence  -Continue PTA oxybutynin     Constipation  -Bowel regimen.    Chronic anemia.  Stable around baseline of 9-10.    Recent Labs   Lab 03/16/19  0950 03/13/19  0919 03/11/19 2010   HGB 9.3* 9.3* 10.1*         Orders Placed This Encounter      Regular Diet Adult       DVT Prophylaxis: Lovenox     Code Status: Full Code    Disposition: Expected discharge: in 1-2 days, likely to TCU.    D/W RN.  D/W grand daughter 03/16.    Interval History   Patient is doing well. Patient continues to have pain in her back with movement. O2 need dramatically dropped and now off O2 supplement.    Data reviewed today: I reviewed all new labs and imaging over the last 24 hours. I personally reviewed no images or EKG's today.    Physical Exam   Temp: 98.8  F (37.1  C) Temp src: Oral BP: 112/56 Pulse: 68   Resp: 20 SpO2: (!) 89 % O2 Device: None (Room air) Oxygen Delivery: 2 LPM  Vitals:    03/11/19 1937 03/12/19 0353   Weight: 55.8 kg (123 lb) 57.1 kg (125 lb 14.1 oz)     Vital Signs with Ranges  Temp:  [98.2  F (36.8  C)-98.8  F (37.1  C)] 98.8  F (37.1  C)  Pulse:  [65-82] 68  Resp:  [16-20] 20  BP: (111-122)/(51-64) 112/56  SpO2:  [89 %-95 %] 89 %  I/O's Last 24 hours  No intake/output data recorded.    Constitutional: Comfortable, no acute distress  HEENT: + conjunctival pallor.  Neurologic: Awake, alert, fully oriented  Neck: Supple, no elevated JVP  Respiratory: Clear to auscultation B/L  Cardiovascular: Normal S1 and S2, regular rhythm and rate  GI: Abdomen soft,  non tender, non distended  Extremities: No calf tenderness, no significant LE edema  Skin/integument: No acute rash, no cyanosis    Medications   All medications were reviewed.      acetaminophen  1,000 mg Oral Q8H     amLODIPine  5 mg Oral QAM     aspirin  81 mg Oral QAM     atorvastatin  40 mg Oral Daily with supper     buPROPion  150 mg Oral BID     calcium carbonate 600 mg-vitamin D 400 units  1 tablet Oral Daily with supper     ceFAZolin  2 g Intravenous Q8H     citalopram  40 mg Oral QAM     [START ON 3/18/2019] enoxaparin  40 mg Subcutaneous Q24H     multivitamin w/minerals  1 tablet Oral Daily     omeprazole  40 mg Oral QAM     oxybutynin  5 mg Oral BID     polyethylene glycol  17 g Oral BID     sennosides  2 tablet Oral BID     sodium chloride (PF)  10 mL Intracatheter Q7 Days     vitamin D3  2,000 Units Oral QAM        Data   Recent Labs   Lab 03/17/19  0832 03/16/19  0950 03/15/19  1000  03/13/19  0919 03/11/19 2010   WBC  --  5.9  --   --  6.4 8.6   HGB  --  9.3*  --   --  9.3* 10.1*   MCV  --  95  --   --  93 92   PLT  --  288 261  --  162 204   NA  --   --   --   --  136 135   POTASSIUM  --   --   --   --  3.6 3.7   CHLORIDE  --   --   --   --  106 104   CO2  --   --   --   --  23 24   BUN  --   --   --   --  18 18   CR 0.75 0.84 0.79   < > 0.91 0.87   ANIONGAP  --   --   --   --  7 7   MISSY  --   --   --   --  8.0* 8.1*   GLC  --   --   --   --  105* 108*   ALBUMIN  --   --   --   --   --  3.1*   PROTTOTAL  --   --   --   --   --  7.3   BILITOTAL  --   --   --   --   --  0.5   ALKPHOS  --   --   --   --   --  88   ALT  --   --   --   --   --  24   AST  --   --   --   --   --  23   LIPASE  --   --   --   --   --  177    < > = values in this interval not displayed.       No results found for this or any previous visit (from the past 24 hour(s)).

## 2019-03-17 NOTE — PLAN OF CARE
Discharge Planner PT   Patient plan for discharge: Would like to go home   Current status: Orders received, chart reviewed, PT evaluation completed and treatment initiated. Patient admitted on 3/11/19 for evaluation of intractable low back pain and suspected septic left MONA. Per I&D note, cultures from the left hip synovial fluid is negative so far; suspect possible early discitis. [place] to the laundry in the basement with single hand rail. Patient states she is independent at baseline with no AD.    Patient sitting in chair upon arrival of therapist, agreeable to working with PT. Educated on role of PT and PT POC. Patient somewhat frustrated with not knowing source of infection and pain, however, motivated to work with therapy as she states she wants to get back to baseline. Sit<>stand from chair with FWW and SBA, needing time to make transition from sit>stand 2/2 to pain. Patient ambulated 50 feet with use of FWW and SBA, varied step length noted 2/2 to pain. Patient needing a few short standing rest breaks during second half of gait due to pain, noted to at times hold her breath. Patient notes she has a tendency to, especially with pain, but able to self correct. Sit>supine with Min A to assist with moving B LE up into bed, needing extended time to position in bed as patient noting muscle spasm like pain and at times catching her breath, donned O2 with SpO2 noted as 97%. Patient positioned on L side with pillows behind for comfort, all needs in reach, and bed alarm on upon therapist exit.   Barriers to return to prior living situation: pain, decreased tolerance to activity, current level of A  Recommendations for discharge: TCU   Rationale for recommendations: Patient lives alone and previously independent at baseline, current requiring A x 1 for all mobility and ambulation; patient most limited by pain. Patient would benefit from continued skilled therapy to further improve  strength, balance, and independence with mobility and ambulation to address functional limitations and decrease falls risk. However, pending length of hospital stay and pain management, patient may progress to level of independence for safe discharge home, will continue to assess during therapy sessions.        Entered by: Angelina Brown 03/17/2019 9:58 AM

## 2019-03-17 NOTE — PLAN OF CARE
7P-7A: Alert and oriented but forgetful. VSS on 2 L NC. Up with 1 assist, GB and walker to commode. Ambulated in halls twice.Takes oxycodone for back pain, scheduled tylenol also given.  PICC placement today.

## 2019-03-17 NOTE — PLAN OF CARE
Pt alert and oriented. Up with 1/walker and belt,ambulating in the he without sob. Room air sats 89-91%. PICC place KG. Pain continues, increases with certain movements. Poor appetite,supplements ordered. Incontinent of stool, laxatives and stool softeners held. Pt tearful at times,feels she is not improving, much encouragement given.

## 2019-03-18 ENCOUNTER — APPOINTMENT (OUTPATIENT)
Dept: PHYSICAL THERAPY | Facility: CLINIC | Age: 84
DRG: 560 | End: 2019-03-18
Payer: MEDICARE

## 2019-03-18 VITALS
BODY MASS INDEX: 27.16 KG/M2 | OXYGEN SATURATION: 95 % | DIASTOLIC BLOOD PRESSURE: 57 MMHG | HEIGHT: 57 IN | HEART RATE: 72 BPM | WEIGHT: 125.88 LBS | RESPIRATION RATE: 18 BRPM | TEMPERATURE: 98.5 F | SYSTOLIC BLOOD PRESSURE: 120 MMHG

## 2019-03-18 LAB
BACTERIA SPEC CULT: NO GROWTH
CREAT SERPL-MCNC: 0.73 MG/DL (ref 0.52–1.04)
GFR SERPL CREATININE-BSD FRML MDRD: 73 ML/MIN/{1.73_M2}
Lab: NORMAL
PLATELET # BLD AUTO: 298 10E9/L (ref 150–450)
SPECIMEN SOURCE: NORMAL

## 2019-03-18 PROCEDURE — 25000132 ZZH RX MED GY IP 250 OP 250 PS 637: Mod: GY | Performed by: INTERNAL MEDICINE

## 2019-03-18 PROCEDURE — 97530 THERAPEUTIC ACTIVITIES: CPT | Mod: GP

## 2019-03-18 PROCEDURE — 25000128 H RX IP 250 OP 636: Performed by: INTERNAL MEDICINE

## 2019-03-18 PROCEDURE — 97116 GAIT TRAINING THERAPY: CPT | Mod: GP

## 2019-03-18 PROCEDURE — 82565 ASSAY OF CREATININE: CPT | Performed by: INTERNAL MEDICINE

## 2019-03-18 PROCEDURE — 97110 THERAPEUTIC EXERCISES: CPT | Mod: GP

## 2019-03-18 PROCEDURE — 40000239 ZZH STATISTIC VAT ROUNDS

## 2019-03-18 PROCEDURE — A9270 NON-COVERED ITEM OR SERVICE: HCPCS | Mod: GY | Performed by: INTERNAL MEDICINE

## 2019-03-18 PROCEDURE — 85049 AUTOMATED PLATELET COUNT: CPT | Performed by: INTERNAL MEDICINE

## 2019-03-18 PROCEDURE — 99239 HOSP IP/OBS DSCHRG MGMT >30: CPT | Performed by: INTERNAL MEDICINE

## 2019-03-18 RX ORDER — OXYCODONE HYDROCHLORIDE 5 MG/1
5 TABLET ORAL
Qty: 20 TABLET | Refills: 0 | Status: SHIPPED | OUTPATIENT
Start: 2019-03-18

## 2019-03-18 RX ADMIN — VITAMIN D, TAB 1000IU (100/BT) 2000 UNITS: 25 TAB at 08:53

## 2019-03-18 RX ADMIN — ACETAMINOPHEN 1000 MG: 500 TABLET, FILM COATED ORAL at 05:32

## 2019-03-18 RX ADMIN — MULTIPLE VITAMINS W/ MINERALS TAB 1 TABLET: TAB at 08:53

## 2019-03-18 RX ADMIN — SENNOSIDES 2 TABLET: 8.6 TABLET, FILM COATED ORAL at 08:53

## 2019-03-18 RX ADMIN — CEFAZOLIN SODIUM 2 G: 2 INJECTION, SOLUTION INTRAVENOUS at 00:28

## 2019-03-18 RX ADMIN — OMEPRAZOLE 40 MG: 20 CAPSULE, DELAYED RELEASE ORAL at 08:52

## 2019-03-18 RX ADMIN — OXYBUTYNIN CHLORIDE 5 MG: 5 TABLET ORAL at 08:52

## 2019-03-18 RX ADMIN — BUPROPION HYDROCHLORIDE 150 MG: 150 TABLET, EXTENDED RELEASE ORAL at 08:53

## 2019-03-18 RX ADMIN — ASPIRIN 81 MG: 81 TABLET, COATED ORAL at 08:53

## 2019-03-18 RX ADMIN — CITALOPRAM HYDROBROMIDE 40 MG: 40 TABLET ORAL at 08:52

## 2019-03-18 RX ADMIN — CEFAZOLIN SODIUM 2 G: 2 INJECTION, SOLUTION INTRAVENOUS at 15:28

## 2019-03-18 RX ADMIN — AMLODIPINE BESYLATE 5 MG: 5 TABLET ORAL at 08:52

## 2019-03-18 RX ADMIN — OXYCODONE HYDROCHLORIDE 10 MG: 5 TABLET ORAL at 02:34

## 2019-03-18 RX ADMIN — OXYCODONE HYDROCHLORIDE 10 MG: 5 TABLET ORAL at 14:34

## 2019-03-18 RX ADMIN — OXYCODONE HYDROCHLORIDE 10 MG: 5 TABLET ORAL at 05:33

## 2019-03-18 RX ADMIN — ACETAMINOPHEN 1000 MG: 500 TABLET, FILM COATED ORAL at 13:35

## 2019-03-18 RX ADMIN — CEFAZOLIN SODIUM 2 G: 2 INJECTION, SOLUTION INTRAVENOUS at 09:01

## 2019-03-18 ASSESSMENT — ACTIVITIES OF DAILY LIVING (ADL)
ADLS_ACUITY_SCORE: 18

## 2019-03-18 NOTE — PROGRESS NOTES
[facility]  Infectious Disease Progress Note          Assessment and Plan:   IMPRESSION:   1.  An 88-year-old female with 1 week of worsening low back and left hip area pain, severe, and unrelenting, MRI scan of the back is negative.  CT scan pending from the hip, in light of #2 below.  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   2.  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 #3 below.   3.  Respiratory symptoms, doubt second infection.  If infection in the lungs, likely Staphylococcus aureus also.        RECOMMENDATIONS:   1.  Continue Ancef   2.  Serial blood cultures until clear. 3/12 +. 3/13  Neg, line OK  3.   hip aspirate benign , not infected looking, cx neg? Pain early discitis, if so ABX alone, imaging wo other obvious staph complication  4 With BC cleared, fever  resolved, simply tx extended IV course and watch pain  5, orders in for ancef , continue to work on pain and follow over time            Interval History:   no new complaints and a bit better pain control; no cp, sob, n/v/d, or abd pain. T down, cognition nl hip aspirate cx pending but fluid not infected looking Bc clear hip neg              Medications:       acetaminophen  1,000 mg Oral Q8H     amLODIPine  5 mg Oral QAM     aspirin  81 mg Oral QAM     atorvastatin  40 mg Oral Daily with supper     buPROPion  150 mg Oral BID     calcium carbonate 600 mg-vitamin D 400 units  1 tablet Oral Daily with supper     ceFAZolin  2 g Intravenous Q8H     citalopram  40 mg Oral QAM     enoxaparin  40 mg Subcutaneous Q24H     multivitamin w/minerals  1 tablet Oral Daily     omeprazole  40 mg Oral QAM     oxybutynin  5 mg Oral BID     polyethylene glycol  17 g Oral BID     sennosides  2 tablet Oral BID     sodium chloride (PF)  10 mL Intracatheter Q7 Days     vitamin D3  2,000  "Units Oral QAM                  Physical Exam:   Blood pressure (!) 133/97, pulse 70, temperature 98.4  F (36.9  C), temperature source Oral, resp. rate 17, height 1.448 m (4' 9.01\"), weight 57.1 kg (125 lb 14.1 oz), SpO2 90 %.  Wt Readings from Last 2 Encounters:   03/12/19 57.1 kg (125 lb 14.1 oz)   12/11/17 54.4 kg (120 lb)     Vital Signs with Ranges  Temp:  [98.4  F (36.9  C)-99.3  F (37.4  C)] 98.4  F (36.9  C)  Pulse:  [70-76] 70  Heart Rate:  [70] 70  Resp:  [17-20] 17  BP: (121-133)/(56-97) 133/97  SpO2:  [86 %-92 %] 90 %    Constitutional: Awake, alert, cooperative, no apparent distress   Lungs: Clear to auscultation bilaterally, no crackles or wheezing   Cardiovascular: Regular rate and rhythm, normal S1 and S2, and no murmur noted   Abdomen: Normal bowel sounds, soft, non-distended, non-tender   Skin: No rashes, no cyanosis, no edema pain with any movement ? Back but hip movement also an issue   Other:           Data:   All microbiology laboratory data reviewed.  Recent Labs   Lab Test 03/18/19  0755 03/16/19  0950 03/15/19  1000 03/13/19  0919 03/11/19 2010   WBC  --  5.9  --  6.4 8.6   HGB  --  9.3*  --  9.3* 10.1*   HCT  --  29.5*  --  28.3* 30.2*   MCV  --  95  --  93 92    288 261 162 204     Recent Labs   Lab Test 03/18/19  0755 03/17/19  0832 03/16/19  0950   CR 0.73 0.75 0.84     Recent Labs   Lab Test 03/13/19  0919   SED 69*     Recent Labs   Lab Test 03/15/19  0959 03/14/19  0918 03/13/19  1530 03/13/19  0918 03/12/19  1540 03/12/19  1535 03/11/19  2200 03/11/19 2045 03/11/19 2010   CULT No growth after 3 days No growth after 4 days No growth No growth after 5 days Cultured on the 1st day of incubation:  Staphylococcus aureus  Susceptibility testing done on previous specimen  *  Critical Value/Significant Value, preliminary result only, called to and read back by  MING ALBRIGHT, RN 1550 3.13.19 NDP   Cultured on the 2nd day of incubation:  Staphylococcus aureus  Susceptibility " testing done on previous specimen  *  Critical Value/Significant Value, preliminary result only, called to and read back by  Chiqui Duran, RN 0037 3/14/19. MS   Cultured on the 1st day of incubation:  Staphylococcus aureus  Susceptibility testing done on previous specimen  *  Critical Value/Significant Value, preliminary result only, called to and read back by  Sonya Waite RN SH66 @ 1318 3.12.19 JE   No growth Cultured on the 1st day of incubation:  Staphylococcus aureus  *  Critical Value/Significant Value, preliminary result only, called to and read back by  Sonya Waite RN from [facility] MS 6 on 3.12.19 at 1128 by .    (Note)  POSITIVE for STAPHYLOCOCCUS AUREUS and NEGATIVE for the mecA gene  (not MRSA) by [employer] multiplex nucleic acid test. The mecA gene was  not detected. Final identification and antimicrobial susceptibility  testing will be verified by standard methods.    Specimen tested with Verigene multiplex, gram-positive blood culture  nucleic acid test for the following targets: Staph aureus, Staph  epidermidis, Staph lugdunensis, other Staph species, Enterococcus  faecalis, Enterococcus faecium, Streptococcus species, S. agalactiae,  S. anginosus grp., S. pneumoniae, S. pyogenes, Listeria sp., mecA  (methicillin resistance) and Noble/B (vancomycin resistance).    Critical Value/Significant Value called to and read back by ricardo waite rn @7379 3/12/19. [facility]

## 2019-03-18 NOTE — PROGRESS NOTES
3/18/2019    Parvin Wen    Cultures and cell count NOT consistent with infected total hip replacement. Furthermore, her pain pattern is much more consistent with a lumbar etiology, even without infection. Her lumbar MRI shows multiple chronic compression fractures and degenerative disc disease, without infection. I do not see a sacral insufficiency fracture.

## 2019-03-18 NOTE — PLAN OF CARE
Discharge    Patient discharged to TCU via WC ride with   Care plan note: pt received ancef IV via PICC at 1530 before discharge. Left at 1600.     Listed belongings gathered and returned to patient. Yes  Care Plan and Patient education resolved: Yes  Prescriptions if needed, hard copies sent with patient  Yes  Home and hospital acquired medications returned to patient: NA  Medication Bin checked and emptied on discharge Yes  Follow up appointment made for patient: Yes

## 2019-03-18 NOTE — PROGRESS NOTES
PAS-RR    Per [facility] regulation, SW completed and submitted PAS-RR to [place] [employer] via the Senior Link[employer] Line.  PAS-RR confirmation # is : [id number]  Further questions may be directed to [facility] at #[phone], option #4 for PAS-RR staff.    Addendum  Patient has been accepted at [facility] in [place] for today.  Faxed orders, prescription and the PAS.  Spoke with patient about transport and she requests a wheelchair ride. Reviewed the private pay charge for this, which patient agrees to. [facility] wheelchair ride set at 1600.  Updated patient's daughter, Marianela via voice mail message.    Discharge to [facility] at 1600 today.

## 2019-03-18 NOTE — PROGRESS NOTES
[facility]  Hospitalist Progress Note  Luis Enrique Elizabeth MD    Assessment & Plan   Please see discharge summary.     Orders Placed This Encounter      Regular Diet Adult       DVT Prophylaxis: Lovenox     Code Status: Full Code    Disposition: TCU today.    D/W RN.  D/W son 03/18.    Interval History   Patient is doing well. Patient continues to have pain in her back with movement. Remains off O2.    Data reviewed today: I reviewed all new labs and imaging over the last 24 hours. I personally reviewed no images or EKG's today.    Physical Exam   Temp: 98.4  F (36.9  C) Temp src: Oral BP: (!) 133/97 Pulse: 70 Heart Rate: 70 Resp: 17 SpO2: 90 % O2 Device: None (Room air)    Vitals:    03/11/19 1937 03/12/19 0353   Weight: 55.8 kg (123 lb) 57.1 kg (125 lb 14.1 oz)     Vital Signs with Ranges  Temp:  [98.4  F (36.9  C)-99.3  F (37.4  C)] 98.4  F (36.9  C)  Pulse:  [70-76] 70  Heart Rate:  [70] 70  Resp:  [17-20] 17  BP: (121-133)/(56-97) 133/97  SpO2:  [86 %-92 %] 90 %  I/O's Last 24 hours  No intake/output data recorded.    Constitutional: Comfortable, no acute distress  HEENT: + conjunctival pallor.  Neurologic: Awake, alert, fully oriented  Neck: Supple, no elevated JVP  Respiratory: Clear to auscultation B/L  Cardiovascular: Normal S1 and S2, regular rhythm and rate  GI: Abdomen soft, non tender, non distended  Extremities: No calf tenderness, no significant LE edema  Skin/integument: No acute rash, no cyanosis    Medications   All medications were reviewed.      acetaminophen  1,000 mg Oral Q8H     amLODIPine  5 mg Oral QAM     aspirin  81 mg Oral QAM     atorvastatin  40 mg Oral Daily with supper     buPROPion  150 mg Oral BID     calcium carbonate 600 mg-vitamin D 400 units  1 tablet Oral Daily with supper     ceFAZolin  2 g Intravenous Q8H     citalopram  40 mg Oral QAM     enoxaparin  40 mg Subcutaneous Q24H     multivitamin w/minerals  1 tablet Oral Daily     omeprazole  40 mg Oral QAM      oxybutynin  5 mg Oral BID     polyethylene glycol  17 g Oral BID     sennosides  2 tablet Oral BID     sodium chloride (PF)  10 mL Intracatheter Q7 Days     vitamin D3  2,000 Units Oral QAM        Data   Recent Labs   Lab 03/18/19  0755 03/17/19  0832 03/16/19  0950 03/15/19  1000  03/13/19  0919 03/11/19 2010   WBC  --   --  5.9  --   --  6.4 8.6   HGB  --   --  9.3*  --   --  9.3* 10.1*   MCV  --   --  95  --   --  93 92     --  288 261  --  162 204   NA  --   --   --   --   --  136 135   POTASSIUM  --   --   --   --   --  3.6 3.7   CHLORIDE  --   --   --   --   --  106 104   CO2  --   --   --   --   --  23 24   BUN  --   --   --   --   --  18 18   CR 0.73 0.75 0.84 0.79   < > 0.91 0.87   ANIONGAP  --   --   --   --   --  7 7   MISSY  --   --   --   --   --  8.0* 8.1*   GLC  --   --   --   --   --  105* 108*   ALBUMIN  --   --   --   --   --   --  3.1*   PROTTOTAL  --   --   --   --   --   --  7.3   BILITOTAL  --   --   --   --   --   --  0.5   ALKPHOS  --   --   --   --   --   --  88   ALT  --   --   --   --   --   --  24   AST  --   --   --   --   --   --  23   LIPASE  --   --   --   --   --   --  177    < > = values in this interval not displayed.       No results found for this or any previous visit (from the past 24 hour(s)).

## 2019-03-18 NOTE — PLAN OF CARE
A/O x4, tearful and frustrated, active listening utilized, emotional support provided. AVSS on RA. C/o back spasms, oxycodone given x3. LS clear, diminished, dyspnea upon exertion. PICC on RUE WDL, blood return noted. Assist x1, GB/walker. Held senna, miralax/glycolax, loose stool yesterday. Possible discharge today. Will continue to monitor.

## 2019-03-18 NOTE — PLAN OF CARE
"Pt is A/Ox4. Up with Assist of one, MARIELOS, walker. VSS on RA, c/o back pain this afternoon after getting dressed. Oxycodone and Tylenol given to manage. LS diminished. Denies chest pain. Ambulated with PT. Dyspnea on exertion. Voiding. +BS, one BM today. Denies nausea. Poor oral intake, \"Food does not taste good\". RU arm PICC in place, patent. Pt will continue on IV Antibiotic. Plan for discharge today at 4 pm to TCU.   "

## 2019-03-18 NOTE — DISCHARGE SUMMARY
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 She was admitted for further management.     For a detailed history and physical exam, please refer to the dictated admission note by Dr. Faheem Cherry on 03/12/2019.     MSSA bacteremia, source unclear, but suspicious for infected left total hip arthroplasty  Left lower back and hip pain  History of left-sided hip replacement approximately 1 year ago  -Patient presented with fever up to 102.8 and worsening left lower back and hip pain  -Bacteremic with methicillin sensitive staph aureus; source of bacteremia unclear, but high probability of infected left total hip arthroplasty  -CBC on admission with normal white cell of 8.6. Lactic acid normal at 0.7. CRP elevated at 142. Procalcitonin 0.06.  -CT scan of the hip negative for any obvious complication.  -Status post arthrocentesis of the left hip 03/13; fluid analysis with 582 wbc's, 59% neutrophils, and no organisms on gram stain; culture  from the left hip synovial fluid did not grow any organism.  -MRI of the lumbar spine negative for any discitis or osteomyelitis.  -Transthoracic echo 03/14 without obvious vegetation  -Started on IV cefazolin and 2 g every 8 hourly  -Repeat culture from 3/13 and 03/15 negative so far  -PICC line placed 03/17  -Infectious disease assisted with management. Plan for IV Ancef for 25 more days after discharge.  -Pain was managed with prn analgesics and narcotics.     Hypoxia:   -At admission there was concerns about healthcare associated pneumonia. She reportedly had mild nonproductive cough, and her O2 sats dropped seemingly after she received narcotics  -Chest x-ray from 3/11 revealed increased patchy opacity within the right lung which could represent aspiration/ infection or atelectasis  -Patient was started on IV Zosyn and zithromax upon admission  -Medically did not appear like pneumonia, with normal white cell of 8.6 and low procalcitonin of 0.06.  -Patient had no pre-existing lung disease; hypoxia felt to be related to ongoing narcotic use and atelectasis as patient not very mobile because of the pain  -Recheck chest x-ray 03/15 showed clear lungs  -Echo 03/14 showed LVEF of 65-70% and mildly dilated ascending aorta  -Hypoxia resolved by the time of discharge     Physical deconditioning:   Patient received physical therapy. Recommended TCU.     Hypertension  Chronic and BP remained controlled with PTA Norvasc 5 mg daily.     Dyslipidemia  Continued PTA atorvastatin 40 mg po daily.     Depression, anxiety  Continued PTA Wellbutrin and Celexa.     GERD  Continued PTA omperazole.     Chronic urinary incontinence  Continued PTA oxybutynin.     Constipation  On bowel regimen.     Chronic anemia.  Baseline Hgb in the range of 9-10. It remained stable.     Recent Labs   Lab 03/16/19  0950 03/13/19  0919 03/11/19 2010   HGB 9.3* 9.3* 10.1*               Code Status:      Full Code         Important Results:      Cr  0.73, Hgb 9.3, WBC 5.9         Pending Results:        Unresulted Labs Ordered in the Past 30 Days of this Admission     Date and Time Order Name Status Description    3/15/2019 0000 Blood culture Preliminary     3/14/2019 0000 Blood culture Preliminary     3/13/2019 0000 Blood culture Preliminary                Discharge Instructions and Follow-Up:      Follow-up Appointments     Follow Up and recommended labs and tests      Follow up with PCP in 2 weeks.  Follow up with Dr. Murphy of [facility] in 4 weeks.                  Discharge Medications:        Current Discharge Medication List      START taking these medications    Details   ceFAZolin (ANCEF) 1 GM vial Inject 2 g into the vein every 8 hours for 25 days ESR,CRP,CBC with differential, creatinine, SGOT weekly while on this medication to be faxed to Dr. Carson office.  Qty: 1 each, Refills: 1    Associated Diagnoses: Gram-positive bacteremia         CONTINUE these medications which have CHANGED    Details   oxyCODONE (ROXICODONE) 5 MG tablet Take 1 tablet (5 mg) by mouth every 3 hours as needed for breakthrough pain or severe pain  Qty: 20 tablet, Refills: 0    Associated Diagnoses: Status post left hip replacement         CONTINUE these medications which have NOT CHANGED    Details   ondansetron (ZOFRAN-ODT) 4 MG ODT tab Take 1 tablet (4 mg) by mouth every 6 hours as needed for nausea or vomiting  Qty: 120 tablet, Refills: 0    Associated Diagnoses: Status post left hip replacement      Acetaminophen (TYLENOL PO) Take 500-1,000 mg by mouth every 8 hours as needed for mild pain or fever      ALENdronate (FOSAMAX) 70 MG tablet Take 70 mg by mouth every 7 days (Sunday)      AMLODIPINE BESYLATE PO Take 5 mg by mouth every morning       ASPIRIN EC PO Take 81 mg by mouth every morning      ATORVASTATIN CALCIUM PO Take 40 mg by mouth daily (with dinner)       BuPROPion HCl (WELLBUTRIN SR PO) Take 150 mg by mouth 2 times daily       calcium-vitamin D (CALTRATE)  "600-400 MG-UNIT per tablet Take 1 tablet by mouth daily (with dinner)       citalopram (CELEXA) 40 MG tablet Take 40 mg by mouth every morning       Multiple Vitamin (MULTI-VITAMIN PO) Take 1 tablet by mouth daily (with dinner)       omeprazole (PRILOSEC) 40 MG capsule Take 40 mg by mouth every morning       oxybutynin (DITROPAN) 5 MG tablet Take 5 mg by mouth 2 times daily.      senna-docusate (SENOKOT-S;PERICOLACE) 8.6-50 MG per tablet Take 1-2 tablets by mouth 2 times daily  Qty: 100 tablet, Refills: 0    Associated Diagnoses: Status post left hip replacement      VITAMIN D, CHOLECALCIFEROL, PO Take 2,000 Units by mouth every morning                  Allergies:         Allergies   Allergen Reactions     Propofol      \"I got stiff as a board\"            Consultations This Hospital Stay:      ID  Orthopedics         Condition and Physical Exam on Discharge:      Discharge condition: Stable   Discharge vitals: Blood pressure (!) 133/97, pulse 70, temperature 98.4  F (36.9  C), temperature source Oral, resp. rate 17, height 1.448 m (4' 9.01\"), weight 57.1 kg (125 lb 14.1 oz), SpO2 90 %.           Discharge Orders for Skilled Facility (from Discharge Orders):        After Care Instructions     Activity - Up with nursing assistance      Advance Diet as Tolerated      Follow this diet upon discharge: Orders Placed This Encounter      Room Service      Snacks/Supplements Adult: Boost Plus; Between Meals      Regular Diet Adult         Fall precautions      General info for SNF      Length of Stay Estimate: Short Term Care: Estimated # of Days <30  Condition at Discharge: Stable  Level of care:skilled   Rehabilitation Potential: Good  Admission H&P remains valid and up-to-date: Yes  Recent Chemotherapy: N/A  Use Nursing Home Standing Orders: Yes         Mantoux instructions      Give two-step Mantoux (PPD) Per Facility Policy Yes                    Rehab orders for Skilled Facility (from Discharge Orders):      Referrals  "    Future Labs/Procedures    Occupational Therapy Adult Consult     Comments:    Evaluate and treat as clinically indicated.    Reason:  Physical deconditioning    Physical Therapy Adult Consult     Comments:    Evaluate and treat as clinically indicated.    Reason:  Physical deconditioning             Discharge Time:      Greater than 30 minutes.        Image Results From This Hospital Stay (For Non-EPIC Providers):        Results for orders placed or performed during the hospital encounter of 03/11/19   CT Abdomen Pelvis w Contrast    Narrative    CT ABDOMEN AND PELVIS WITH CONTRAST   3/11/2019 10:19 PM     HISTORY: Abdominal pain, fever, rule out diverticulitis.    TECHNIQUE: 62 mL Isovue-370. CT images of the abdomen and pelvis  following nonionic intravenous contrast. Radiation dose for this scan  was reduced using automated exposure control, adjustment of the mA  and/or kV according to patient size, or iterative reconstruction  technique.    COMPARISON: None.    FINDINGS:   The liver, spleen, and pancreas are unremarkable. The gallbladder is  distended. The adrenal glands appear normal. There is no  hydronephrosis. No evidence of solid renal mass. The aorta is  atherosclerotic but not aneurysmal. There is severe coronary  atherosclerosis.    No free air or ascites. No evidence of appendicitis or diverticulitis.  No bowel obstruction. There is moderate volume of retained colonic  stool in the ascending colon. No enlarged abdominal or pelvic lymph  nodes. There are bilateral hip replacements. There are multiple lumbar  compression deformities, some of which have undergone vertebroplasty.      Impression    IMPRESSION:  1. Moderate volume of retained colonic stool in the right colon  suggests constipation.  2. Gallbladder distention without other CT evidence of cholecystitis.  3. No cause for fever demonstrated.    MARIO NORMAN MD   XR Chest 1 View    Narrative    XR CHEST ONE VIEW   3/11/2019 9:55 PM      HISTORY: Fever.    COMPARISON: 11/20/2016      Impression    IMPRESSION: There is increased patchy opacity within the inferomedial  right lung along the right heart border which could represent  aspiration, infection, or atelectasis. Background of perihilar and  basilar opacities are favored to represent a combination of mild  vascular enlargement and atelectasis/chronic scarring, similar  compared to the prior exam. No evidence of effusion. Multiple old  right-sided rib fractures are present. Heart size is unchanged. Mid  thoracic compression fractures and changes of vertebroplasty are  noted.    JAMES ENGLAND MD   Lumbar spine MRI w & w/o contrast - surgery <10yrs    Narrative    MRI OF THE LUMBAR SPINE WITHOUT AND WITH CONTRAST  3/12/2019 12:20 AM     HISTORY:  Back pain, fever. Rule out disc, infection.    TECHNIQUE: Multiplanar, multisequence MRI images of the lumbar spine  were acquired without and with 5 mL Gadavist IV contrast.    COMPARISON: Lumbar spine CT scan 5/30/2012.    FINDINGS: There are five lumbar-type vertebrae for the purposes of  this dictation.     Compression deformities of all five lumbar vertebral bodies again  noted without definite change. Vertebroplasty changes in the L3 and L4  vertebral bodies again noted. There is no bone marrow edema in any of  the lumbar vertebral bodies to suggest recent or progressing fracture  abnormality. Alignment of the lumbar vertebrae remains normal. There  is no abnormal contrast enhancement in any of the lumbar vertebrae.    There is posterior disc bulging/herniation to varying degrees at all  levels of the lumbar spine. There is no abnormal contrast enhancement  in any of the lumbar intervertebral discs.    The tip of the conus medullaris is at the mid L2 level which is within  normal limits. There is no evidence for intrathecal abnormality. There  is no abnormal intrathecal contrast enhancement.    Level by level:     L1-L2: There is a  circumferential disc bulge and mild facet  arthropathy bilaterally. There is mild bilateral neural foraminal  narrowing and minimal spinal canal narrowing.    L2-L3: There is a circumferential disc bulge, thickening of the  ligamentum flavum bilaterally and mild facet arthropathy bilaterally.  These findings result in mild-moderate spinal canal narrowing,  mild-moderate right foraminal narrowing and mild left foraminal  narrowing.    L3-L4: There is a circumferential disc bulge, mild thickening of the  ligamentum flavum bilaterally and mild facet arthropathy bilaterally.  These findings result in mild-moderate bilateral neural foraminal  narrowing and mild spinal narrowing.    L4-L5: There is a circumferential disc bulge, circumferential endplate  spurring, thickening of the ligamentum flavum bilaterally and moderate  facet arthropathy bilaterally. These findings result in moderate  spinal canal stenosis, mild-moderate left foraminal narrowing and mild  right foraminal narrowing.    L5-S1: There is a circumferential disc bulge, moderate facet  arthropathy bilaterally and mild thickening of the ligamentum flavum  bilaterally. These findings result in moderate bilateral neural  foraminal stenosis but no significant spinal canal narrowing.      Impression    IMPRESSION:  1. Chronic compression deformities of all five lumbar vertebral bodies  again noted without definite change. There is no bone marrow edema in  any of the lumbar vertebrae to suggest recent or progressing  compression fractures.  2. Diffuse degenerative changes of the lumbar spine as described  above.  3. No findings to suggest discitis.    CHERRY DUMAS MD   CT Hip Left w/o & w Contrast    Narrative    CT HIP LEFT WITH CONTRAST 3/12/2019 4:19 PM     HISTORY: Hip pain and MSSA bacteremia.    CONTRAST DOSE:  62 mL Isovue-370    Radiation dose for this scan was reduced using automated exposure  control, adjustment of the mA and/or kV according to patient  size, or  iterative reconstruction technique.    FINDINGS:  Bilateral total hip arthroplasties are present with  associated metallic streak/beam hardening artifact associated with the  implants. This limits evaluation of soft tissues, particularly  immediately adjacent to the metal implants. With this limitation, no  soft tissue fluid collection or abnormal rim enhancement is visible.  Although there may be mild left femoral diaphyseal anterolateral  cortical thinning adjacent to the tip of the femoral stem, there is no  apparent periprosthetic osseous destruction or lucency indicative of  prosthetic loosening. There is mild deformity of the left inferior and  superior pubic rami compatible with old healed fractures. Degenerative  changes and chondrocalcinosis are noted at the pubis symphysis.  Advanced degenerative changes are noted in the lower lumbar spine with  at least mild central stenosis at the presumed L4-5 level, not  optimally evaluated with this scan. Extensive atherosclerotic  calcification is noted in the femoral arteries.      Impression    IMPRESSION:  1. Bilateral total hip arthroplasty without CT evidence of  complication. Although metallic artifact limits evaluation of the  immediately adjacent soft tissues, no soft tissue fluid collection or  abnormal rim enhancement are demonstrated.  2. Left superior/inferior pubic rami healed fractures.  3. Pubis symphysis chondrocalcinosis and degenerative changes.  4. Extensive atherosclerotic calcification.  5. Lower lumbar spine degenerative changes with at least mild central  stenosis at the L4-5 level, not optimally evaluated with this scan.    DARRON ALFREDO MD   XR Joint Aspiration Major Left    Narrative    EXAM: Fluoroscopic guided hip aspiration   3/13/19       History:  Left MONA suspect septic MONA..     Operators:  MD Sunitha Cisse    PROCEDURE: The risks (including bleeding, infection, and allergy to  contrast and medications)  and benefits of the procedure were explained  to the patient and consent was obtained.  Using sterile technique and  fluoroscopic guidance, a #20 gauge needle was placed into the left hip  joint using an anterior approach.  Proximally 0.5 cc serous fluid was  aspirated.  Estimated blood loss during the procedure was less than 5  mL. No initial complication. Fluid sent to lab for analysis.      Fluoroscopy time: .1  Images Obtained: 1  Medications used: 3mL Local Lidocaine 1%.       Impression    IMPRESSION:  Technically successful left hip aspiration.    PATRICK PERALES MD   XR Chest Port 1 View    Narrative    CHEST ONE VIEW PORTABLE  3/15/2019 1:18 PM     HISTORY:  Follow up hypoxia.    COMPARISON: Chest x-ray 3/11/2019.      Impression    IMPRESSION:  Portable view of the chest is performed. Lungs are clear  but hypoaerated. Probable trace pleural effusions not evident on prior  exam. No evidence of pneumothorax. Heart size remains enlarged. Mid  thoracic vertebroplasty repair of a compression fracture is noted.  Aorta demonstrates calcified plaque and is unchanged.    MAURISIO ZULETA MD     No results found for this or any previous visit (from the past 4320 hour(s)).        Most Recent Lab Results In EPIC (For Non-EPIC Providers):    Most Recent 3 CBC's:  Recent Labs   Lab Test 03/18/19  0755 03/16/19  0950 03/15/19  1000 03/13/19 0919 03/11/19 2010   WBC  --  5.9  --  6.4 8.6   HGB  --  9.3*  --  9.3* 10.1*   MCV  --  95  --  93 92    288 261 162 204      Most Recent 3 BMP's:  Recent Labs   Lab Test 03/18/19  0755 03/17/19  0832 03/16/19  0950  03/13/19  0919 03/11/19 2010 12/11/17 0918   NA  --   --   --   --  136 135  --  139   POTASSIUM  --   --   --   --  3.6 3.7  --  3.8   CHLORIDE  --   --   --   --  106 104  --  106   CO2  --   --   --   --  23 24  --  27   BUN  --   --   --   --  18 18  --  20   CR 0.73 0.75 0.84   < > 0.91 0.87   < > 0.93   ANIONGAP  --   --   --   --  7 7  --  6   MISSY  --   --    --   --  8.0* 8.1*  --  8.8   GLC  --   --   --   --  105* 108*  --  92    < > = values in this interval not displayed.     Most Recent 3 Troponin's:No lab results found.    Invalid input(s): TROP, TROPONINIES  Most Recent 3 INR's:  Recent Labs   Lab Test 11/20/16  1722 02/11/11  1150   INR 0.96 0.93     Most Recent 2 LFT's:  Recent Labs   Lab Test 03/11/19 2010   AST 23   ALT 24   ALKPHOS 88   BILITOTAL 0.5     Most Recent Cholesterol Panel:No lab results found.  Most Recent 6 Bacteria Isolates From Any Culture (See EPIC Reports for Culture Details):  Recent Labs   Lab Test 03/15/19  0959 03/14/19  0918 03/13/19  1530 03/13/19  0918 03/12/19  1540 03/12/19  1535   CULT No growth after 3 days No growth after 4 days No growth No growth after 5 days Cultured on the 1st day of incubation:  Staphylococcus aureus  Susceptibility testing done on previous specimen  *  Critical Value/Significant Value, preliminary result only, called to and read back by  MING ALBRIGHT, RN 1550 3.13.19 NDP   Cultured on the 2nd day of incubation:  Staphylococcus aureus  Susceptibility testing done on previous specimen  *  Critical Value/Significant Value, preliminary result only, called to and read back by  Chiqui Duran, RN 0037 3/14/19. MS       Most Recent TSH, T4 and HgbA1c:No lab results found.

## 2019-03-18 NOTE — PLAN OF CARE
Discharge Planner PT   Patient plan for discharge: Not stated  Current status: Patient sitting in chair upon arrival of therapist, agreeable to working with PT. Sit<>stand with FWW and supervision, time needed to complete, patient guarding on L side with transfer. Patient ambulated 100 feet with use of FWW and SBA, varied step length con't with a few short standing rest breaks 2/2 to pain and SOB. Patient noting SOB due to holding her breath and able to self correct with rest breaks. Patient complete gentle B LE exercises in chair with cues for technique, increased pain with hip flexion on L LE, noting muscle spasms. Educated on proper movement patterns and body mechanics for comfort. Patient in chair at end of session with all needs in reach and chair alarm on.   Barriers to return to prior living situation: pain, current level of assist, decreased tolerance to activity, lives alone   Recommendations for discharge: TCU   Rationale for recommendations: Patient would benefit from continued skilled therapy at TCU to further improve strength, balance, and independence with mobility and ambulation to address functional limitations and decrease falls risk.         Entered by: Angelina Brown 03/18/2019 9:28 AM      Physical Therapy Discharge Summary    Reason for therapy discharge:    Discharged to transitional care facility.    Progress towards therapy goal(s). See goals on Care Plan in [facility] electronic health record for goal details.  Goals not met.  Barriers to achieving goals:   discharge from facility.    Therapy recommendation(s):    Continued therapy is recommended.  Rationale/Recommendations: Patient would benefit from continued skilled therapy at TCU to further improve strength, balance, and independence with mobility and ambulation to address functional limitations and decrease falls risk.

## 2019-03-19 LAB
BACTERIA SPEC CULT: NO GROWTH
Lab: NORMAL
SPECIMEN SOURCE: NORMAL

## 2019-03-20 LAB
BACTERIA SPEC CULT: NO GROWTH
Lab: NORMAL
SPECIMEN SOURCE: NORMAL

## 2019-03-21 LAB
BACTERIA SPEC CULT: NO GROWTH
Lab: NORMAL
SPECIMEN SOURCE: NORMAL

## 2021-05-10 NOTE — PLAN OF CARE
Problem: Patient Care Overview  Goal: Plan of Care/Patient Progress Review  OT: Order received, chart reviewed. Per chart review and discussion with pt, pt is discharging to TCU. Will defer skilled OT eval to next level of care. Pt educated on OT role and deferring of OT to TCU, verbalized understanding. IP OT order completed.        serum magnesium level 9.0

## (undated) DEVICE — DRAIN ROUND W/RESERV KIT JACKSON PRATT 10FR 400ML SU130-402D

## (undated) DEVICE — MANIFOLD NEPTUNE 4 PORT 700-20

## (undated) DEVICE — SU VICRYL 2-0 CP-1 27" UND J266H

## (undated) DEVICE — SU ETHIBOND 0 CT-1 CR 8X18" CX21D

## (undated) DEVICE — PACK TOTAL HIP W/U DRAPE SOP15HUFSC

## (undated) DEVICE — GLOVE PROTEXIS BLUE W/NEU-THERA 7.0  2D73EB70

## (undated) DEVICE — GLOVE PROTEXIS POWDER FREE 8.5 ORTHOPEDIC 2D73ET85

## (undated) DEVICE — SOLUTION WOUND CLEANSING 3/4OZ 10% PVP EA-L3011FB-50

## (undated) DEVICE — BLADE SAW SAGITTAL STRK 25X90X1.37MM 4H SYS 6 6125-137-090

## (undated) DEVICE — SOL WATER IRRIG 1000ML BOTTLE 07139-09

## (undated) DEVICE — GLOVE PROTEXIS W/NEU-THERA 8.5  2D73TE85

## (undated) DEVICE — SPONGE LAP 18X18" X8435

## (undated) DEVICE — LINEN TOWEL PACK X5 5464

## (undated) DEVICE — SOL NACL 0.9% IRRIG 1000ML BOTTLE 07138-09

## (undated) DEVICE — CATH TRAY FOLEY SURESTEP 16FR WDRAIN BAG STLK LATEX A300316A

## (undated) DEVICE — SU VICRYL 0 CP-1 27" J467H

## (undated) DEVICE — PREP CHLORAPREP 26ML TINTED ORANGE  260815

## (undated) DEVICE — IMM PILLOW ABDUCT HIP MED 31143061

## (undated) DEVICE — SPONGE PACK VAGINAL 2X36"

## (undated) DEVICE — GLOVE PROTEXIS W/NEU-THERA 7.0  2D73TE70

## (undated) DEVICE — WIPES FOLEY CARE SURESTEP PROVON DFC100

## (undated) DEVICE — ESU GROUND PAD UNIVERSAL W/O CORD

## (undated) DEVICE — DRSG XEROFORM 5X9" 8884431605

## (undated) RX ORDER — ACETAMINOPHEN 500 MG
TABLET ORAL
Status: DISPENSED
Start: 2017-12-11

## (undated) RX ORDER — FENTANYL CITRATE 50 UG/ML
INJECTION, SOLUTION INTRAMUSCULAR; INTRAVENOUS
Status: DISPENSED
Start: 2017-12-11

## (undated) RX ORDER — HYDROMORPHONE HYDROCHLORIDE 1 MG/ML
INJECTION, SOLUTION INTRAMUSCULAR; INTRAVENOUS; SUBCUTANEOUS
Status: DISPENSED
Start: 2017-12-11

## (undated) RX ORDER — DEXAMETHASONE SODIUM PHOSPHATE 4 MG/ML
INJECTION, SOLUTION INTRA-ARTICULAR; INTRALESIONAL; INTRAMUSCULAR; INTRAVENOUS; SOFT TISSUE
Status: DISPENSED
Start: 2017-12-11

## (undated) RX ORDER — CEFAZOLIN SODIUM 1 G/3ML
INJECTION, POWDER, FOR SOLUTION INTRAMUSCULAR; INTRAVENOUS
Status: DISPENSED
Start: 2017-12-11

## (undated) RX ORDER — LIDOCAINE HYDROCHLORIDE 10 MG/ML
INJECTION, SOLUTION EPIDURAL; INFILTRATION; INTRACAUDAL; PERINEURAL
Status: DISPENSED
Start: 2019-03-13

## (undated) RX ORDER — CEFAZOLIN SODIUM 2 G/100ML
INJECTION, SOLUTION INTRAVENOUS
Status: DISPENSED
Start: 2017-12-11

## (undated) RX ORDER — ONDANSETRON 2 MG/ML
INJECTION INTRAMUSCULAR; INTRAVENOUS
Status: DISPENSED
Start: 2017-12-11

## (undated) RX ORDER — ALBUMIN, HUMAN INJ 5% 5 %
SOLUTION INTRAVENOUS
Status: DISPENSED
Start: 2017-12-11

## (undated) RX ORDER — LIDOCAINE HYDROCHLORIDE 20 MG/ML
INJECTION, SOLUTION EPIDURAL; INFILTRATION; INTRACAUDAL; PERINEURAL
Status: DISPENSED
Start: 2017-12-11

## (undated) RX ORDER — ETOMIDATE 2 MG/ML
INJECTION INTRAVENOUS
Status: DISPENSED
Start: 2017-12-11